# Patient Record
Sex: MALE | Race: WHITE | Employment: OTHER | URBAN - METROPOLITAN AREA
[De-identification: names, ages, dates, MRNs, and addresses within clinical notes are randomized per-mention and may not be internally consistent; named-entity substitution may affect disease eponyms.]

---

## 2017-01-05 ENCOUNTER — HOSPITAL ENCOUNTER (OUTPATIENT)
Dept: NON INVASIVE DIAGNOSTICS | Age: 57
Discharge: HOME | End: 2017-01-05
Payer: MEDICARE

## 2017-01-05 DIAGNOSIS — R06.09 DOE (DYSPNEA ON EXERTION): ICD-10-CM

## 2017-01-05 LAB
LV EF: 60 %
LVEF MODALITY: NORMAL

## 2017-01-05 PROCEDURE — 93017 CV STRESS TEST TRACING ONLY: CPT

## 2017-01-05 PROCEDURE — 93350 STRESS TTE ONLY: CPT

## 2017-01-05 PROCEDURE — 93306 TTE W/DOPPLER COMPLETE: CPT

## 2017-01-05 PROCEDURE — 2580000003 HC RX 258

## 2017-01-05 RX ORDER — DOBUTAMINE HYDROCHLORIDE 100 MG/100ML
2.5 INJECTION INTRAVENOUS CONTINUOUS
Status: DISPENSED | OUTPATIENT
Start: 2017-01-05 | End: 2017-01-05

## 2017-01-06 ENCOUNTER — OFFICE VISIT (OUTPATIENT)
Dept: CARDIOLOGY | Age: 57
End: 2017-01-06

## 2017-01-06 VITALS
HEART RATE: 80 BPM | WEIGHT: 313 LBS | OXYGEN SATURATION: 96 % | DIASTOLIC BLOOD PRESSURE: 86 MMHG | HEIGHT: 72 IN | BODY MASS INDEX: 42.39 KG/M2 | SYSTOLIC BLOOD PRESSURE: 128 MMHG | RESPIRATION RATE: 12 BRPM

## 2017-01-06 DIAGNOSIS — I71.21 ASCENDING AORTIC ANEURYSM: ICD-10-CM

## 2017-01-06 DIAGNOSIS — R06.02 SOB (SHORTNESS OF BREATH): ICD-10-CM

## 2017-01-06 DIAGNOSIS — E78.2 MIXED HYPERLIPIDEMIA: Primary | ICD-10-CM

## 2017-01-06 DIAGNOSIS — E66.01 MORBID OBESITY DUE TO EXCESS CALORIES (HCC): ICD-10-CM

## 2017-01-06 DIAGNOSIS — I45.10 RBBB (RIGHT BUNDLE BRANCH BLOCK): ICD-10-CM

## 2017-01-06 PROBLEM — E66.09 OBESITY DUE TO EXCESS CALORIES: Status: ACTIVE | Noted: 2017-01-06

## 2017-01-06 PROCEDURE — 99204 OFFICE O/P NEW MOD 45 MIN: CPT | Performed by: INTERNAL MEDICINE

## 2017-01-23 ENCOUNTER — HOSPITAL ENCOUNTER (OUTPATIENT)
Dept: CT IMAGING | Age: 57
Discharge: HOME | End: 2017-01-23
Payer: MEDICARE

## 2017-01-23 VITALS — HEART RATE: 67 BPM | RESPIRATION RATE: 18 BRPM | SYSTOLIC BLOOD PRESSURE: 122 MMHG | DIASTOLIC BLOOD PRESSURE: 84 MMHG

## 2017-01-23 DIAGNOSIS — I71.21 ASCENDING AORTIC ANEURYSM: ICD-10-CM

## 2017-01-23 PROCEDURE — 6360000004 HC RX CONTRAST MEDICATION: Performed by: RADIOLOGY

## 2017-01-23 PROCEDURE — 71270 CT THORAX DX C-/C+: CPT

## 2017-01-23 RX ADMIN — IOPAMIDOL 100 ML: 612 INJECTION, SOLUTION INTRAVENOUS at 10:14

## 2017-01-31 ENCOUNTER — OFFICE VISIT (OUTPATIENT)
Dept: CARDIOLOGY | Age: 57
End: 2017-01-31

## 2017-01-31 VITALS
WEIGHT: 297 LBS | SYSTOLIC BLOOD PRESSURE: 130 MMHG | HEART RATE: 70 BPM | BODY MASS INDEX: 40.23 KG/M2 | OXYGEN SATURATION: 98 % | DIASTOLIC BLOOD PRESSURE: 80 MMHG | HEIGHT: 72 IN

## 2017-01-31 DIAGNOSIS — E66.01 MORBID OBESITY DUE TO EXCESS CALORIES (HCC): ICD-10-CM

## 2017-01-31 DIAGNOSIS — I45.10 RBBB (RIGHT BUNDLE BRANCH BLOCK): ICD-10-CM

## 2017-01-31 DIAGNOSIS — I71.21 ASCENDING AORTIC ANEURYSM: ICD-10-CM

## 2017-01-31 DIAGNOSIS — E78.2 MIXED HYPERLIPIDEMIA: Primary | ICD-10-CM

## 2017-01-31 DIAGNOSIS — I35.0 AORTIC STENOSIS, MODERATE: ICD-10-CM

## 2017-01-31 PROCEDURE — 99213 OFFICE O/P EST LOW 20 MIN: CPT | Performed by: INTERNAL MEDICINE

## 2017-01-31 PROCEDURE — G8484 FLU IMMUNIZE NO ADMIN: HCPCS | Performed by: INTERNAL MEDICINE

## 2017-01-31 PROCEDURE — 1036F TOBACCO NON-USER: CPT | Performed by: INTERNAL MEDICINE

## 2017-01-31 PROCEDURE — 3017F COLORECTAL CA SCREEN DOC REV: CPT | Performed by: INTERNAL MEDICINE

## 2017-01-31 PROCEDURE — G8428 CUR MEDS NOT DOCUMENT: HCPCS | Performed by: INTERNAL MEDICINE

## 2017-01-31 PROCEDURE — G8419 CALC BMI OUT NRM PARAM NOF/U: HCPCS | Performed by: INTERNAL MEDICINE

## 2017-02-06 ENCOUNTER — TELEPHONE (OUTPATIENT)
Dept: CARDIOLOGY | Age: 57
End: 2017-02-06

## 2017-02-07 ENCOUNTER — TELEPHONE (OUTPATIENT)
Dept: CARDIOLOGY | Age: 57
End: 2017-02-07

## 2017-02-10 DIAGNOSIS — R06.02 SOB (SHORTNESS OF BREATH): ICD-10-CM

## 2017-02-10 DIAGNOSIS — I45.10 RBBB (RIGHT BUNDLE BRANCH BLOCK): Primary | ICD-10-CM

## 2017-02-10 DIAGNOSIS — I71.21 ASCENDING AORTIC ANEURYSM: ICD-10-CM

## 2017-02-16 ENCOUNTER — HOSPITAL ENCOUNTER (OUTPATIENT)
Dept: CARDIAC CATH/INVASIVE PROCEDURES | Age: 57
Setting detail: OUTPATIENT SURGERY
Discharge: HOME | End: 2017-02-16
Attending: INTERNAL MEDICINE | Admitting: INTERNAL MEDICINE
Payer: MEDICARE

## 2017-02-16 VITALS
RESPIRATION RATE: 14 BRPM | OXYGEN SATURATION: 93 % | TEMPERATURE: 96.8 F | HEART RATE: 67 BPM | HEIGHT: 72 IN | WEIGHT: 297.62 LBS | DIASTOLIC BLOOD PRESSURE: 75 MMHG | SYSTOLIC BLOOD PRESSURE: 113 MMHG | BODY MASS INDEX: 40.31 KG/M2

## 2017-02-16 LAB
ANION GAP SERPL CALCULATED.3IONS-SCNC: 13 MEQ/L (ref 7–13)
BUN BLDV-MCNC: 15 MG/DL (ref 6–20)
CALCIUM SERPL-MCNC: 9.2 MG/DL (ref 8.6–10.2)
CHLORIDE BLD-SCNC: 99 MEQ/L (ref 98–107)
CO2: 25 MEQ/L (ref 22–29)
CREAT SERPL-MCNC: 0.79 MG/DL (ref 0.7–1.2)
GFR AFRICAN AMERICAN: >60
GFR NON-AFRICAN AMERICAN: >60
GLUCOSE BLD-MCNC: 95 MG/DL (ref 74–109)
HCT VFR BLD CALC: 47.9 % (ref 42–52)
HEMOGLOBIN: 16.3 G/DL (ref 14–18)
INR BLD: 1
MCH RBC QN AUTO: 32.1 PG (ref 27–31.3)
MCHC RBC AUTO-ENTMCNC: 34 % (ref 33–37)
MCV RBC AUTO: 94.2 FL (ref 80–100)
PDW BLD-RTO: 12.9 % (ref 11.5–14.5)
PLATELET # BLD: 244 K/UL (ref 130–400)
POTASSIUM SERPL-SCNC: 3.9 MEQ/L (ref 3.5–5.1)
PROTHROMBIN TIME: 11.1 SEC (ref 8.1–13.7)
RBC # BLD: 5.09 M/UL (ref 4.7–6.1)
SODIUM BLD-SCNC: 137 MEQ/L (ref 132–144)
WBC # BLD: 6.2 K/UL (ref 4.8–10.8)

## 2017-02-16 PROCEDURE — 2500000003 HC RX 250 WO HCPCS

## 2017-02-16 PROCEDURE — 80048 BASIC METABOLIC PNL TOTAL CA: CPT

## 2017-02-16 PROCEDURE — 85027 COMPLETE CBC AUTOMATED: CPT

## 2017-02-16 PROCEDURE — 2580000003 HC RX 258: Performed by: INTERNAL MEDICINE

## 2017-02-16 PROCEDURE — 93005 ELECTROCARDIOGRAM TRACING: CPT

## 2017-02-16 PROCEDURE — 6360000002 HC RX W HCPCS

## 2017-02-16 PROCEDURE — 2720000010 HC SURG SUPPLY STERILE: Performed by: NURSE PRACTITIONER

## 2017-02-16 PROCEDURE — 2580000003 HC RX 258

## 2017-02-16 PROCEDURE — 93458 L HRT ARTERY/VENTRICLE ANGIO: CPT | Performed by: INTERNAL MEDICINE

## 2017-02-16 PROCEDURE — C1894 INTRO/SHEATH, NON-LASER: HCPCS | Performed by: NURSE PRACTITIONER

## 2017-02-16 PROCEDURE — C1769 GUIDE WIRE: HCPCS | Performed by: NURSE PRACTITIONER

## 2017-02-16 PROCEDURE — 93454 CORONARY ARTERY ANGIO S&I: CPT | Performed by: INTERNAL MEDICINE

## 2017-02-16 PROCEDURE — C1725 CATH, TRANSLUMIN NON-LASER: HCPCS | Performed by: NURSE PRACTITIONER

## 2017-02-16 PROCEDURE — 85610 PROTHROMBIN TIME: CPT

## 2017-02-16 RX ORDER — SODIUM CHLORIDE 9 MG/ML
INJECTION, SOLUTION INTRAVENOUS CONTINUOUS
Status: ACTIVE | OUTPATIENT
Start: 2017-02-16 | End: 2017-02-16

## 2017-02-16 RX ORDER — ASPIRIN 81 MG/1
81 TABLET, CHEWABLE ORAL ONCE
Status: DISCONTINUED | OUTPATIENT
Start: 2017-02-16 | End: 2017-02-17 | Stop reason: HOSPADM

## 2017-02-16 RX ORDER — ONDANSETRON 2 MG/ML
4 INJECTION INTRAMUSCULAR; INTRAVENOUS EVERY 6 HOURS PRN
Status: DISCONTINUED | OUTPATIENT
Start: 2017-02-16 | End: 2017-02-17 | Stop reason: HOSPADM

## 2017-02-16 RX ORDER — SODIUM CHLORIDE 9 MG/ML
INJECTION, SOLUTION INTRAVENOUS CONTINUOUS
Status: DISCONTINUED | OUTPATIENT
Start: 2017-02-16 | End: 2017-02-17 | Stop reason: HOSPADM

## 2017-02-16 RX ORDER — SODIUM CHLORIDE 0.9 % (FLUSH) 0.9 %
10 SYRINGE (ML) INJECTION EVERY 12 HOURS SCHEDULED
Status: DISCONTINUED | OUTPATIENT
Start: 2017-02-16 | End: 2017-02-17 | Stop reason: HOSPADM

## 2017-02-16 RX ORDER — ACETAMINOPHEN 325 MG/1
650 TABLET ORAL EVERY 4 HOURS PRN
Status: DISCONTINUED | OUTPATIENT
Start: 2017-02-16 | End: 2017-02-17 | Stop reason: HOSPADM

## 2017-02-16 RX ORDER — RANOLAZINE 500 MG/1
500 TABLET, EXTENDED RELEASE ORAL ONCE
Status: DISCONTINUED | OUTPATIENT
Start: 2017-02-16 | End: 2017-02-17 | Stop reason: HOSPADM

## 2017-02-16 RX ORDER — ALPRAZOLAM 0.5 MG/1
0.5 TABLET ORAL
Status: ACTIVE | OUTPATIENT
Start: 2017-02-16 | End: 2017-02-16

## 2017-02-16 RX ADMIN — SODIUM CHLORIDE 1000 ML: 9 INJECTION, SOLUTION INTRAVENOUS at 11:52

## 2017-02-17 LAB
EKG ATRIAL RATE: 78 BPM
EKG P AXIS: 30 DEGREES
EKG P-R INTERVAL: 174 MS
EKG Q-T INTERVAL: 420 MS
EKG QRS DURATION: 126 MS
EKG QTC CALCULATION (BAZETT): 478 MS
EKG R AXIS: 74 DEGREES
EKG T AXIS: 17 DEGREES
EKG VENTRICULAR RATE: 78 BPM

## 2017-04-17 ENCOUNTER — HOSPITAL ENCOUNTER (OUTPATIENT)
Dept: PHARMACY | Age: 57
Discharge: HOME | End: 2017-04-17
Payer: MEDICARE

## 2017-04-17 LAB
INR BLD: 1.2
PROTIME: 14.8 SECONDS

## 2017-04-17 PROCEDURE — 85610 PROTHROMBIN TIME: CPT

## 2017-04-17 PROCEDURE — G0463 HOSPITAL OUTPT CLINIC VISIT: HCPCS

## 2017-04-17 RX ORDER — FUROSEMIDE 20 MG/1
20 TABLET ORAL DAILY
COMMUNITY
End: 2017-05-10

## 2017-04-17 RX ORDER — OXYCODONE HYDROCHLORIDE AND ACETAMINOPHEN 5; 325 MG/1; MG/1
1 TABLET ORAL EVERY 6 HOURS PRN
COMMUNITY
End: 2017-05-10

## 2017-04-17 RX ORDER — WARFARIN SODIUM 2 MG/1
2 TABLET ORAL CONTINUOUS
COMMUNITY
End: 2017-05-10

## 2017-04-17 RX ORDER — DOCUSATE SODIUM 100 MG/1
100 CAPSULE, LIQUID FILLED ORAL 2 TIMES DAILY
COMMUNITY
End: 2017-05-10

## 2017-04-17 RX ORDER — ASPIRIN 81 MG/1
81 TABLET, CHEWABLE ORAL DAILY
COMMUNITY

## 2017-04-17 RX ORDER — M-VIT,TX,IRON,MINS/CALC/FOLIC 27MG-0.4MG
1 TABLET ORAL DAILY
COMMUNITY
End: 2017-05-10

## 2017-04-25 ENCOUNTER — HOSPITAL ENCOUNTER (OUTPATIENT)
Dept: PHARMACY | Age: 57
Discharge: HOME | End: 2017-04-25
Payer: MEDICARE

## 2017-04-25 DIAGNOSIS — Z95.2 HEART VALVE REPLACED: ICD-10-CM

## 2017-04-25 LAB
INR BLD: 1.2
PROTIME: 14.1 SECONDS

## 2017-04-25 PROCEDURE — G0463 HOSPITAL OUTPT CLINIC VISIT: HCPCS | Performed by: PHARMACIST

## 2017-04-25 PROCEDURE — 85610 PROTHROMBIN TIME: CPT | Performed by: PHARMACIST

## 2017-05-03 ENCOUNTER — HOSPITAL ENCOUNTER (OUTPATIENT)
Dept: PHARMACY | Age: 57
Discharge: HOME | End: 2017-05-03
Payer: MEDICARE

## 2017-05-03 DIAGNOSIS — Z95.2 HEART VALVE REPLACED: ICD-10-CM

## 2017-05-03 LAB
INR BLD: 1.2
PROTIME: 14.1 SECONDS

## 2017-05-03 PROCEDURE — 85610 PROTHROMBIN TIME: CPT | Performed by: PHARMACIST

## 2017-05-03 PROCEDURE — 99211 OFF/OP EST MAY X REQ PHY/QHP: CPT | Performed by: PHARMACIST

## 2017-05-03 RX ORDER — WARFARIN SODIUM 5 MG/1
10 TABLET ORAL DAILY
COMMUNITY
End: 2017-05-03 | Stop reason: SDUPTHER

## 2017-05-03 RX ORDER — WARFARIN SODIUM 5 MG/1
TABLET ORAL
Qty: 200 TABLET | Refills: 1 | Status: SHIPPED | OUTPATIENT
Start: 2017-05-03 | End: 2017-05-03 | Stop reason: SDUPTHER

## 2017-05-03 RX ORDER — WARFARIN SODIUM 5 MG/1
TABLET ORAL
Qty: 200 TABLET | Refills: 1 | OUTPATIENT
Start: 2017-05-03 | End: 2017-06-23 | Stop reason: SDUPTHER

## 2017-05-10 ENCOUNTER — HOSPITAL ENCOUNTER (OUTPATIENT)
Dept: PHARMACY | Age: 57
Discharge: HOME | End: 2017-05-10
Payer: MEDICARE

## 2017-05-10 ENCOUNTER — TELEPHONE (OUTPATIENT)
Dept: CARDIOLOGY | Age: 57
End: 2017-05-10

## 2017-05-10 ENCOUNTER — OFFICE VISIT (OUTPATIENT)
Dept: PRIMARY CARE CLINIC | Age: 57
End: 2017-05-10

## 2017-05-10 VITALS
SYSTOLIC BLOOD PRESSURE: 126 MMHG | DIASTOLIC BLOOD PRESSURE: 82 MMHG | HEART RATE: 81 BPM | OXYGEN SATURATION: 95 % | BODY MASS INDEX: 38.43 KG/M2 | HEIGHT: 73 IN | TEMPERATURE: 97.1 F | WEIGHT: 290 LBS

## 2017-05-10 DIAGNOSIS — Z95.2 S/P AVR: ICD-10-CM

## 2017-05-10 DIAGNOSIS — Z12.5 PROSTATE CANCER SCREENING: ICD-10-CM

## 2017-05-10 DIAGNOSIS — E55.9 VITAMIN D DEFICIENCY: ICD-10-CM

## 2017-05-10 DIAGNOSIS — E78.2 MIXED HYPERLIPIDEMIA: ICD-10-CM

## 2017-05-10 DIAGNOSIS — I35.0 AORTIC STENOSIS, MODERATE: Primary | ICD-10-CM

## 2017-05-10 DIAGNOSIS — Z95.2 HEART VALVE REPLACED: ICD-10-CM

## 2017-05-10 LAB
INR BLD: 1.2
PROTIME: 14.8 SECONDS

## 2017-05-10 PROCEDURE — 99213 OFFICE O/P EST LOW 20 MIN: CPT | Performed by: FAMILY MEDICINE

## 2017-05-10 PROCEDURE — G8427 DOCREV CUR MEDS BY ELIG CLIN: HCPCS | Performed by: FAMILY MEDICINE

## 2017-05-10 PROCEDURE — 3017F COLORECTAL CA SCREEN DOC REV: CPT | Performed by: FAMILY MEDICINE

## 2017-05-10 PROCEDURE — 85610 PROTHROMBIN TIME: CPT

## 2017-05-10 PROCEDURE — 1036F TOBACCO NON-USER: CPT | Performed by: FAMILY MEDICINE

## 2017-05-10 PROCEDURE — G8417 CALC BMI ABV UP PARAM F/U: HCPCS | Performed by: FAMILY MEDICINE

## 2017-05-10 PROCEDURE — 99211 OFF/OP EST MAY X REQ PHY/QHP: CPT

## 2017-05-10 RX ORDER — ATORVASTATIN CALCIUM 40 MG/1
40 TABLET, FILM COATED ORAL DAILY
COMMUNITY
Start: 2017-05-03 | End: 2017-09-12

## 2017-05-10 ASSESSMENT — ENCOUNTER SYMPTOMS
SORE THROAT: 0
COUGH: 0
BACK PAIN: 0
ABDOMINAL PAIN: 0
WHEEZING: 0
DIARRHEA: 0
PHOTOPHOBIA: 0
SHORTNESS OF BREATH: 0
CONSTIPATION: 0
EYE REDNESS: 0
NAUSEA: 0
EYE PAIN: 0
VOMITING: 0

## 2017-05-11 ENCOUNTER — TELEPHONE (OUTPATIENT)
Dept: PHARMACY | Age: 57
End: 2017-05-11

## 2017-05-12 DIAGNOSIS — E78.2 MIXED HYPERLIPIDEMIA: ICD-10-CM

## 2017-05-12 DIAGNOSIS — Z12.5 PROSTATE CANCER SCREENING: ICD-10-CM

## 2017-05-12 DIAGNOSIS — E55.9 VITAMIN D DEFICIENCY: ICD-10-CM

## 2017-05-12 LAB
ALBUMIN SERPL-MCNC: 4.2 G/DL (ref 3.9–4.9)
ALP BLD-CCNC: 121 U/L (ref 35–104)
ALT SERPL-CCNC: 22 U/L (ref 0–41)
ANION GAP SERPL CALCULATED.3IONS-SCNC: 10 MEQ/L (ref 7–13)
AST SERPL-CCNC: 16 U/L (ref 0–40)
BILIRUB SERPL-MCNC: 0.2 MG/DL (ref 0–1.2)
BUN BLDV-MCNC: 14 MG/DL (ref 6–20)
CALCIUM SERPL-MCNC: 8.8 MG/DL (ref 8.6–10.2)
CHLORIDE BLD-SCNC: 104 MEQ/L (ref 98–107)
CHOLESTEROL, TOTAL: 153 MG/DL (ref 0–199)
CO2: 24 MEQ/L (ref 22–29)
CREAT SERPL-MCNC: 0.84 MG/DL (ref 0.7–1.2)
GFR AFRICAN AMERICAN: >60
GFR NON-AFRICAN AMERICAN: >60
GLOBULIN: 2.3 G/DL (ref 2.3–3.5)
GLUCOSE BLD-MCNC: 104 MG/DL (ref 74–109)
HDLC SERPL-MCNC: 40 MG/DL (ref 40–59)
LDL CHOLESTEROL CALCULATED: 87 MG/DL (ref 0–129)
POTASSIUM SERPL-SCNC: 4.2 MEQ/L (ref 3.5–5.1)
PROSTATE SPECIFIC ANTIGEN: 1.24 NG/ML (ref 0–3.89)
SODIUM BLD-SCNC: 138 MEQ/L (ref 132–144)
TOTAL PROTEIN: 6.5 G/DL (ref 6.4–8.1)
TRIGL SERPL-MCNC: 132 MG/DL (ref 0–200)
VITAMIN D 25-HYDROXY: 28 NG/ML (ref 30–100)

## 2017-05-19 ENCOUNTER — HOSPITAL ENCOUNTER (OUTPATIENT)
Dept: PHARMACY | Age: 57
Discharge: HOME | End: 2017-05-19
Payer: MEDICARE

## 2017-05-19 DIAGNOSIS — Z95.2 HEART VALVE REPLACED: ICD-10-CM

## 2017-05-19 LAB
INR BLD: 1.5
PROTIME: 17.5 SECONDS

## 2017-05-19 PROCEDURE — 85610 PROTHROMBIN TIME: CPT | Performed by: PHARMACIST

## 2017-05-19 PROCEDURE — 99211 OFF/OP EST MAY X REQ PHY/QHP: CPT | Performed by: PHARMACIST

## 2017-05-25 ENCOUNTER — TELEPHONE (OUTPATIENT)
Dept: PRIMARY CARE CLINIC | Age: 57
End: 2017-05-25

## 2017-06-02 ENCOUNTER — HOSPITAL ENCOUNTER (OUTPATIENT)
Dept: PHARMACY | Age: 57
Discharge: HOME | End: 2017-06-02
Payer: MEDICARE

## 2017-06-02 DIAGNOSIS — Z95.2 HEART VALVE REPLACED: ICD-10-CM

## 2017-06-02 LAB
INR BLD: 1.4
PROTIME: 17 SECONDS

## 2017-06-02 PROCEDURE — 85610 PROTHROMBIN TIME: CPT

## 2017-06-02 PROCEDURE — 99211 OFF/OP EST MAY X REQ PHY/QHP: CPT

## 2017-06-09 ENCOUNTER — HOSPITAL ENCOUNTER (OUTPATIENT)
Dept: PHARMACY | Age: 57
Discharge: HOME | End: 2017-06-09
Payer: MEDICARE

## 2017-06-09 DIAGNOSIS — Z95.2 HEART VALVE REPLACED: ICD-10-CM

## 2017-06-09 LAB
INR BLD: 2.1
PROTIME: 25 SECONDS

## 2017-06-09 PROCEDURE — 85610 PROTHROMBIN TIME: CPT

## 2017-06-09 PROCEDURE — 99211 OFF/OP EST MAY X REQ PHY/QHP: CPT

## 2017-06-10 RX ORDER — ERGOCALCIFEROL 1.25 MG/1
50000 CAPSULE ORAL WEEKLY
Qty: 12 CAPSULE | Refills: 0 | Status: SHIPPED | OUTPATIENT
Start: 2017-06-10 | End: 2017-09-12

## 2017-06-23 ENCOUNTER — HOSPITAL ENCOUNTER (OUTPATIENT)
Dept: PHARMACY | Age: 57
Setting detail: THERAPIES SERIES
Discharge: HOME | End: 2017-06-23
Payer: MEDICARE

## 2017-06-23 DIAGNOSIS — Z95.2 HEART VALVE REPLACED: ICD-10-CM

## 2017-06-23 LAB
INR BLD: 2.2
PROTIME: 26.2 SECONDS

## 2017-06-23 PROCEDURE — 99211 OFF/OP EST MAY X REQ PHY/QHP: CPT

## 2017-06-23 PROCEDURE — 85610 PROTHROMBIN TIME: CPT

## 2017-06-23 RX ORDER — WARFARIN SODIUM 5 MG/1
TABLET ORAL
Qty: 275 TABLET | Refills: 1 | Status: SHIPPED | OUTPATIENT
Start: 2017-06-23 | End: 2017-10-10

## 2017-07-14 ENCOUNTER — HOSPITAL ENCOUNTER (OUTPATIENT)
Dept: PHARMACY | Age: 57
Setting detail: THERAPIES SERIES
Discharge: HOME | End: 2017-07-14
Payer: MEDICARE

## 2017-07-14 DIAGNOSIS — Z95.2 HEART VALVE REPLACED: ICD-10-CM

## 2017-07-14 LAB
INR BLD: 2.1
PROTIME: 25.7 SECONDS

## 2017-07-14 PROCEDURE — 99211 OFF/OP EST MAY X REQ PHY/QHP: CPT

## 2017-07-14 PROCEDURE — 85610 PROTHROMBIN TIME: CPT

## 2017-08-14 ENCOUNTER — ANTI-COAG VISIT (OUTPATIENT)
Dept: PHARMACY | Age: 57
End: 2017-08-14

## 2017-08-14 DIAGNOSIS — Z95.2 HEART VALVE REPLACED: ICD-10-CM

## 2017-08-15 ENCOUNTER — OFFICE VISIT (OUTPATIENT)
Dept: PRIMARY CARE CLINIC | Age: 57
End: 2017-08-15

## 2017-08-15 VITALS
SYSTOLIC BLOOD PRESSURE: 102 MMHG | WEIGHT: 298 LBS | BODY MASS INDEX: 40.36 KG/M2 | TEMPERATURE: 98.1 F | HEIGHT: 72 IN | DIASTOLIC BLOOD PRESSURE: 70 MMHG | HEART RATE: 75 BPM | OXYGEN SATURATION: 95 % | RESPIRATION RATE: 16 BRPM

## 2017-08-15 DIAGNOSIS — F31.9 BIPOLAR 1 DISORDER (HCC): ICD-10-CM

## 2017-08-15 DIAGNOSIS — I35.0 AORTIC STENOSIS, MODERATE: ICD-10-CM

## 2017-08-15 DIAGNOSIS — E78.5 DYSLIPIDEMIA: Primary | ICD-10-CM

## 2017-08-15 DIAGNOSIS — E78.5 DYSLIPIDEMIA: ICD-10-CM

## 2017-08-15 LAB
ALBUMIN SERPL-MCNC: 4.2 G/DL (ref 3.9–4.9)
ALP BLD-CCNC: 144 U/L (ref 35–104)
ALT SERPL-CCNC: 22 U/L (ref 0–41)
ANION GAP SERPL CALCULATED.3IONS-SCNC: 16 MEQ/L (ref 7–13)
AST SERPL-CCNC: 21 U/L (ref 0–40)
BILIRUB SERPL-MCNC: 0.2 MG/DL (ref 0–1.2)
BUN BLDV-MCNC: 21 MG/DL (ref 6–20)
CALCIUM SERPL-MCNC: 8.8 MG/DL (ref 8.6–10.2)
CHLORIDE BLD-SCNC: 104 MEQ/L (ref 98–107)
CHOLESTEROL, TOTAL: 216 MG/DL (ref 0–199)
CO2: 23 MEQ/L (ref 22–29)
CREAT SERPL-MCNC: 0.83 MG/DL (ref 0.7–1.2)
GFR AFRICAN AMERICAN: >60
GFR NON-AFRICAN AMERICAN: >60
GLOBULIN: 2.7 G/DL (ref 2.3–3.5)
GLUCOSE BLD-MCNC: 94 MG/DL (ref 74–109)
HDLC SERPL-MCNC: 32 MG/DL (ref 40–59)
LDL CHOLESTEROL CALCULATED: ABNORMAL MG/DL (ref 0–129)
POTASSIUM SERPL-SCNC: 4.9 MEQ/L (ref 3.5–5.1)
SODIUM BLD-SCNC: 143 MEQ/L (ref 132–144)
TOTAL PROTEIN: 6.9 G/DL (ref 6.4–8.1)
TRIGL SERPL-MCNC: 436 MG/DL (ref 0–200)

## 2017-08-15 PROCEDURE — G8417 CALC BMI ABV UP PARAM F/U: HCPCS | Performed by: FAMILY MEDICINE

## 2017-08-15 PROCEDURE — 3017F COLORECTAL CA SCREEN DOC REV: CPT | Performed by: FAMILY MEDICINE

## 2017-08-15 PROCEDURE — G8427 DOCREV CUR MEDS BY ELIG CLIN: HCPCS | Performed by: FAMILY MEDICINE

## 2017-08-15 PROCEDURE — 1036F TOBACCO NON-USER: CPT | Performed by: FAMILY MEDICINE

## 2017-08-15 PROCEDURE — 99213 OFFICE O/P EST LOW 20 MIN: CPT | Performed by: FAMILY MEDICINE

## 2017-08-15 RX ORDER — CARBAMAZEPINE 300 MG/1
CAPSULE, EXTENDED RELEASE ORAL
Refills: 1 | COMMUNITY
Start: 2017-06-09 | End: 2017-08-15 | Stop reason: SDUPTHER

## 2017-08-15 ASSESSMENT — ENCOUNTER SYMPTOMS
BLOOD IN STOOL: 0
CONSTIPATION: 0
EYE DISCHARGE: 0
SHORTNESS OF BREATH: 0
VOMITING: 0
RESPIRATORY NEGATIVE: 1
EYES NEGATIVE: 1
GASTROINTESTINAL NEGATIVE: 1
DIARRHEA: 0
BACK PAIN: 0
CHEST TIGHTNESS: 0
PHOTOPHOBIA: 0
ABDOMINAL PAIN: 0
COUGH: 0
APNEA: 0
NAUSEA: 0

## 2017-08-15 ASSESSMENT — PATIENT HEALTH QUESTIONNAIRE - PHQ9
SUM OF ALL RESPONSES TO PHQ9 QUESTIONS 1 & 2: 0
2. FEELING DOWN, DEPRESSED OR HOPELESS: 0
1. LITTLE INTEREST OR PLEASURE IN DOING THINGS: 0
SUM OF ALL RESPONSES TO PHQ QUESTIONS 1-9: 0

## 2017-08-21 ENCOUNTER — ANTI-COAG VISIT (OUTPATIENT)
Dept: PHARMACY | Age: 57
End: 2017-08-21

## 2017-08-21 DIAGNOSIS — Z95.2 HEART VALVE REPLACED: ICD-10-CM

## 2017-08-29 ENCOUNTER — ANTI-COAG VISIT (OUTPATIENT)
Dept: PHARMACY | Age: 57
End: 2017-08-29

## 2017-08-29 DIAGNOSIS — Z95.2 HEART VALVE REPLACED: ICD-10-CM

## 2017-09-12 ENCOUNTER — OFFICE VISIT (OUTPATIENT)
Dept: PRIMARY CARE CLINIC | Age: 57
End: 2017-09-12

## 2017-09-12 ENCOUNTER — TELEPHONE (OUTPATIENT)
Dept: PHARMACY | Age: 57
End: 2017-09-12

## 2017-09-12 VITALS
HEIGHT: 72 IN | BODY MASS INDEX: 42.53 KG/M2 | WEIGHT: 314 LBS | HEART RATE: 62 BPM | DIASTOLIC BLOOD PRESSURE: 70 MMHG | SYSTOLIC BLOOD PRESSURE: 124 MMHG | RESPIRATION RATE: 18 BRPM | TEMPERATURE: 96.8 F

## 2017-09-12 DIAGNOSIS — I71.21 ASCENDING AORTIC ANEURYSM: ICD-10-CM

## 2017-09-12 DIAGNOSIS — E55.9 VITAMIN D DEFICIENCY: ICD-10-CM

## 2017-09-12 DIAGNOSIS — I35.0 AORTIC STENOSIS, MODERATE: ICD-10-CM

## 2017-09-12 DIAGNOSIS — F31.9 BIPOLAR 1 DISORDER (HCC): Primary | ICD-10-CM

## 2017-09-12 DIAGNOSIS — I71.21 ASCENDING AORTIC ANEURYSM (HCC): ICD-10-CM

## 2017-09-12 LAB
INR BLD: 1.2
PROTHROMBIN TIME: 13.1 SEC (ref 8.1–13.7)

## 2017-09-12 PROCEDURE — 99214 OFFICE O/P EST MOD 30 MIN: CPT | Performed by: FAMILY MEDICINE

## 2017-09-12 PROCEDURE — 3017F COLORECTAL CA SCREEN DOC REV: CPT | Performed by: FAMILY MEDICINE

## 2017-09-12 PROCEDURE — G8417 CALC BMI ABV UP PARAM F/U: HCPCS | Performed by: FAMILY MEDICINE

## 2017-09-12 PROCEDURE — 1036F TOBACCO NON-USER: CPT | Performed by: FAMILY MEDICINE

## 2017-09-12 PROCEDURE — G8427 DOCREV CUR MEDS BY ELIG CLIN: HCPCS | Performed by: FAMILY MEDICINE

## 2017-09-12 RX ORDER — MULTIVIT-MIN/IRON/FOLIC ACID/K 18-600-40
2000 CAPSULE ORAL DAILY
COMMUNITY
End: 2018-05-08

## 2017-09-12 ASSESSMENT — ENCOUNTER SYMPTOMS
DIARRHEA: 0
NAUSEA: 0
CONSTIPATION: 0
SWOLLEN GLANDS: 0
SHORTNESS OF BREATH: 0
WHEEZING: 0
ABDOMINAL PAIN: 0
VOMITING: 0
BACK PAIN: 0
SORE THROAT: 0
COUGH: 0

## 2017-09-13 ENCOUNTER — ANTI-COAG VISIT (OUTPATIENT)
Dept: PRIMARY CARE CLINIC | Age: 57
End: 2017-09-13

## 2017-09-13 DIAGNOSIS — Z95.2 HEART VALVE REPLACED: ICD-10-CM

## 2017-09-20 ENCOUNTER — ANTI-COAG VISIT (OUTPATIENT)
Dept: PHARMACY | Age: 57
End: 2017-09-20

## 2017-09-20 DIAGNOSIS — Z95.2 HEART VALVE REPLACED: ICD-10-CM

## 2017-10-10 ENCOUNTER — OFFICE VISIT (OUTPATIENT)
Dept: PRIMARY CARE CLINIC | Age: 57
End: 2017-10-10

## 2017-10-10 VITALS
HEART RATE: 74 BPM | HEIGHT: 73 IN | TEMPERATURE: 97.3 F | SYSTOLIC BLOOD PRESSURE: 132 MMHG | DIASTOLIC BLOOD PRESSURE: 84 MMHG | RESPIRATION RATE: 16 BRPM | WEIGHT: 305 LBS | BODY MASS INDEX: 40.42 KG/M2

## 2017-10-10 DIAGNOSIS — E78.2 MIXED HYPERLIPIDEMIA: Primary | ICD-10-CM

## 2017-10-10 DIAGNOSIS — F41.9 ANXIETY: ICD-10-CM

## 2017-10-10 DIAGNOSIS — I35.0 AORTIC STENOSIS, MODERATE: ICD-10-CM

## 2017-10-10 DIAGNOSIS — F31.9 BIPOLAR 1 DISORDER (HCC): ICD-10-CM

## 2017-10-10 PROCEDURE — G8417 CALC BMI ABV UP PARAM F/U: HCPCS | Performed by: FAMILY MEDICINE

## 2017-10-10 PROCEDURE — 99214 OFFICE O/P EST MOD 30 MIN: CPT | Performed by: FAMILY MEDICINE

## 2017-10-10 PROCEDURE — G8427 DOCREV CUR MEDS BY ELIG CLIN: HCPCS | Performed by: FAMILY MEDICINE

## 2017-10-10 PROCEDURE — 3017F COLORECTAL CA SCREEN DOC REV: CPT | Performed by: FAMILY MEDICINE

## 2017-10-10 PROCEDURE — G8484 FLU IMMUNIZE NO ADMIN: HCPCS | Performed by: FAMILY MEDICINE

## 2017-10-10 PROCEDURE — 1036F TOBACCO NON-USER: CPT | Performed by: FAMILY MEDICINE

## 2017-10-10 RX ORDER — FENOFIBRATE 160 MG/1
160 TABLET ORAL DAILY
Qty: 30 TABLET | Refills: 3 | Status: SHIPPED | OUTPATIENT
Start: 2017-10-10 | End: 2018-02-06 | Stop reason: SDUPTHER

## 2017-10-10 ASSESSMENT — ENCOUNTER SYMPTOMS
DIARRHEA: 0
ABDOMINAL PAIN: 0
SORE THROAT: 0
COUGH: 1
CONSTIPATION: 0
WHEEZING: 0
NAUSEA: 0
BACK PAIN: 0
VOMITING: 0
SHORTNESS OF BREATH: 0

## 2017-10-10 NOTE — PROGRESS NOTES
Social History     Social History    Marital status:      Spouse name: N/A    Number of children: N/A    Years of education: N/A     Occupational History    Not on file. Social History Main Topics    Smoking status: Never Smoker    Smokeless tobacco: Never Used    Alcohol use No    Drug use: No    Sexual activity: Not on file     Other Topics Concern    Not on file     Social History Narrative    No narrative on file     Allergies:  Review of patient's allergies indicates no known allergies. Review of Systems   Constitutional: Negative for chills, fatigue and fever. HENT: Negative for congestion, ear pain, sneezing and sore throat. Respiratory: Positive for cough. Negative for shortness of breath and wheezing. Cardiovascular: Negative for chest pain, palpitations and leg swelling. Gastrointestinal: Negative for abdominal pain, constipation, diarrhea, nausea and vomiting. Musculoskeletal: Negative for back pain and neck pain. Neurological: Negative for dizziness, weakness, numbness and headaches. Psychiatric/Behavioral: Positive for agitation, decreased concentration and dysphoric mood. The patient is nervous/anxious. Altered mental status         Objective:   /84 (Site: Right Arm, Position: Sitting, Cuff Size: Large Adult)   Pulse 74   Temp 97.3 °F (36.3 °C) (Tympanic)   Resp 16   Ht 6' 1\" (1.854 m)   Wt (!) 305 lb (138.3 kg)   BMI 40.24 kg/m²     Physical Exam   Constitutional: He is oriented to person, place, and time. He appears well-developed and well-nourished. HENT:   Head: Normocephalic and atraumatic. Eyes: Conjunctivae and EOM are normal. Pupils are equal, round, and reactive to light. Neck: Normal range of motion. Neck supple. No JVD present. No thyromegaly present. Cardiovascular: Normal rate, regular rhythm, normal heart sounds and intact distal pulses. Exam reveals no gallop. No murmur heard.   Pulmonary/Chest: Effort normal and breath sounds normal. No respiratory distress. He has no wheezes. Abdominal: Soft. Bowel sounds are normal. He exhibits no distension. There is no tenderness. Musculoskeletal: Normal range of motion. He exhibits no edema. Lymphadenopathy:     He has no cervical adenopathy. Neurological: He is alert and oriented to person, place, and time. He has normal reflexes. No cranial nerve deficit. Skin: Skin is warm and dry. No rash noted. Psychiatric: Thought content normal. His mood appears anxious. His affect is labile. He is agitated. He exhibits a depressed mood. Nursing note and vitals reviewed. Assessment:     1. Mixed hyperlipidemia  fenofibrate 160 MG tablet    Comprehensive Metabolic Panel    Lipid Panel   2. Aortic stenosis, moderate     3. Anxiety, Dr Yeh Outhouse:      Orders Placed This Encounter   Procedures    Comprehensive Metabolic Panel     Standing Status:   Future     Standing Expiration Date:   1/10/2018    Lipid Panel     Standing Status:   Future     Standing Expiration Date:   1/10/2018     Order Specific Question:   Is Patient Fasting?/# of Hours     Answer:   yes     Orders Placed This Encounter   Medications    fenofibrate 160 MG tablet     Sig: Take 1 tablet by mouth daily     Dispense:  30 tablet     Refill:  3       Controlled Substances Monitoring:      Return in about 3 months (around 1/10/2018) for Review of Labs & Diagnostic Testing, Routine follow up. I, Pasha Gusman ([address] [place] [address])  , am scribing for and in the presence of Jagjit Finn DO. Electronically signed by :  Pasha Gusman ([address] [place] [address])      Susanne Sexton DO, personally performed the services described in this documentation, as scribed by Cari Jay in my presence, and it is both accurate and complete.  Electronically signed by: Jagjit Finn DO    10/14/17 10:22 PM    Jagjit Finn DO

## 2017-11-30 DIAGNOSIS — F31.9 BIPOLAR 1 DISORDER (HCC): Primary | ICD-10-CM

## 2017-12-07 ENCOUNTER — TELEPHONE (OUTPATIENT)
Dept: PRIMARY CARE CLINIC | Age: 57
End: 2017-12-07

## 2017-12-14 ENCOUNTER — TELEPHONE (OUTPATIENT)
Dept: PRIMARY CARE CLINIC | Age: 57
End: 2017-12-14

## 2017-12-14 ENCOUNTER — OFFICE VISIT (OUTPATIENT)
Dept: PRIMARY CARE CLINIC | Age: 57
End: 2017-12-14

## 2017-12-14 VITALS
WEIGHT: 315 LBS | OXYGEN SATURATION: 95 % | RESPIRATION RATE: 16 BRPM | TEMPERATURE: 95 F | HEIGHT: 73 IN | BODY MASS INDEX: 41.75 KG/M2 | SYSTOLIC BLOOD PRESSURE: 122 MMHG | DIASTOLIC BLOOD PRESSURE: 68 MMHG | HEART RATE: 75 BPM

## 2017-12-14 DIAGNOSIS — E34.9 HYPOTESTOSTERONISM: Primary | ICD-10-CM

## 2017-12-14 DIAGNOSIS — N52.9 ERECTILE DYSFUNCTION, UNSPECIFIED ERECTILE DYSFUNCTION TYPE: ICD-10-CM

## 2017-12-14 PROCEDURE — 3017F COLORECTAL CA SCREEN DOC REV: CPT | Performed by: FAMILY MEDICINE

## 2017-12-14 PROCEDURE — G8484 FLU IMMUNIZE NO ADMIN: HCPCS | Performed by: FAMILY MEDICINE

## 2017-12-14 PROCEDURE — 1036F TOBACCO NON-USER: CPT | Performed by: FAMILY MEDICINE

## 2017-12-14 PROCEDURE — G8427 DOCREV CUR MEDS BY ELIG CLIN: HCPCS | Performed by: FAMILY MEDICINE

## 2017-12-14 PROCEDURE — G8417 CALC BMI ABV UP PARAM F/U: HCPCS | Performed by: FAMILY MEDICINE

## 2017-12-14 PROCEDURE — 99213 OFFICE O/P EST LOW 20 MIN: CPT | Performed by: FAMILY MEDICINE

## 2017-12-14 RX ORDER — TESTOSTERONE 16.2 MG/G
1 GEL TRANSDERMAL DAILY
Qty: 75 G | Refills: 2 | Status: SHIPPED | OUTPATIENT
Start: 2017-12-14 | End: 2018-02-17

## 2017-12-14 RX ORDER — PHENTERMINE HYDROCHLORIDE 37.5 MG/1
37.5 TABLET ORAL
Qty: 30 TABLET | Refills: 0 | Status: SHIPPED | OUTPATIENT
Start: 2017-12-14 | End: 2018-01-13

## 2017-12-14 ASSESSMENT — ENCOUNTER SYMPTOMS
ABDOMINAL PAIN: 0
CONSTIPATION: 0
BLOOD IN STOOL: 0
DIARRHEA: 0
CHEST TIGHTNESS: 0
APNEA: 0
PHOTOPHOBIA: 0
CHANGE IN BOWEL HABIT: 0
VISUAL CHANGE: 0
EYES NEGATIVE: 1
NAUSEA: 0
EYE DISCHARGE: 0
SWOLLEN GLANDS: 0
SORE THROAT: 0
BACK PAIN: 0
GASTROINTESTINAL NEGATIVE: 1
RESPIRATORY NEGATIVE: 1
SHORTNESS OF BREATH: 0
COUGH: 0
VOMITING: 0

## 2017-12-14 NOTE — TELEPHONE ENCOUNTER
Prior Authorization: Approved      Prior authorization approved Case ID: Arian Neal Das - Medicare    [id number]        Approval Details     Authorized from December 29, 2016 to December 30, 2017      Electronic appeal:  Not supported   View History   Testosterone (ANDROGEL) 20.25 MG/ACT (1.62%) GEL gel  Place 1 actuation onto the skin daily On each shoulder. Dispense:  75 g   Refills:  2  Start:  12/14/2017     Class:  Print  This order has been released to its destination.   To be filled at: [address] [place] [address] Street: [phone]

## 2017-12-14 NOTE — PROGRESS NOTES
status:      Spouse name: N/A    Number of children: N/A    Years of education: N/A     Occupational History    Not on file. Social History Main Topics    Smoking status: Never Smoker    Smokeless tobacco: Never Used    Alcohol use No    Drug use: No    Sexual activity: Not on file     Other Topics Concern    Not on file     Social History Narrative    No narrative on file     Allergies:  Review of patient's allergies indicates no known allergies. Review of Systems   Constitutional: Positive for fatigue. Negative for activity change, appetite change, chills, diaphoresis and fever. HENT: Negative. Negative for congestion, nosebleeds, sore throat and tinnitus. Eyes: Negative. Negative for photophobia, discharge and visual disturbance. Respiratory: Negative. Negative for apnea, cough, chest tightness and shortness of breath. Cardiovascular: Negative. Negative for chest pain and palpitations. Gastrointestinal: Negative. Negative for abdominal pain, anorexia, blood in stool, change in bowel habit, constipation, diarrhea, nausea and vomiting. Genitourinary: Negative. Negative for dysuria, frequency, hematuria and urgency. Musculoskeletal: Negative. Negative for arthralgias, back pain, joint swelling, myalgias and neck pain. Skin: Negative. Negative for pallor and rash. Neurological: Negative. Negative for dizziness, vertigo, syncope, speech difficulty, weakness, light-headedness, numbness and headaches. Psychiatric/Behavioral: Positive for decreased concentration and dysphoric mood. The patient is nervous/anxious. Objective:   /68 (Site: Right Arm, Position: Sitting, Cuff Size: Large Adult)   Pulse 75   Temp 95 °F (35 °C) (Tympanic)   Resp 16   Ht 6' 1\" (1.854 m)   Wt (!) 322 lb (146.1 kg)   SpO2 95%   BMI 42.48 kg/m²     Physical Exam   Constitutional: He is oriented to person, place, and time. He appears well-developed and well-nourished.    HENT:

## 2017-12-15 ENCOUNTER — NURSE ONLY (OUTPATIENT)
Dept: PRIMARY CARE CLINIC | Age: 57
End: 2017-12-15

## 2017-12-15 DIAGNOSIS — R79.89 LOW TESTOSTERONE IN MALE: Primary | ICD-10-CM

## 2017-12-15 DIAGNOSIS — E34.9 HYPOTESTOSTERONISM: ICD-10-CM

## 2017-12-15 PROCEDURE — 96372 THER/PROPH/DIAG INJ SC/IM: CPT | Performed by: FAMILY MEDICINE

## 2017-12-15 RX ORDER — TESTOSTERONE CYPIONATE 200 MG/ML
200 INJECTION INTRAMUSCULAR ONCE
Status: COMPLETED | OUTPATIENT
Start: 2017-12-15 | End: 2017-12-15

## 2017-12-15 RX ADMIN — TESTOSTERONE CYPIONATE 200 MG: 200 INJECTION INTRAMUSCULAR at 10:44

## 2017-12-15 NOTE — PATIENT INSTRUCTIONS
Pt here for Testosterone Injection   Doing fine   Testosterone Cypionate 200mg/ml   Lot# [id number] Exp 7/19   1cc Im - Right Gluteal     Every 3 weeks

## 2017-12-18 NOTE — TELEPHONE ENCOUNTER
I believe this has been resolved with our testosterone clinic. Please let me know if there are still further questions.  Thank you

## 2018-01-05 ENCOUNTER — OFFICE VISIT (OUTPATIENT)
Dept: PRIMARY CARE CLINIC | Age: 58
End: 2018-01-05

## 2018-01-05 ENCOUNTER — NURSE ONLY (OUTPATIENT)
Dept: PRIMARY CARE CLINIC | Age: 58
End: 2018-01-05

## 2018-01-05 VITALS
HEIGHT: 73 IN | OXYGEN SATURATION: 97 % | WEIGHT: 315 LBS | TEMPERATURE: 97.8 F | BODY MASS INDEX: 41.75 KG/M2 | HEART RATE: 68 BPM | DIASTOLIC BLOOD PRESSURE: 78 MMHG | RESPIRATION RATE: 16 BRPM | SYSTOLIC BLOOD PRESSURE: 118 MMHG

## 2018-01-05 DIAGNOSIS — I35.0 AORTIC STENOSIS, MODERATE: ICD-10-CM

## 2018-01-05 DIAGNOSIS — I71.21 ANEURYSM OF ASCENDING AORTA: ICD-10-CM

## 2018-01-05 DIAGNOSIS — E66.01 MORBID OBESITY WITH BMI OF 40.0-44.9, ADULT (HCC): ICD-10-CM

## 2018-01-05 DIAGNOSIS — E78.2 MIXED HYPERLIPIDEMIA: Primary | ICD-10-CM

## 2018-01-05 DIAGNOSIS — F41.1 GAD (GENERALIZED ANXIETY DISORDER): ICD-10-CM

## 2018-01-05 DIAGNOSIS — E66.8 EXTREME OBESITY: ICD-10-CM

## 2018-01-05 DIAGNOSIS — R79.89 LOW TESTOSTERONE IN MALE: Primary | ICD-10-CM

## 2018-01-05 DIAGNOSIS — F31.9 DEPRESSED BIPOLAR I DISORDER (HCC): ICD-10-CM

## 2018-01-05 DIAGNOSIS — R07.82 INTERCOSTAL PAIN: ICD-10-CM

## 2018-01-05 PROCEDURE — 3017F COLORECTAL CA SCREEN DOC REV: CPT | Performed by: FAMILY MEDICINE

## 2018-01-05 PROCEDURE — 1036F TOBACCO NON-USER: CPT | Performed by: FAMILY MEDICINE

## 2018-01-05 PROCEDURE — 99214 OFFICE O/P EST MOD 30 MIN: CPT | Performed by: FAMILY MEDICINE

## 2018-01-05 PROCEDURE — G8484 FLU IMMUNIZE NO ADMIN: HCPCS | Performed by: FAMILY MEDICINE

## 2018-01-05 PROCEDURE — G8417 CALC BMI ABV UP PARAM F/U: HCPCS | Performed by: FAMILY MEDICINE

## 2018-01-05 PROCEDURE — G8427 DOCREV CUR MEDS BY ELIG CLIN: HCPCS | Performed by: FAMILY MEDICINE

## 2018-01-05 PROCEDURE — 96372 THER/PROPH/DIAG INJ SC/IM: CPT | Performed by: FAMILY MEDICINE

## 2018-01-05 RX ORDER — PHENTERMINE HYDROCHLORIDE 37.5 MG/1
37.5 TABLET ORAL
Qty: 30 TABLET | Refills: 0 | Status: SHIPPED | OUTPATIENT
Start: 2018-01-05 | End: 2018-02-02

## 2018-01-05 RX ORDER — TESTOSTERONE CYPIONATE 200 MG/ML
200 INJECTION INTRAMUSCULAR ONCE
Status: COMPLETED | OUTPATIENT
Start: 2018-01-05 | End: 2018-01-05

## 2018-01-05 RX ADMIN — TESTOSTERONE CYPIONATE 200 MG: 200 INJECTION INTRAMUSCULAR at 10:27

## 2018-01-05 ASSESSMENT — ENCOUNTER SYMPTOMS
VISUAL CHANGE: 0
SHORTNESS OF BREATH: 0
BACK PAIN: 0
EYE DISCHARGE: 0
WHEEZING: 0
VOMITING: 0
ABDOMINAL PAIN: 0
EYE PAIN: 0
APNEA: 0
CHEST TIGHTNESS: 0
GASTROINTESTINAL NEGATIVE: 1
PHOTOPHOBIA: 0
SWOLLEN GLANDS: 0
CONSTIPATION: 0
RESPIRATORY NEGATIVE: 1
EYES NEGATIVE: 1
DIARRHEA: 0
BLOOD IN STOOL: 0
COUGH: 0
NAUSEA: 0

## 2018-01-26 ENCOUNTER — NURSE ONLY (OUTPATIENT)
Dept: PRIMARY CARE CLINIC | Age: 58
End: 2018-01-26
Payer: MEDICARE

## 2018-01-26 DIAGNOSIS — R79.89 LOW TESTOSTERONE IN MALE: Primary | ICD-10-CM

## 2018-01-26 PROCEDURE — 96372 THER/PROPH/DIAG INJ SC/IM: CPT | Performed by: FAMILY MEDICINE

## 2018-01-26 RX ORDER — TESTOSTERONE CYPIONATE 200 MG/ML
200 INJECTION INTRAMUSCULAR ONCE
Status: COMPLETED | OUTPATIENT
Start: 2018-01-26 | End: 2018-01-26

## 2018-01-26 RX ADMIN — TESTOSTERONE CYPIONATE 200 MG: 200 INJECTION INTRAMUSCULAR at 10:53

## 2018-01-26 NOTE — PATIENT INSTRUCTIONS
Pt here for Testosterone Injection   Doing fine   Testosterone Cypionate 200mg/ml   Lot# [id number] Exp 7/19   1cc Im - Left Gluteal

## 2018-02-02 ENCOUNTER — OFFICE VISIT (OUTPATIENT)
Dept: PRIMARY CARE CLINIC | Age: 58
End: 2018-02-02
Payer: MEDICARE

## 2018-02-02 VITALS
TEMPERATURE: 97.1 F | BODY MASS INDEX: 41.75 KG/M2 | HEART RATE: 93 BPM | HEIGHT: 73 IN | WEIGHT: 315 LBS | DIASTOLIC BLOOD PRESSURE: 86 MMHG | OXYGEN SATURATION: 96 % | SYSTOLIC BLOOD PRESSURE: 120 MMHG | RESPIRATION RATE: 18 BRPM

## 2018-02-02 DIAGNOSIS — F41.1 GAD (GENERALIZED ANXIETY DISORDER): ICD-10-CM

## 2018-02-02 DIAGNOSIS — D50.8 OTHER IRON DEFICIENCY ANEMIA: ICD-10-CM

## 2018-02-02 DIAGNOSIS — E55.9 VITAMIN D DEFICIENCY: ICD-10-CM

## 2018-02-02 DIAGNOSIS — E78.2 MIXED HYPERLIPIDEMIA: ICD-10-CM

## 2018-02-02 DIAGNOSIS — R53.83 FATIGUE, UNSPECIFIED TYPE: ICD-10-CM

## 2018-02-02 DIAGNOSIS — M17.12 PRIMARY OSTEOARTHRITIS OF LEFT KNEE: Primary | ICD-10-CM

## 2018-02-02 DIAGNOSIS — F31.9 BIPOLAR 1 DISORDER (HCC): ICD-10-CM

## 2018-02-02 LAB
IRON SATURATION: 59 % (ref 11–46)
IRON: 156 UG/DL (ref 59–158)
TOTAL IRON BINDING CAPACITY: 266 UG/DL (ref 178–450)
VITAMIN D 25-HYDROXY: 29.8 NG/ML (ref 30–100)

## 2018-02-02 PROCEDURE — 3017F COLORECTAL CA SCREEN DOC REV: CPT | Performed by: FAMILY MEDICINE

## 2018-02-02 PROCEDURE — G8427 DOCREV CUR MEDS BY ELIG CLIN: HCPCS | Performed by: FAMILY MEDICINE

## 2018-02-02 PROCEDURE — 99214 OFFICE O/P EST MOD 30 MIN: CPT | Performed by: FAMILY MEDICINE

## 2018-02-02 PROCEDURE — 1036F TOBACCO NON-USER: CPT | Performed by: FAMILY MEDICINE

## 2018-02-02 PROCEDURE — G8417 CALC BMI ABV UP PARAM F/U: HCPCS | Performed by: FAMILY MEDICINE

## 2018-02-02 PROCEDURE — G8484 FLU IMMUNIZE NO ADMIN: HCPCS | Performed by: FAMILY MEDICINE

## 2018-02-02 ASSESSMENT — ENCOUNTER SYMPTOMS
CHEST TIGHTNESS: 0
ABDOMINAL PAIN: 0
COUGH: 0
APNEA: 0
EYE DISCHARGE: 0
RESPIRATORY NEGATIVE: 1
DIARRHEA: 0
PHOTOPHOBIA: 0
SHORTNESS OF BREATH: 0
BACK PAIN: 0
GASTROINTESTINAL NEGATIVE: 1
BLOOD IN STOOL: 0
NAUSEA: 0
VOMITING: 0
EYES NEGATIVE: 1
CONSTIPATION: 0

## 2018-02-06 DIAGNOSIS — E78.2 MIXED HYPERLIPIDEMIA: ICD-10-CM

## 2018-02-06 RX ORDER — FENOFIBRATE 160 MG/1
160 TABLET ORAL DAILY
Qty: 90 TABLET | Refills: 3 | Status: SHIPPED | OUTPATIENT
Start: 2018-02-06 | End: 2018-02-17

## 2018-02-06 RX ORDER — ESCITALOPRAM OXALATE 20 MG/1
20 TABLET ORAL DAILY
Qty: 90 TABLET | Refills: 3 | Status: SHIPPED | OUTPATIENT
Start: 2018-02-06 | End: 2018-04-27 | Stop reason: SDUPTHER

## 2018-02-09 ENCOUNTER — TELEPHONE (OUTPATIENT)
Dept: PRIMARY CARE CLINIC | Age: 58
End: 2018-02-09

## 2018-02-09 DIAGNOSIS — I25.10 CORONARY ARTERY DISEASE INVOLVING NATIVE CORONARY ARTERY OF NATIVE HEART WITHOUT ANGINA PECTORIS: Primary | ICD-10-CM

## 2018-02-16 ENCOUNTER — NURSE ONLY (OUTPATIENT)
Dept: PRIMARY CARE CLINIC | Age: 58
End: 2018-02-16
Payer: MEDICARE

## 2018-02-16 DIAGNOSIS — R79.89 LOW TESTOSTERONE IN MALE: Primary | ICD-10-CM

## 2018-02-16 PROCEDURE — 96372 THER/PROPH/DIAG INJ SC/IM: CPT | Performed by: FAMILY MEDICINE

## 2018-02-16 RX ORDER — TESTOSTERONE CYPIONATE 200 MG/ML
200 INJECTION INTRAMUSCULAR ONCE
Status: COMPLETED | OUTPATIENT
Start: 2018-02-16 | End: 2018-02-16

## 2018-02-16 RX ADMIN — TESTOSTERONE CYPIONATE 200 MG: 200 INJECTION INTRAMUSCULAR at 11:16

## 2018-02-17 ENCOUNTER — OFFICE VISIT (OUTPATIENT)
Dept: PRIMARY CARE CLINIC | Age: 58
End: 2018-02-17
Payer: MEDICARE

## 2018-02-17 VITALS
HEIGHT: 73 IN | OXYGEN SATURATION: 97 % | RESPIRATION RATE: 16 BRPM | HEART RATE: 94 BPM | SYSTOLIC BLOOD PRESSURE: 122 MMHG | DIASTOLIC BLOOD PRESSURE: 76 MMHG | BODY MASS INDEX: 41.75 KG/M2 | WEIGHT: 315 LBS | TEMPERATURE: 98.2 F

## 2018-02-17 DIAGNOSIS — I35.0 AORTIC STENOSIS, MODERATE: ICD-10-CM

## 2018-02-17 DIAGNOSIS — G47.33 OSA (OBSTRUCTIVE SLEEP APNEA): ICD-10-CM

## 2018-02-17 DIAGNOSIS — R53.81 MALAISE AND FATIGUE: Primary | ICD-10-CM

## 2018-02-17 DIAGNOSIS — R53.83 MALAISE AND FATIGUE: Primary | ICD-10-CM

## 2018-02-17 DIAGNOSIS — F31.9 BIPOLAR 1 DISORDER (HCC): ICD-10-CM

## 2018-02-17 DIAGNOSIS — R53.83 MALAISE AND FATIGUE: ICD-10-CM

## 2018-02-17 DIAGNOSIS — R53.81 MALAISE AND FATIGUE: ICD-10-CM

## 2018-02-17 LAB
FOLATE: 11.3 NG/ML (ref 7.3–26.1)
VITAMIN B-12: 464 PG/ML (ref 232–1245)

## 2018-02-17 PROCEDURE — G8484 FLU IMMUNIZE NO ADMIN: HCPCS | Performed by: FAMILY MEDICINE

## 2018-02-17 PROCEDURE — 99213 OFFICE O/P EST LOW 20 MIN: CPT | Performed by: FAMILY MEDICINE

## 2018-02-17 PROCEDURE — 1036F TOBACCO NON-USER: CPT | Performed by: FAMILY MEDICINE

## 2018-02-17 PROCEDURE — 3017F COLORECTAL CA SCREEN DOC REV: CPT | Performed by: FAMILY MEDICINE

## 2018-02-17 PROCEDURE — G8598 ASA/ANTIPLAT THER USED: HCPCS | Performed by: FAMILY MEDICINE

## 2018-02-17 PROCEDURE — G8417 CALC BMI ABV UP PARAM F/U: HCPCS | Performed by: FAMILY MEDICINE

## 2018-02-17 PROCEDURE — G8427 DOCREV CUR MEDS BY ELIG CLIN: HCPCS | Performed by: FAMILY MEDICINE

## 2018-02-17 RX ORDER — CHOLECALCIFEROL (VITAMIN D3) 1250 MCG
50000 CAPSULE ORAL WEEKLY
Qty: 12 CAPSULE | Refills: 0 | Status: SHIPPED | OUTPATIENT
Start: 2018-02-17 | End: 2018-05-08 | Stop reason: SDUPTHER

## 2018-02-19 LAB
ANA INTERPRETATION: NORMAL
ANTI-NUCLEAR ANTIBODY (ANA): NEGATIVE

## 2018-02-26 ENCOUNTER — OFFICE VISIT (OUTPATIENT)
Dept: CARDIOLOGY CLINIC | Age: 58
End: 2018-02-26
Payer: MEDICARE

## 2018-02-26 VITALS
HEIGHT: 73 IN | WEIGHT: 315 LBS | HEART RATE: 88 BPM | OXYGEN SATURATION: 97 % | DIASTOLIC BLOOD PRESSURE: 72 MMHG | BODY MASS INDEX: 41.75 KG/M2 | SYSTOLIC BLOOD PRESSURE: 134 MMHG | RESPIRATION RATE: 18 BRPM

## 2018-02-26 DIAGNOSIS — I35.0 AORTIC STENOSIS, MODERATE: ICD-10-CM

## 2018-02-26 DIAGNOSIS — I71.21 ASCENDING AORTIC ANEURYSM: Primary | ICD-10-CM

## 2018-02-26 DIAGNOSIS — I25.9 CHEST PAIN DUE TO MYOCARDIAL ISCHEMIA, UNSPECIFIED ISCHEMIC CHEST PAIN TYPE: ICD-10-CM

## 2018-02-26 PROCEDURE — 1036F TOBACCO NON-USER: CPT | Performed by: INTERNAL MEDICINE

## 2018-02-26 PROCEDURE — 3017F COLORECTAL CA SCREEN DOC REV: CPT | Performed by: INTERNAL MEDICINE

## 2018-02-26 PROCEDURE — 99214 OFFICE O/P EST MOD 30 MIN: CPT | Performed by: INTERNAL MEDICINE

## 2018-02-26 PROCEDURE — G8427 DOCREV CUR MEDS BY ELIG CLIN: HCPCS | Performed by: INTERNAL MEDICINE

## 2018-02-26 PROCEDURE — G8484 FLU IMMUNIZE NO ADMIN: HCPCS | Performed by: INTERNAL MEDICINE

## 2018-02-26 PROCEDURE — G8598 ASA/ANTIPLAT THER USED: HCPCS | Performed by: INTERNAL MEDICINE

## 2018-02-26 PROCEDURE — G8417 CALC BMI ABV UP PARAM F/U: HCPCS | Performed by: INTERNAL MEDICINE

## 2018-02-26 ASSESSMENT — ENCOUNTER SYMPTOMS
CHEST TIGHTNESS: 1
SHORTNESS OF BREATH: 1
GASTROINTESTINAL NEGATIVE: 1
APNEA: 0
COLOR CHANGE: 0

## 2018-02-26 NOTE — PROGRESS NOTES
Bipolar 1 disorder, Dr Derrick Saeed, Dr Pires Degree Mixed hyperlipidemia    Ascending aortic aneurysm (HCC)    SOB (shortness of breath)    Obesity due to excess calories    RBBB (right bundle branch block)    Aortic stenosis, moderate       Allergies:  No Known Allergies    Personal History:   has a past medical history of Anxiety; Anxiety, Dr Markus Roche; Aortic stenosis, moderate; Ascending aortic aneurysm ([address] Bipolar 1 disorder, Dr Markus Roche; Constipated IBS; IBS (irritable bowel syndrome); Obesity due to excess calories; RBBB (right bundle branch block); and SOB (shortness of breath). Social History:   reports that he has never smoked. He has never used smokeless tobacco. He reports that he does not drink alcohol or use drugs. Surgical History:  Past Surgical History:   Procedure Laterality Date    TONSILLECTOMY         Family History:  family history includes Cancer in his mother; Diabetes in his father; Heart Disease in his father. Current Medications:    Current Outpatient Prescriptions:     Cholecalciferol (VITAMIN D3) 11174 units CAPS, Take 50,000 Units by mouth once a week, Disp: 12 capsule, Rfl: 0    escitalopram (LEXAPRO) 20 MG tablet, Take 1 tablet by mouth daily, Disp: 90 tablet, Rfl: 3    carBAMazepine, Antipsychotic, (EQUETRO) 300 MG CP12, 2 in AM AND, 1 at HS, Disp: 90 capsule, Rfl: 2    Cholecalciferol (VITAMIN D) 2000 units CAPS capsule, Take 2,000 Units by mouth daily, Disp: , Rfl:     aspirin 81 MG chewable tablet, Take 81 mg by mouth daily, Disp: , Rfl:       Review of Systems:  Review of Systems   Constitutional: Negative for appetite change, diaphoresis and fatigue. Respiratory: Positive for chest tightness and shortness of breath. Negative for apnea. [facility]   Cardiovascular: Negative. Negative for chest pain, palpitations and leg swelling. Gastrointestinal: Negative. Endocrine: Negative. Genitourinary: Negative.     Musculoskeletal: Negative. Skin: Negative for color change. Neurological: Negative. Negative for dizziness, syncope, weakness and light-headedness. Hematological: Negative. Does not bruise/bleed easily. Psychiatric/Behavioral: Negative. Negative for agitation and behavioral problems. The patient is not nervous/anxious. All other systems reviewed and are negative. Objective  Vitals:    02/26/18 1053   BP: 134/72   Site: Right Arm   Position: Sitting   Cuff Size: Large Adult   Pulse: 88   Resp: 18   SpO2: 97%   Weight: (!) 333 lb 6.4 oz (151.2 kg)   Height: 6' 1\" (1.854 m)         Physical Exam:  Physical Exam   Constitutional: He is oriented to person, place, and time. He appears well-developed and well-nourished. HENT:   Head: Normocephalic and atraumatic. Right Ear: External ear normal.   Left Ear: External ear normal.   Mouth/Throat: Oropharynx is clear and moist.   Eyes: Conjunctivae and EOM are normal. Pupils are equal, round, and reactive to light. Neck: Normal range of motion. Neck supple. No JVD present. No tracheal deviation present. No thyromegaly present. Cardiovascular: Normal rate, regular rhythm, normal heart sounds and intact distal pulses. Pulmonary/Chest: Effort normal. No respiratory distress. He has no wheezes. He has no rales. He exhibits no tenderness. Abdominal: Soft. Bowel sounds are normal. He exhibits no distension and no mass. There is no tenderness. There is no rebound and no guarding. Musculoskeletal: Normal range of motion. Lymphadenopathy:     He has no cervical adenopathy. Neurological: He is alert and oriented to person, place, and time. He has normal reflexes. Skin: Skin is warm and dry. Psychiatric: He has a normal mood and affect. His behavior is normal. Judgment and thought content normal.           Assessment/Orders:       ICD-10-CM ICD-9-CM    1. Ascending aortic aneurysm (HCC) I71.2 441.2    2.  Aortic stenosis, moderate I35.0 424.1 ECHO Complete 2D W Doppler W Color       No orders of the defined types were placed in this encounter. There are no discontinued medications. Orders Placed This Encounter   Procedures    ECHO Complete 2D W Doppler W Color     Standing Status:   Future     Standing Expiration Date:   2/26/2019     Order Specific Question:   Reason for exam:     Answer:   aortic valve         1. Patient to have an echo  2. Patient to see me in 1 month  > I will continue to monitor patient clinically, if symptoms develop or worsen, they are to let me know ASAP or head to the nearest emergeny room. > Follow up as discussed or call office sooner if needed.  > If refills are needed after appointment contact pharmacy.     Electronically signed by: Bharathi Salgado MD  2/26/2018 1:22 PM

## 2018-03-01 ENCOUNTER — HOSPITAL ENCOUNTER (OUTPATIENT)
Dept: NON INVASIVE DIAGNOSTICS | Age: 58
Discharge: HOME | End: 2018-03-01
Payer: MEDICARE

## 2018-03-01 DIAGNOSIS — I35.0 AORTIC STENOSIS, MODERATE: ICD-10-CM

## 2018-03-02 ENCOUNTER — NURSE ONLY (OUTPATIENT)
Dept: PRIMARY CARE CLINIC | Age: 58
End: 2018-03-02
Payer: MEDICARE

## 2018-03-02 DIAGNOSIS — R79.89 LOW TESTOSTERONE IN MALE: Primary | ICD-10-CM

## 2018-03-02 PROCEDURE — 96372 THER/PROPH/DIAG INJ SC/IM: CPT | Performed by: FAMILY MEDICINE

## 2018-03-02 RX ORDER — TESTOSTERONE CYPIONATE 200 MG/ML
200 INJECTION INTRAMUSCULAR ONCE
Status: COMPLETED | OUTPATIENT
Start: 2018-03-02 | End: 2018-03-02

## 2018-03-02 RX ADMIN — TESTOSTERONE CYPIONATE 200 MG: 200 INJECTION INTRAMUSCULAR at 10:56

## 2018-03-03 ASSESSMENT — ENCOUNTER SYMPTOMS
SHORTNESS OF BREATH: 0
ABDOMINAL PAIN: 0
CHEST TIGHTNESS: 0
BLOOD IN STOOL: 0

## 2018-03-03 NOTE — PROGRESS NOTES
Medication Sig Dispense Refill    Cholecalciferol (VITAMIN D3) 28572 units CAPS Take 50,000 Units by mouth once a week 12 capsule 0    escitalopram (LEXAPRO) 20 MG tablet Take 1 tablet by mouth daily 90 tablet 3    carBAMazepine, Antipsychotic, (EQUETRO) 300 MG CP12 2 in AM AND, 1 at HS 90 capsule 2    Cholecalciferol (VITAMIN D) 2000 units CAPS capsule Take 2,000 Units by mouth daily      aspirin 81 MG chewable tablet Take 81 mg by mouth daily             [facility] [employer] Maintenance   Topic Date Due    Hepatitis C screen  1960    HIV screen  03/30/1976    DTaP/Tdap/Td vaccine (1 - Tdap) 03/30/1980    Shingles Vaccine (1 of 2 - 2 Dose Series) 03/30/2011    Flu vaccine (1) 02/17/2019 (Originally 9/1/2017)    Lipid screen  08/15/2022    Colon cancer screen colonoscopy  08/12/2023        HEALTH MAINTENANCE ITEMS STILL DUE  Health Maintenance Due   Topic Date Due    Hepatitis C screen  1960    HIV screen  03/30/1976    DTaP/Tdap/Td vaccine (1 - Tdap) 03/30/1980    Shingles Vaccine (1 of 2 - 2 Dose Series) 03/30/2011         Patient was advised importance of proper diet/nutrition addition adequate hydration. Patient follow-up with us as scheduled. Return in about 3 months (around 5/17/2018) for Review of Labs & Diagnostic Testing, Routine follow up.     Karen Coker DO

## 2018-03-06 DIAGNOSIS — F41.1 GAD (GENERALIZED ANXIETY DISORDER): ICD-10-CM

## 2018-03-07 ENCOUNTER — HOSPITAL ENCOUNTER (OUTPATIENT)
Dept: NON INVASIVE DIAGNOSTICS | Age: 58
Discharge: HOME | End: 2018-03-07
Payer: MEDICARE

## 2018-03-07 LAB
LV EF: 53 %
LVEF MODALITY: NORMAL

## 2018-03-07 PROCEDURE — 93306 TTE W/DOPPLER COMPLETE: CPT

## 2018-03-07 NOTE — TELEPHONE ENCOUNTER
Patient was last seen on 2-17-18  Patient needs this to go to his mail away    Medication is pending  Please approve or deny this request

## 2018-03-23 ENCOUNTER — NURSE ONLY (OUTPATIENT)
Dept: PRIMARY CARE CLINIC | Age: 58
End: 2018-03-23
Payer: MEDICARE

## 2018-03-23 DIAGNOSIS — R79.89 LOW TESTOSTERONE IN MALE: Primary | ICD-10-CM

## 2018-03-23 PROCEDURE — 96372 THER/PROPH/DIAG INJ SC/IM: CPT | Performed by: FAMILY MEDICINE

## 2018-03-23 RX ORDER — TESTOSTERONE CYPIONATE 200 MG/ML
200 INJECTION INTRAMUSCULAR ONCE
Status: COMPLETED | OUTPATIENT
Start: 2018-03-23 | End: 2018-03-23

## 2018-03-23 RX ADMIN — TESTOSTERONE CYPIONATE 200 MG: 200 INJECTION INTRAMUSCULAR at 10:45

## 2018-03-27 ENCOUNTER — OFFICE VISIT (OUTPATIENT)
Dept: CARDIOLOGY CLINIC | Age: 58
End: 2018-03-27
Payer: MEDICARE

## 2018-03-27 VITALS
OXYGEN SATURATION: 97 % | BODY MASS INDEX: 44.2 KG/M2 | DIASTOLIC BLOOD PRESSURE: 68 MMHG | WEIGHT: 315 LBS | HEART RATE: 84 BPM | SYSTOLIC BLOOD PRESSURE: 132 MMHG | RESPIRATION RATE: 16 BRPM

## 2018-03-27 DIAGNOSIS — I71.21 ASCENDING AORTIC ANEURYSM: Primary | ICD-10-CM

## 2018-03-27 DIAGNOSIS — I35.0 AORTIC STENOSIS, MODERATE: ICD-10-CM

## 2018-03-27 DIAGNOSIS — R06.02 SOB (SHORTNESS OF BREATH): ICD-10-CM

## 2018-03-27 PROCEDURE — G8598 ASA/ANTIPLAT THER USED: HCPCS | Performed by: INTERNAL MEDICINE

## 2018-03-27 PROCEDURE — 99213 OFFICE O/P EST LOW 20 MIN: CPT | Performed by: INTERNAL MEDICINE

## 2018-03-27 PROCEDURE — G8484 FLU IMMUNIZE NO ADMIN: HCPCS | Performed by: INTERNAL MEDICINE

## 2018-03-27 PROCEDURE — G8417 CALC BMI ABV UP PARAM F/U: HCPCS | Performed by: INTERNAL MEDICINE

## 2018-03-27 PROCEDURE — G8427 DOCREV CUR MEDS BY ELIG CLIN: HCPCS | Performed by: INTERNAL MEDICINE

## 2018-03-27 PROCEDURE — 3017F COLORECTAL CA SCREEN DOC REV: CPT | Performed by: INTERNAL MEDICINE

## 2018-03-27 PROCEDURE — 1036F TOBACCO NON-USER: CPT | Performed by: INTERNAL MEDICINE

## 2018-03-27 ASSESSMENT — ENCOUNTER SYMPTOMS
SHORTNESS OF BREATH: 1
APNEA: 0
CHEST TIGHTNESS: 0

## 2018-03-27 NOTE — PROGRESS NOTES
drugs.    Surgical History:  Past Surgical History:   Procedure Laterality Date    TONSILLECTOMY         Family History:  family history includes Cancer in his mother; Diabetes in his father; Heart Disease in his father. Current Medications:    Current Outpatient Prescriptions:     carBAMazepine, Antipsychotic, (EQUETRO) 300 MG CP12, 2 in AM AND, 1 at HS, Disp: 270 capsule, Rfl: 1    Cholecalciferol (VITAMIN D3) 37525 units CAPS, Take 50,000 Units by mouth once a week, Disp: 12 capsule, Rfl: 0    escitalopram (LEXAPRO) 20 MG tablet, Take 1 tablet by mouth daily, Disp: 90 tablet, Rfl: 3    Cholecalciferol (VITAMIN D) 2000 units CAPS capsule, Take 2,000 Units by mouth daily, Disp: , Rfl:     aspirin 81 MG chewable tablet, Take 81 mg by mouth daily, Disp: , Rfl:       Review of Systems:  Review of Systems   Constitutional: Negative for appetite change and fatigue. Respiratory: Positive for shortness of breath. Negative for apnea and chest tightness. [place]   Cardiovascular: Negative for chest pain, palpitations and leg swelling. Neurological: Positive for light-headedness. Negative for dizziness, syncope, weakness and headaches. Hematological: Does not bruise/bleed easily. Psychiatric/Behavioral: Negative for agitation, behavioral problems and confusion. The patient is not nervous/anxious and is not hyperactive. All other systems reviewed and are negative. Objective  Vitals:    03/27/18 1522   BP: 132/68   Site: Right Arm   Position: Sitting   Cuff Size: Large Adult   Pulse: 84   Resp: 16   SpO2: 97%   Weight: (!) 335 lb (152 kg)         Physical Exam:  Physical Exam   Constitutional: He is oriented to person, place, and time. He appears well-developed and well-nourished. Cardiovascular: Normal rate, regular rhythm, normal heart sounds and intact distal pulses. Pulmonary/Chest: Effort normal and breath sounds normal.   Musculoskeletal: Normal range of motion.    Neurological: He is

## 2018-04-06 ENCOUNTER — NURSE ONLY (OUTPATIENT)
Dept: PRIMARY CARE CLINIC | Age: 58
End: 2018-04-06
Payer: MEDICARE

## 2018-04-06 DIAGNOSIS — R79.89 LOW TESTOSTERONE IN MALE: Primary | ICD-10-CM

## 2018-04-06 PROCEDURE — 96372 THER/PROPH/DIAG INJ SC/IM: CPT | Performed by: FAMILY MEDICINE

## 2018-04-06 RX ORDER — TESTOSTERONE CYPIONATE 200 MG/ML
200 INJECTION INTRAMUSCULAR ONCE
Status: COMPLETED | OUTPATIENT
Start: 2018-04-06 | End: 2018-04-06

## 2018-04-06 RX ADMIN — TESTOSTERONE CYPIONATE 200 MG: 200 INJECTION INTRAMUSCULAR at 10:31

## 2018-04-20 ENCOUNTER — NURSE ONLY (OUTPATIENT)
Dept: PRIMARY CARE CLINIC | Age: 58
End: 2018-04-20
Payer: MEDICARE

## 2018-04-20 DIAGNOSIS — R79.89 LOW TESTOSTERONE IN MALE: Primary | ICD-10-CM

## 2018-04-20 PROCEDURE — 96372 THER/PROPH/DIAG INJ SC/IM: CPT | Performed by: FAMILY MEDICINE

## 2018-04-20 RX ORDER — TESTOSTERONE CYPIONATE 200 MG/ML
200 INJECTION INTRAMUSCULAR ONCE
Status: COMPLETED | OUTPATIENT
Start: 2018-04-20 | End: 2018-04-20

## 2018-04-20 RX ADMIN — TESTOSTERONE CYPIONATE 200 MG: 200 INJECTION INTRAMUSCULAR at 10:43

## 2018-04-28 RX ORDER — ESCITALOPRAM OXALATE 20 MG/1
20 TABLET ORAL DAILY
Qty: 90 TABLET | Refills: 3 | Status: SHIPPED | OUTPATIENT
Start: 2018-04-28

## 2018-05-04 ENCOUNTER — NURSE ONLY (OUTPATIENT)
Dept: PRIMARY CARE CLINIC | Age: 58
End: 2018-05-04
Payer: MEDICARE

## 2018-05-04 DIAGNOSIS — R79.89 LOW TESTOSTERONE IN MALE: Primary | ICD-10-CM

## 2018-05-04 PROCEDURE — 96372 THER/PROPH/DIAG INJ SC/IM: CPT | Performed by: FAMILY MEDICINE

## 2018-05-04 RX ORDER — TESTOSTERONE CYPIONATE 200 MG/ML
200 INJECTION INTRAMUSCULAR ONCE
Status: COMPLETED | OUTPATIENT
Start: 2018-05-04 | End: 2018-05-04

## 2018-05-04 RX ADMIN — TESTOSTERONE CYPIONATE 200 MG: 200 INJECTION INTRAMUSCULAR at 10:48

## 2018-05-08 ENCOUNTER — OFFICE VISIT (OUTPATIENT)
Dept: PRIMARY CARE CLINIC | Age: 58
End: 2018-05-08
Payer: MEDICARE

## 2018-05-08 VITALS
HEART RATE: 80 BPM | SYSTOLIC BLOOD PRESSURE: 118 MMHG | RESPIRATION RATE: 14 BRPM | DIASTOLIC BLOOD PRESSURE: 80 MMHG | BODY MASS INDEX: 41.75 KG/M2 | TEMPERATURE: 97.1 F | HEIGHT: 73 IN | WEIGHT: 315 LBS | OXYGEN SATURATION: 98 %

## 2018-05-08 DIAGNOSIS — R53.83 MALAISE AND FATIGUE: ICD-10-CM

## 2018-05-08 DIAGNOSIS — E55.9 VITAMIN D DEFICIENCY: ICD-10-CM

## 2018-05-08 DIAGNOSIS — R53.81 MALAISE AND FATIGUE: ICD-10-CM

## 2018-05-08 DIAGNOSIS — R73.9 HYPERGLYCEMIA: ICD-10-CM

## 2018-05-08 DIAGNOSIS — R53.83 MALAISE AND FATIGUE: Primary | ICD-10-CM

## 2018-05-08 DIAGNOSIS — R53.81 MALAISE AND FATIGUE: Primary | ICD-10-CM

## 2018-05-08 DIAGNOSIS — E66.09 CLASS 1 OBESITY DUE TO EXCESS CALORIES WITH SERIOUS COMORBIDITY IN ADULT, UNSPECIFIED BMI: ICD-10-CM

## 2018-05-08 LAB
GLUCOSE BLD-MCNC: 94 MG/DL
HBA1C MFR BLD: 5.2 %
VITAMIN D 25-HYDROXY: 43.5 NG/ML (ref 30–100)

## 2018-05-08 PROCEDURE — G8427 DOCREV CUR MEDS BY ELIG CLIN: HCPCS | Performed by: FAMILY MEDICINE

## 2018-05-08 PROCEDURE — 1036F TOBACCO NON-USER: CPT | Performed by: FAMILY MEDICINE

## 2018-05-08 PROCEDURE — 82962 GLUCOSE BLOOD TEST: CPT | Performed by: FAMILY MEDICINE

## 2018-05-08 PROCEDURE — 99214 OFFICE O/P EST MOD 30 MIN: CPT | Performed by: FAMILY MEDICINE

## 2018-05-08 PROCEDURE — 83036 HEMOGLOBIN GLYCOSYLATED A1C: CPT | Performed by: FAMILY MEDICINE

## 2018-05-08 PROCEDURE — G8598 ASA/ANTIPLAT THER USED: HCPCS | Performed by: FAMILY MEDICINE

## 2018-05-08 PROCEDURE — 3017F COLORECTAL CA SCREEN DOC REV: CPT | Performed by: FAMILY MEDICINE

## 2018-05-08 PROCEDURE — G8417 CALC BMI ABV UP PARAM F/U: HCPCS | Performed by: FAMILY MEDICINE

## 2018-05-08 RX ORDER — CHOLECALCIFEROL (VITAMIN D3) 1250 MCG
50000 CAPSULE ORAL WEEKLY
Qty: 12 CAPSULE | Refills: 0 | Status: SHIPPED | OUTPATIENT
Start: 2018-05-08 | End: 2018-07-14 | Stop reason: SDUPTHER

## 2018-05-08 ASSESSMENT — ENCOUNTER SYMPTOMS
SHORTNESS OF BREATH: 0
BACK PAIN: 0
RESPIRATORY NEGATIVE: 1
NAUSEA: 0
COUGH: 0
SORE THROAT: 0
BLOOD IN STOOL: 0
VISUAL CHANGE: 0
ABDOMINAL PAIN: 0
PHOTOPHOBIA: 0
CHEST TIGHTNESS: 0
CHANGE IN BOWEL HABIT: 0
EYE DISCHARGE: 0
EYES NEGATIVE: 1
SWOLLEN GLANDS: 0
DIARRHEA: 0
VOMITING: 0
CONSTIPATION: 0
APNEA: 0
GASTROINTESTINAL NEGATIVE: 1

## 2018-05-10 LAB
SEX HORMONE BINDING GLOBULIN: 19 NMOL/L (ref 11–80)
TESTOSTERONE FREE PERCENT: 2.5 % (ref 1.6–2.9)
TESTOSTERONE FREE, CALC: 191 PG/ML (ref 47–244)
TESTOSTERONE TOTAL-MALE: 750 NG/DL (ref 300–890)

## 2018-05-18 ENCOUNTER — NURSE ONLY (OUTPATIENT)
Dept: PRIMARY CARE CLINIC | Age: 58
End: 2018-05-18
Payer: MEDICARE

## 2018-05-18 DIAGNOSIS — R79.89 LOW TESTOSTERONE IN MALE: Primary | ICD-10-CM

## 2018-05-18 PROCEDURE — 96372 THER/PROPH/DIAG INJ SC/IM: CPT | Performed by: FAMILY MEDICINE

## 2018-05-18 RX ORDER — TESTOSTERONE CYPIONATE 200 MG/ML
200 INJECTION INTRAMUSCULAR ONCE
Status: COMPLETED | OUTPATIENT
Start: 2018-05-18 | End: 2018-05-18

## 2018-05-18 RX ADMIN — TESTOSTERONE CYPIONATE 200 MG: 200 INJECTION INTRAMUSCULAR at 10:30

## 2018-06-01 ENCOUNTER — NURSE ONLY (OUTPATIENT)
Dept: PRIMARY CARE CLINIC | Age: 58
End: 2018-06-01
Payer: MEDICARE

## 2018-06-01 DIAGNOSIS — R79.89 LOW TESTOSTERONE IN MALE: Primary | ICD-10-CM

## 2018-06-01 PROCEDURE — 96372 THER/PROPH/DIAG INJ SC/IM: CPT | Performed by: FAMILY MEDICINE

## 2018-06-01 RX ORDER — TESTOSTERONE CYPIONATE 200 MG/ML
200 INJECTION INTRAMUSCULAR ONCE
Status: COMPLETED | OUTPATIENT
Start: 2018-06-01 | End: 2018-06-01

## 2018-06-01 RX ADMIN — TESTOSTERONE CYPIONATE 200 MG: 200 INJECTION INTRAMUSCULAR at 10:36

## 2018-06-15 ENCOUNTER — NURSE ONLY (OUTPATIENT)
Dept: PRIMARY CARE CLINIC | Age: 58
End: 2018-06-15
Payer: MEDICARE

## 2018-06-15 DIAGNOSIS — R79.89 LOW TESTOSTERONE IN MALE: ICD-10-CM

## 2018-06-15 PROCEDURE — 96372 THER/PROPH/DIAG INJ SC/IM: CPT | Performed by: FAMILY MEDICINE

## 2018-06-15 RX ORDER — TESTOSTERONE CYPIONATE 200 MG/ML
200 INJECTION INTRAMUSCULAR ONCE
Status: COMPLETED | OUTPATIENT
Start: 2018-06-15 | End: 2018-06-15

## 2018-06-15 RX ADMIN — TESTOSTERONE CYPIONATE 200 MG: 200 INJECTION INTRAMUSCULAR at 10:33

## 2018-06-29 ENCOUNTER — NURSE ONLY (OUTPATIENT)
Dept: PRIMARY CARE CLINIC | Age: 58
End: 2018-06-29
Payer: MEDICARE

## 2018-06-29 DIAGNOSIS — R79.89 LOW TESTOSTERONE IN MALE: Primary | ICD-10-CM

## 2018-06-29 PROCEDURE — 96372 THER/PROPH/DIAG INJ SC/IM: CPT | Performed by: FAMILY MEDICINE

## 2018-06-29 RX ORDER — TESTOSTERONE CYPIONATE 200 MG/ML
200 INJECTION INTRAMUSCULAR ONCE
Status: COMPLETED | OUTPATIENT
Start: 2018-06-29 | End: 2018-06-29

## 2018-06-29 RX ADMIN — TESTOSTERONE CYPIONATE 200 MG: 200 INJECTION INTRAMUSCULAR at 10:23

## 2018-07-13 ENCOUNTER — NURSE ONLY (OUTPATIENT)
Dept: PRIMARY CARE CLINIC | Age: 58
End: 2018-07-13
Payer: MEDICARE

## 2018-07-13 DIAGNOSIS — R79.89 LOW TESTOSTERONE IN MALE: Primary | ICD-10-CM

## 2018-07-13 DIAGNOSIS — Z12.5 SCREENING FOR PROSTATE CANCER: ICD-10-CM

## 2018-07-13 PROCEDURE — 96372 THER/PROPH/DIAG INJ SC/IM: CPT | Performed by: FAMILY MEDICINE

## 2018-07-13 RX ORDER — TESTOSTERONE CYPIONATE 200 MG/ML
200 INJECTION INTRAMUSCULAR ONCE
Status: COMPLETED | OUTPATIENT
Start: 2018-07-13 | End: 2018-07-13

## 2018-07-13 RX ADMIN — TESTOSTERONE CYPIONATE 200 MG: 200 INJECTION INTRAMUSCULAR at 10:11

## 2018-07-27 ENCOUNTER — NURSE ONLY (OUTPATIENT)
Dept: PRIMARY CARE CLINIC | Age: 58
End: 2018-07-27
Payer: MEDICARE

## 2018-07-27 DIAGNOSIS — R79.89 LOW TESTOSTERONE IN MALE: Primary | ICD-10-CM

## 2018-07-27 PROCEDURE — 96372 THER/PROPH/DIAG INJ SC/IM: CPT | Performed by: FAMILY MEDICINE

## 2018-07-27 RX ORDER — TESTOSTERONE CYPIONATE 200 MG/ML
200 INJECTION INTRAMUSCULAR ONCE
Status: COMPLETED | OUTPATIENT
Start: 2018-07-27 | End: 2018-07-27

## 2018-07-27 RX ADMIN — TESTOSTERONE CYPIONATE 200 MG: 200 INJECTION INTRAMUSCULAR at 10:18

## 2018-08-08 ENCOUNTER — OFFICE VISIT (OUTPATIENT)
Dept: PRIMARY CARE CLINIC | Age: 58
End: 2018-08-08
Payer: MEDICARE

## 2018-08-08 VITALS
BODY MASS INDEX: 41.75 KG/M2 | OXYGEN SATURATION: 97 % | HEART RATE: 64 BPM | TEMPERATURE: 98.2 F | DIASTOLIC BLOOD PRESSURE: 80 MMHG | SYSTOLIC BLOOD PRESSURE: 124 MMHG | WEIGHT: 315 LBS | RESPIRATION RATE: 14 BRPM | HEIGHT: 73 IN

## 2018-08-08 DIAGNOSIS — F31.9 BIPOLAR 1 DISORDER (HCC): ICD-10-CM

## 2018-08-08 DIAGNOSIS — R79.89 LOW TESTOSTERONE IN MALE: ICD-10-CM

## 2018-08-08 DIAGNOSIS — N52.01 ERECTILE DYSFUNCTION DUE TO ARTERIAL INSUFFICIENCY: ICD-10-CM

## 2018-08-08 DIAGNOSIS — Z12.5 PROSTATE CANCER SCREENING: ICD-10-CM

## 2018-08-08 DIAGNOSIS — R79.89 LOW TESTOSTERONE IN MALE: Primary | ICD-10-CM

## 2018-08-08 DIAGNOSIS — E78.2 MIXED HYPERLIPIDEMIA: ICD-10-CM

## 2018-08-08 LAB
BASOPHILS ABSOLUTE: 0.1 K/UL (ref 0–0.2)
BASOPHILS RELATIVE PERCENT: 0.9 %
EOSINOPHILS ABSOLUTE: 0.5 K/UL (ref 0–0.7)
EOSINOPHILS RELATIVE PERCENT: 7.4 %
HCT VFR BLD CALC: 50.9 % (ref 42–52)
HEMOGLOBIN: 17 G/DL (ref 14–18)
LYMPHOCYTES ABSOLUTE: 1.4 K/UL (ref 1–4.8)
LYMPHOCYTES RELATIVE PERCENT: 21.6 %
MCH RBC QN AUTO: 32.5 PG (ref 27–31.3)
MCHC RBC AUTO-ENTMCNC: 33.4 % (ref 33–37)
MCV RBC AUTO: 97.4 FL (ref 80–100)
MONOCYTES ABSOLUTE: 0.6 K/UL (ref 0.2–0.8)
MONOCYTES RELATIVE PERCENT: 9.7 %
NEUTROPHILS ABSOLUTE: 3.8 K/UL (ref 1.4–6.5)
NEUTROPHILS RELATIVE PERCENT: 60.4 %
PDW BLD-RTO: 13.9 % (ref 11.5–14.5)
PLATELET # BLD: 259 K/UL (ref 130–400)
PROSTATE SPECIFIC ANTIGEN: 2.53 NG/ML (ref 0–3.89)
RBC # BLD: 5.23 M/UL (ref 4.7–6.1)
WBC # BLD: 6.3 K/UL (ref 4.8–10.8)

## 2018-08-08 PROCEDURE — G8417 CALC BMI ABV UP PARAM F/U: HCPCS | Performed by: FAMILY MEDICINE

## 2018-08-08 PROCEDURE — G8598 ASA/ANTIPLAT THER USED: HCPCS | Performed by: FAMILY MEDICINE

## 2018-08-08 PROCEDURE — G8427 DOCREV CUR MEDS BY ELIG CLIN: HCPCS | Performed by: FAMILY MEDICINE

## 2018-08-08 PROCEDURE — 1036F TOBACCO NON-USER: CPT | Performed by: FAMILY MEDICINE

## 2018-08-08 PROCEDURE — 99214 OFFICE O/P EST MOD 30 MIN: CPT | Performed by: FAMILY MEDICINE

## 2018-08-08 PROCEDURE — 3017F COLORECTAL CA SCREEN DOC REV: CPT | Performed by: FAMILY MEDICINE

## 2018-08-08 RX ORDER — TADALAFIL 20 MG/1
20 TABLET ORAL PRN
Qty: 10 TABLET | Refills: 2 | Status: SHIPPED | OUTPATIENT
Start: 2018-08-08

## 2018-08-08 ASSESSMENT — ENCOUNTER SYMPTOMS
GASTROINTESTINAL NEGATIVE: 1
PHOTOPHOBIA: 0
VISUAL CHANGE: 0
SHORTNESS OF BREATH: 0
RESPIRATORY NEGATIVE: 1
DIARRHEA: 0
NAUSEA: 0
EYES NEGATIVE: 1
VOMITING: 0
SORE THROAT: 0
BLOOD IN STOOL: 0
SWOLLEN GLANDS: 0
EYE DISCHARGE: 0
CHEST TIGHTNESS: 0
APNEA: 0
COUGH: 0
BACK PAIN: 0
CONSTIPATION: 0
CHANGE IN BOWEL HABIT: 0
ABDOMINAL PAIN: 0

## 2018-08-08 NOTE — PROGRESS NOTES
Subjective:      Patient ID: Batsheva Sloan is a 63 y.o. male who presents today for:  Chief Complaint   Patient presents with    Fatigue     Pt states he is still tired at times has  good and bad days. pt does get testosterone injections every two weeks , may need to be increased.  Knee Pain     pt fell of a ladder and landed on knee , cutting tree down and hit his knee, still hurts at times. sharp shooting pain, 3/10       Fatigue   This is a new problem. The current episode started more than 1 month ago. The problem occurs intermittently. The problem has been gradually improving. Associated symptoms include fatigue and myalgias. Pertinent negatives include no abdominal pain, anorexia, arthralgias, change in bowel habit, chest pain, chills, congestion, coughing, diaphoresis, fever, headaches, joint swelling, nausea, neck pain, numbness, rash, sore throat, swollen glands, urinary symptoms, vertigo, visual change, vomiting or weakness. Nothing aggravates the symptoms. Treatments tried: testosterone. The treatment provided mild relief. Knee Pain    The incident occurred at home. The injury mechanism was a fall. The pain is present in the left knee and right knee. The quality of the pain is described as shooting. The pain is at a severity of 3/10. The pain is mild. Pertinent negatives include no inability to bear weight, loss of motion, loss of sensation, muscle weakness, numbness or tingling. He reports no foreign bodies present. The symptoms are aggravated by movement. He has tried nothing for the symptoms.        Past Medical History:   Diagnosis Date    Anxiety     Anxiety, Dr Juan Escobar 5/14/2014    Aortic stenosis, moderate 1/31/2017    Ascending aortic aneurysm ([facility] [address] 1/6/2017    Bipolar 1 disorder, Dr Juan Escobar 5/14/2014    Constipated IBS 5/14/2014    IBS (irritable bowel syndrome)     Obesity due to excess calories 1/6/2017    RBBB (right bundle branch block) 1/6/2017    SOB (shortness of breath) SpO2 97%   BMI 42.22 kg/m²     Physical Exam   Constitutional: He is oriented to person, place, and time. He appears well-developed and well-nourished. HENT:   Head: Normocephalic and atraumatic. Eyes: Conjunctivae and EOM are normal. Pupils are equal, round, and reactive to light. Neck: Normal range of motion. Neck supple. No JVD present. No thyromegaly present. Cardiovascular: Normal rate, regular rhythm, normal heart sounds and intact distal pulses. Exam reveals no gallop. No murmur heard. Pulmonary/Chest: Effort normal and breath sounds normal. No respiratory distress. He has no wheezes. Abdominal: Soft. Bowel sounds are normal. He exhibits no distension. There is no tenderness. Musculoskeletal: Normal range of motion. He exhibits tenderness. He exhibits no edema. Right knee: He exhibits bony tenderness. Tenderness found. Medial joint line and lateral joint line tenderness noted. Left knee: He exhibits bony tenderness. Tenderness found. Medial joint line tenderness noted. Lymphadenopathy:     He has no cervical adenopathy. Neurological: He is alert and oriented to person, place, and time. He has normal reflexes. No cranial nerve deficit. Skin: Skin is warm and dry. No rash noted. Psychiatric: He has a normal mood and affect. Thought content normal.   Nursing note and vitals reviewed. Assessment:      Diagnosis Orders   1. Low testosterone in male  Testosterone Free And Total Male    CBC Auto Differential   2. Mixed hyperlipidemia     3. Prostate cancer screening  Psa screening   4. Bipolar 1 disorder, Dr Merline Click     5.  Erectile dysfunction due to arterial insufficiency  tadalafil (CIALIS) 20 MG tablet       Plan:      Orders Placed This Encounter   Procedures    Testosterone Free And Total Male     Standing Status:   Future     Number of Occurrences:   1     Standing Expiration Date:   8/8/2019    Psa screening     Standing Status:   Future     Number of Occurrences: 1     Standing Expiration Date:   9/8/2018    CBC Auto Differential     Standing Status:   Future     Number of Occurrences:   1     Standing Expiration Date:   8/8/2019     Orders Placed This Encounter   Medications    tadalafil (CIALIS) 20 MG tablet     Sig: Take 1 tablet by mouth as needed for Erectile Dysfunction     Dispense:  10 tablet     Refill:  2       Controlled Substances Monitoring:      Return in about 3 months (around 11/8/2018) for Review of Labs & Diagnostic Testing, Routine follow up. NATE Ceballos, CMA  , am scribing for and in the presence of Omar Nunez DO. Electronically signed by :  NATE Cota, [address] [facility], Omar Nunez DO, personally performed the services described in this documentation, as scribed by Jair Wooten in my presence, and it is both accurate and complete.  Electronically signed by: Omar Nunez DO    8/8/18 10:23 PM    Omar Nunez DO

## 2018-08-10 ENCOUNTER — NURSE ONLY (OUTPATIENT)
Dept: PRIMARY CARE CLINIC | Age: 58
End: 2018-08-10
Payer: MEDICARE

## 2018-08-10 DIAGNOSIS — R79.89 LOW TESTOSTERONE IN MALE: Primary | ICD-10-CM

## 2018-08-10 LAB
SEX HORMONE BINDING GLOBULIN: 21 NMOL/L (ref 11–80)
TESTOSTERONE FREE PERCENT: 2.3 % (ref 1.6–2.9)
TESTOSTERONE FREE, CALC: 84 PG/ML (ref 47–244)
TESTOSTERONE TOTAL-MALE: 372 NG/DL (ref 300–890)

## 2018-08-10 PROCEDURE — 96372 THER/PROPH/DIAG INJ SC/IM: CPT | Performed by: FAMILY MEDICINE

## 2018-08-10 RX ORDER — TESTOSTERONE CYPIONATE 200 MG/ML
200 INJECTION INTRAMUSCULAR ONCE
Status: COMPLETED | OUTPATIENT
Start: 2018-08-10 | End: 2018-08-10

## 2018-08-10 RX ADMIN — TESTOSTERONE CYPIONATE 200 MG: 200 INJECTION INTRAMUSCULAR at 10:53

## 2018-08-10 NOTE — PROGRESS NOTES
Patient given Testosterone 200mg IM left gluteal..  Will return in 2 weeks for next injection    Patient tolerated injection well.     Administrations This Visit     testosterone cypionate (DEPOTESTOTERONE CYPIONATE) injection 200 mg     Admin Date  08/10/2018 Action  Given Dose  200 mg Route  Intramuscular Administered By  Sofi Gamble

## 2018-08-24 ENCOUNTER — NURSE ONLY (OUTPATIENT)
Dept: PRIMARY CARE CLINIC | Age: 58
End: 2018-08-24
Payer: MEDICARE

## 2018-08-24 DIAGNOSIS — R79.89 LOW TESTOSTERONE IN MALE: Primary | ICD-10-CM

## 2018-08-24 PROCEDURE — 96372 THER/PROPH/DIAG INJ SC/IM: CPT | Performed by: FAMILY MEDICINE

## 2018-08-24 RX ORDER — TESTOSTERONE CYPIONATE 200 MG/ML
200 INJECTION INTRAMUSCULAR ONCE
Status: COMPLETED | OUTPATIENT
Start: 2018-08-24 | End: 2018-08-24

## 2018-08-24 RX ADMIN — TESTOSTERONE CYPIONATE 200 MG: 200 INJECTION INTRAMUSCULAR at 10:16

## 2018-09-07 ENCOUNTER — NURSE ONLY (OUTPATIENT)
Dept: PRIMARY CARE CLINIC | Age: 58
End: 2018-09-07
Payer: MEDICARE

## 2018-09-07 DIAGNOSIS — R79.89 LOW TESTOSTERONE IN MALE: Primary | ICD-10-CM

## 2018-09-07 PROCEDURE — 96372 THER/PROPH/DIAG INJ SC/IM: CPT | Performed by: FAMILY MEDICINE

## 2018-09-07 RX ORDER — TESTOSTERONE CYPIONATE 200 MG/ML
200 INJECTION INTRAMUSCULAR ONCE
Status: COMPLETED | OUTPATIENT
Start: 2018-09-07 | End: 2018-09-07

## 2018-09-07 RX ADMIN — TESTOSTERONE CYPIONATE 200 MG: 200 INJECTION INTRAMUSCULAR at 09:19

## 2018-09-14 DIAGNOSIS — R53.81 MALAISE AND FATIGUE: ICD-10-CM

## 2018-09-14 DIAGNOSIS — R53.83 MALAISE AND FATIGUE: ICD-10-CM

## 2018-09-15 RX ORDER — CHOLECALCIFEROL (VITAMIN D3) 1250 MCG
CAPSULE ORAL
Qty: 12 CAPSULE | Refills: 0 | Status: SHIPPED | OUTPATIENT
Start: 2018-09-15 | End: 2019-03-07 | Stop reason: SDUPTHER

## 2018-09-21 ENCOUNTER — NURSE ONLY (OUTPATIENT)
Dept: PRIMARY CARE CLINIC | Age: 58
End: 2018-09-21
Payer: MEDICARE

## 2018-09-21 DIAGNOSIS — R79.89 LOW TESTOSTERONE IN MALE: Primary | ICD-10-CM

## 2018-09-21 PROCEDURE — 96372 THER/PROPH/DIAG INJ SC/IM: CPT | Performed by: FAMILY MEDICINE

## 2018-09-21 RX ORDER — TESTOSTERONE CYPIONATE 200 MG/ML
200 INJECTION INTRAMUSCULAR ONCE
Status: COMPLETED | OUTPATIENT
Start: 2018-09-21 | End: 2018-09-21

## 2018-09-21 RX ADMIN — TESTOSTERONE CYPIONATE 200 MG: 200 INJECTION INTRAMUSCULAR at 10:55

## 2018-09-25 ENCOUNTER — OFFICE VISIT (OUTPATIENT)
Dept: CARDIOLOGY CLINIC | Age: 58
End: 2018-09-25
Payer: MEDICARE

## 2018-09-25 VITALS
DIASTOLIC BLOOD PRESSURE: 84 MMHG | WEIGHT: 315 LBS | RESPIRATION RATE: 22 BRPM | OXYGEN SATURATION: 93 % | HEART RATE: 85 BPM | HEIGHT: 73 IN | TEMPERATURE: 97.6 F | SYSTOLIC BLOOD PRESSURE: 126 MMHG | BODY MASS INDEX: 41.75 KG/M2

## 2018-09-25 DIAGNOSIS — I71.21 ASCENDING AORTIC ANEURYSM: Primary | ICD-10-CM

## 2018-09-25 DIAGNOSIS — I35.0 AORTIC STENOSIS, MODERATE: ICD-10-CM

## 2018-09-25 PROCEDURE — G8427 DOCREV CUR MEDS BY ELIG CLIN: HCPCS | Performed by: INTERNAL MEDICINE

## 2018-09-25 PROCEDURE — G8417 CALC BMI ABV UP PARAM F/U: HCPCS | Performed by: INTERNAL MEDICINE

## 2018-09-25 PROCEDURE — G8598 ASA/ANTIPLAT THER USED: HCPCS | Performed by: INTERNAL MEDICINE

## 2018-09-25 PROCEDURE — 1036F TOBACCO NON-USER: CPT | Performed by: INTERNAL MEDICINE

## 2018-09-25 PROCEDURE — 3017F COLORECTAL CA SCREEN DOC REV: CPT | Performed by: INTERNAL MEDICINE

## 2018-09-25 PROCEDURE — 99213 OFFICE O/P EST LOW 20 MIN: CPT | Performed by: INTERNAL MEDICINE

## 2018-09-25 ASSESSMENT — ENCOUNTER SYMPTOMS
SHORTNESS OF BREATH: 0
CHEST TIGHTNESS: 0
BLOOD IN STOOL: 0
VOMITING: 0
DIARRHEA: 0
APNEA: 0
NAUSEA: 0

## 2018-10-12 ENCOUNTER — NURSE ONLY (OUTPATIENT)
Dept: PRIMARY CARE CLINIC | Age: 58
End: 2018-10-12
Payer: MEDICARE

## 2018-10-12 DIAGNOSIS — R79.89 LOW TESTOSTERONE IN MALE: Primary | ICD-10-CM

## 2018-10-12 PROCEDURE — 96372 THER/PROPH/DIAG INJ SC/IM: CPT | Performed by: FAMILY MEDICINE

## 2018-10-12 RX ORDER — TESTOSTERONE CYPIONATE 200 MG/ML
200 INJECTION INTRAMUSCULAR ONCE
Status: COMPLETED | OUTPATIENT
Start: 2018-10-12 | End: 2018-10-12

## 2018-10-12 RX ADMIN — TESTOSTERONE CYPIONATE 200 MG: 200 INJECTION INTRAMUSCULAR at 10:38

## 2018-10-22 DIAGNOSIS — F41.1 GAD (GENERALIZED ANXIETY DISORDER): ICD-10-CM

## 2018-10-26 ENCOUNTER — NURSE ONLY (OUTPATIENT)
Dept: PRIMARY CARE CLINIC | Age: 58
End: 2018-10-26
Payer: MEDICARE

## 2018-10-26 DIAGNOSIS — R79.89 LOW TESTOSTERONE IN MALE: Primary | ICD-10-CM

## 2018-10-26 PROCEDURE — 96372 THER/PROPH/DIAG INJ SC/IM: CPT | Performed by: FAMILY MEDICINE

## 2018-10-26 RX ORDER — TESTOSTERONE CYPIONATE 200 MG/ML
200 INJECTION INTRAMUSCULAR
COMMUNITY
Start: 2018-10-26 | End: 2028-10-26

## 2018-10-26 RX ORDER — TESTOSTERONE CYPIONATE 200 MG/ML
200 INJECTION INTRAMUSCULAR ONCE
Status: COMPLETED | OUTPATIENT
Start: 2018-10-26 | End: 2018-10-26

## 2018-10-26 RX ADMIN — TESTOSTERONE CYPIONATE 200 MG: 200 INJECTION INTRAMUSCULAR at 10:40

## 2018-11-08 ENCOUNTER — OFFICE VISIT (OUTPATIENT)
Dept: PRIMARY CARE CLINIC | Age: 58
End: 2018-11-08
Payer: MEDICARE

## 2018-11-08 VITALS
HEIGHT: 73 IN | BODY MASS INDEX: 41.75 KG/M2 | SYSTOLIC BLOOD PRESSURE: 142 MMHG | HEART RATE: 77 BPM | DIASTOLIC BLOOD PRESSURE: 84 MMHG | TEMPERATURE: 98.3 F | OXYGEN SATURATION: 97 % | RESPIRATION RATE: 16 BRPM | WEIGHT: 315 LBS

## 2018-11-08 DIAGNOSIS — F41.9 ANXIETY: ICD-10-CM

## 2018-11-08 DIAGNOSIS — M17.11 PRIMARY OSTEOARTHRITIS OF RIGHT KNEE: ICD-10-CM

## 2018-11-08 DIAGNOSIS — E78.2 MIXED HYPERLIPIDEMIA: ICD-10-CM

## 2018-11-08 DIAGNOSIS — R79.89 LOW TESTOSTERONE IN MALE: Primary | ICD-10-CM

## 2018-11-08 DIAGNOSIS — I35.0 AORTIC STENOSIS, MODERATE: ICD-10-CM

## 2018-11-08 DIAGNOSIS — R79.89 LOW TESTOSTERONE IN MALE: ICD-10-CM

## 2018-11-08 PROCEDURE — G8417 CALC BMI ABV UP PARAM F/U: HCPCS | Performed by: FAMILY MEDICINE

## 2018-11-08 PROCEDURE — G8484 FLU IMMUNIZE NO ADMIN: HCPCS | Performed by: FAMILY MEDICINE

## 2018-11-08 PROCEDURE — 99213 OFFICE O/P EST LOW 20 MIN: CPT | Performed by: FAMILY MEDICINE

## 2018-11-08 PROCEDURE — G8427 DOCREV CUR MEDS BY ELIG CLIN: HCPCS | Performed by: FAMILY MEDICINE

## 2018-11-08 PROCEDURE — 1036F TOBACCO NON-USER: CPT | Performed by: FAMILY MEDICINE

## 2018-11-08 PROCEDURE — G8598 ASA/ANTIPLAT THER USED: HCPCS | Performed by: FAMILY MEDICINE

## 2018-11-08 PROCEDURE — 3017F COLORECTAL CA SCREEN DOC REV: CPT | Performed by: FAMILY MEDICINE

## 2018-11-08 ASSESSMENT — ENCOUNTER SYMPTOMS
DIARRHEA: 0
CONSTIPATION: 0
COUGH: 0
BACK PAIN: 0
NAUSEA: 0
EYE DISCHARGE: 0
EYES NEGATIVE: 1
SHORTNESS OF BREATH: 0
BLOOD IN STOOL: 0
CHEST TIGHTNESS: 0
ABDOMINAL PAIN: 0
APNEA: 0
RESPIRATORY NEGATIVE: 1
GASTROINTESTINAL NEGATIVE: 1
PHOTOPHOBIA: 0
VOMITING: 0

## 2018-11-08 NOTE — PROGRESS NOTES
Subjective:      Patient ID: Maritza Hargrove is a 63 y.o. male who presents today for:  Chief Complaint   Patient presents with    Knee Pain     pt f/u for knee pain, pt states it is better.  Fatigue     pt states testosterone is helping , not noticing alot of fatigue. HPI   Low Testosterone  Pt was started on Testosterone injections every two weeks. He admits to higher energy levels with injections. He admits to napping every few days during the day for about 1-2 hours. Knee pain  Pt fell off a ladder in August causing bilateral knee pain. He admits this pain has resolved with no swelling. He does not feel like his knees are going to give out on him. Past Medical History:   Diagnosis Date    Anxiety     Anxiety, Dr Reuben Gregg 5/14/2014    Aortic stenosis, moderate 1/31/2017    Ascending aortic aneurysm ([facility], [address] 1/6/2017    Bipolar 1 disorder, Dr Reuben Gregg 5/14/2014    Constipated IBS 5/14/2014    IBS (irritable bowel syndrome)     Obesity due to excess calories 1/6/2017    RBBB (right bundle branch block) 1/6/2017    SOB (shortness of breath) 1/6/2017     Past Surgical History:   Procedure Laterality Date    TONSILLECTOMY       Family History   Problem Relation Age of Onset    Cancer Mother     Diabetes Father     Heart Disease Father      Social History     Social History    Marital status:      Spouse name: N/A    Number of children: N/A    Years of education: N/A     Occupational History    Not on file. Social History Main Topics    Smoking status: Never Smoker    Smokeless tobacco: Never Used    Alcohol use No    Drug use: No    Sexual activity: Not on file     Other Topics Concern    Not on file     Social History Narrative    No narrative on file     Allergies:  Patient has no known allergies. Review of Systems   Constitutional: Negative. Negative for activity change, appetite change, fatigue and fever. HENT: Negative.   Negative for congestion, nosebleeds and tinnitus. Eyes: Negative. Negative for photophobia, discharge and visual disturbance. Respiratory: Negative. Negative for apnea, cough, chest tightness and shortness of breath. Cardiovascular: Negative. Negative for chest pain and palpitations. Gastrointestinal: Negative. Negative for abdominal pain, blood in stool, constipation, diarrhea, nausea and vomiting. Genitourinary: Negative. Negative for dysuria, frequency, hematuria and urgency. Musculoskeletal: Positive for arthralgias. Negative for back pain and neck pain. Skin: Negative. Negative for pallor. Neurological: Negative. Negative for dizziness, syncope, speech difficulty, weakness, light-headedness and headaches. Psychiatric/Behavioral: Negative. Objective:   BP (!) 142/84 (Site: Left Upper Arm, Position: Sitting, Cuff Size: Medium Adult)   Pulse 77   Temp 98.3 °F (36.8 °C)   Resp 16   Ht 6' 1\" (1.854 m)   Wt (!) 325 lb (147.4 kg)   SpO2 97%   BMI 42.88 kg/m²     Physical Exam   Constitutional: He is oriented to person, place, and time. He appears well-developed and well-nourished. HENT:   Head: Normocephalic and atraumatic. Eyes: Pupils are equal, round, and reactive to light. Conjunctivae and EOM are normal.   Neck: Normal range of motion. Neck supple. No JVD present. No thyromegaly present. Cardiovascular: Normal rate, regular rhythm, normal heart sounds and intact distal pulses. Exam reveals no gallop. No murmur heard. Pulmonary/Chest: Effort normal and breath sounds normal. No respiratory distress. He has no wheezes. Abdominal: Soft. Bowel sounds are normal. He exhibits no distension. There is no tenderness. Musculoskeletal: Normal range of motion. He exhibits tenderness. He exhibits no edema. Right knee: He exhibits bony tenderness. He exhibits normal range of motion and no effusion. Tenderness found. Medial joint line tenderness noted. Lymphadenopathy:     He has no cervical adenopathy.

## 2018-11-09 ENCOUNTER — NURSE ONLY (OUTPATIENT)
Dept: PRIMARY CARE CLINIC | Age: 58
End: 2018-11-09
Payer: MEDICARE

## 2018-11-09 DIAGNOSIS — R79.89 LOW TESTOSTERONE IN MALE: Primary | ICD-10-CM

## 2018-11-09 PROCEDURE — 96372 THER/PROPH/DIAG INJ SC/IM: CPT | Performed by: FAMILY MEDICINE

## 2018-11-09 RX ORDER — TESTOSTERONE CYPIONATE 200 MG/ML
200 INJECTION INTRAMUSCULAR ONCE
Status: COMPLETED | OUTPATIENT
Start: 2018-11-09 | End: 2018-11-09

## 2018-11-09 RX ADMIN — TESTOSTERONE CYPIONATE 200 MG: 200 INJECTION INTRAMUSCULAR at 09:42

## 2018-11-11 LAB
SEX HORMONE BINDING GLOBULIN: 18 NMOL/L (ref 11–80)
TESTOSTERONE FREE PERCENT: 2.4 % (ref 1.6–2.9)
TESTOSTERONE FREE, CALC: 65 PG/ML (ref 47–244)
TESTOSTERONE TOTAL-MALE: 278 NG/DL (ref 300–890)

## 2018-11-23 NOTE — PROGRESS NOTES
[address] 07/21/2016     No results found for: LABVLDL, VLDL  No results found for: CHOLHDLRATIO  CMP:    Lab Results   Component Value Date     08/15/2017    K 4.9 08/15/2017     08/15/2017    CO2 23 08/15/2017    BUN 21 08/15/2017    CREATININE 0.83 08/15/2017    GFRAA >60.0 08/15/2017    LABGLOM >60.0 08/15/2017    GLUCOSE 94 05/08/2018    PROT 6.9 08/15/2017    LABALBU 4.2 08/15/2017    CALCIUM 8.8 08/15/2017    BILITOT 0.2 08/15/2017    ALKPHOS 144 08/15/2017    AST 21 08/15/2017    ALT 22 08/15/2017     BMP:    Lab Results   Component Value Date     08/15/2017    K 4.9 08/15/2017     08/15/2017    CO2 23 08/15/2017    BUN 21 08/15/2017    LABALBU 4.2 08/15/2017    CREATININE 0.83 08/15/2017    CALCIUM 8.8 08/15/2017    GFRAA >60.0 08/15/2017    LABGLOM >60.0 08/15/2017    GLUCOSE 94 05/08/2018     Magnesium:  No results found for: MG  TSH:  Lab Results   Component Value Date    TSH 1.750 11/30/2017       Patient Active Problem List   Diagnosis    IBS (irritable bowel syndrome)    Bipolar 1 disorder, Dr Winston Arora, Dr Rylan Sagastume Mixed hyperlipidemia    Ascending aortic aneurysm (HCC)    SOB (shortness of breath)    Obesity due to excess calories    RBBB (right bundle branch block)    Aortic stenosis, moderate    Low testosterone in male       There are no discontinued medications. Modified Medications    No medications on file       No orders of the defined types were placed in this encounter. Assessment:    1. Ascending aortic aneurysm ([address]    2. Aortic stenosis, moderate         Plan:   Stay on same medications. Patient to see me in 6 months. This note was partially generated using Dragon voice recognition system, and there may be some incorrect words, spellings, punctuation that were not noticed in checking the note before saving.         Electronically signed by Cornelia Delacruz MD on 10/1/2018 at 10:09 AM
0 = independent

## 2018-11-30 ENCOUNTER — NURSE ONLY (OUTPATIENT)
Dept: PRIMARY CARE CLINIC | Age: 58
End: 2018-11-30
Payer: MEDICARE

## 2018-11-30 DIAGNOSIS — R79.89 LOW TESTOSTERONE IN MALE: Primary | ICD-10-CM

## 2018-11-30 PROCEDURE — 96372 THER/PROPH/DIAG INJ SC/IM: CPT | Performed by: FAMILY MEDICINE

## 2018-11-30 RX ORDER — TESTOSTERONE CYPIONATE 200 MG/ML
200 INJECTION INTRAMUSCULAR ONCE
Status: COMPLETED | OUTPATIENT
Start: 2018-11-30 | End: 2018-11-30

## 2018-11-30 RX ADMIN — TESTOSTERONE CYPIONATE 200 MG: 200 INJECTION INTRAMUSCULAR at 11:18

## 2018-12-21 ENCOUNTER — NURSE ONLY (OUTPATIENT)
Dept: PRIMARY CARE CLINIC | Age: 58
End: 2018-12-21
Payer: MEDICARE

## 2018-12-21 DIAGNOSIS — R79.89 LOW TESTOSTERONE IN MALE: Primary | ICD-10-CM

## 2018-12-21 PROCEDURE — 96372 THER/PROPH/DIAG INJ SC/IM: CPT | Performed by: FAMILY MEDICINE

## 2018-12-21 RX ORDER — TESTOSTERONE CYPIONATE 200 MG/ML
200 INJECTION INTRAMUSCULAR ONCE
Status: COMPLETED | OUTPATIENT
Start: 2018-12-21 | End: 2018-12-21

## 2018-12-21 RX ADMIN — TESTOSTERONE CYPIONATE 200 MG: 200 INJECTION INTRAMUSCULAR at 11:15

## 2018-12-21 NOTE — PROGRESS NOTES
Pt here for Testosterone Injection   Doing fine   Testosterone Cypionate 200mg/ml   Lot# [id number]. 1? ? Exp 4/20   1cc Im - Right Gluteal

## 2019-01-18 ENCOUNTER — NURSE ONLY (OUTPATIENT)
Dept: PRIMARY CARE CLINIC | Age: 59
End: 2019-01-18
Payer: MEDICARE

## 2019-01-18 ENCOUNTER — OFFICE VISIT (OUTPATIENT)
Dept: PRIMARY CARE CLINIC | Age: 59
End: 2019-01-18
Payer: MEDICARE

## 2019-01-18 VITALS
DIASTOLIC BLOOD PRESSURE: 86 MMHG | TEMPERATURE: 97.2 F | RESPIRATION RATE: 14 BRPM | SYSTOLIC BLOOD PRESSURE: 138 MMHG | OXYGEN SATURATION: 97 % | BODY MASS INDEX: 41.75 KG/M2 | HEART RATE: 84 BPM | WEIGHT: 315 LBS | HEIGHT: 73 IN

## 2019-01-18 DIAGNOSIS — F31.9 BIPOLAR 1 DISORDER (HCC): ICD-10-CM

## 2019-01-18 DIAGNOSIS — R79.89 LOW TESTOSTERONE IN MALE: Primary | ICD-10-CM

## 2019-01-18 DIAGNOSIS — M25.561 ACUTE PAIN OF RIGHT KNEE: ICD-10-CM

## 2019-01-18 DIAGNOSIS — E66.01 MORBID OBESITY WITH BMI OF 40.0-44.9, ADULT (HCC): ICD-10-CM

## 2019-01-18 DIAGNOSIS — K58.9 IRRITABLE BOWEL SYNDROME WITHOUT DIARRHEA: ICD-10-CM

## 2019-01-18 DIAGNOSIS — I71.21 ASCENDING AORTIC ANEURYSM: ICD-10-CM

## 2019-01-18 DIAGNOSIS — S89.90XA KNEE INJURY, INITIAL ENCOUNTER: Primary | ICD-10-CM

## 2019-01-18 DIAGNOSIS — E78.2 MIXED HYPERLIPIDEMIA: ICD-10-CM

## 2019-01-18 PROCEDURE — G8417 CALC BMI ABV UP PARAM F/U: HCPCS | Performed by: FAMILY MEDICINE

## 2019-01-18 PROCEDURE — 96372 THER/PROPH/DIAG INJ SC/IM: CPT | Performed by: FAMILY MEDICINE

## 2019-01-18 PROCEDURE — G8484 FLU IMMUNIZE NO ADMIN: HCPCS | Performed by: FAMILY MEDICINE

## 2019-01-18 PROCEDURE — 99213 OFFICE O/P EST LOW 20 MIN: CPT | Performed by: FAMILY MEDICINE

## 2019-01-18 PROCEDURE — 1036F TOBACCO NON-USER: CPT | Performed by: FAMILY MEDICINE

## 2019-01-18 PROCEDURE — G8427 DOCREV CUR MEDS BY ELIG CLIN: HCPCS | Performed by: FAMILY MEDICINE

## 2019-01-18 PROCEDURE — 3017F COLORECTAL CA SCREEN DOC REV: CPT | Performed by: FAMILY MEDICINE

## 2019-01-18 RX ORDER — TESTOSTERONE CYPIONATE 200 MG/ML
200 INJECTION INTRAMUSCULAR ONCE
Status: COMPLETED | OUTPATIENT
Start: 2019-01-18 | End: 2019-01-18

## 2019-01-18 RX ADMIN — TESTOSTERONE CYPIONATE 200 MG: 200 INJECTION INTRAMUSCULAR at 11:05

## 2019-01-18 ASSESSMENT — ENCOUNTER SYMPTOMS
BLOOD IN STOOL: 0
GASTROINTESTINAL NEGATIVE: 1
CONSTIPATION: 0
NAUSEA: 0
ABDOMINAL PAIN: 0
PHOTOPHOBIA: 0
EYES NEGATIVE: 1
CHEST TIGHTNESS: 0
EYE DISCHARGE: 0
COUGH: 0
APNEA: 0
SHORTNESS OF BREATH: 0
RESPIRATORY NEGATIVE: 1
DIARRHEA: 0
BACK PAIN: 0
VOMITING: 0

## 2019-01-21 ENCOUNTER — TELEPHONE (OUTPATIENT)
Dept: PRIMARY CARE CLINIC | Age: 59
End: 2019-01-21

## 2019-01-21 DIAGNOSIS — M17.11 PRIMARY OSTEOARTHRITIS OF RIGHT KNEE: Primary | ICD-10-CM

## 2019-02-01 ENCOUNTER — NURSE ONLY (OUTPATIENT)
Dept: PRIMARY CARE CLINIC | Age: 59
End: 2019-02-01
Payer: MEDICARE

## 2019-02-01 DIAGNOSIS — R79.89 LOW TESTOSTERONE IN MALE: Primary | ICD-10-CM

## 2019-02-01 PROCEDURE — 96372 THER/PROPH/DIAG INJ SC/IM: CPT | Performed by: FAMILY MEDICINE

## 2019-02-01 RX ORDER — TESTOSTERONE CYPIONATE 200 MG/ML
200 INJECTION INTRAMUSCULAR ONCE
Status: COMPLETED | OUTPATIENT
Start: 2019-02-01 | End: 2019-02-01

## 2019-02-01 RX ADMIN — TESTOSTERONE CYPIONATE 200 MG: 200 INJECTION INTRAMUSCULAR at 10:06

## 2019-02-15 ENCOUNTER — NURSE ONLY (OUTPATIENT)
Dept: PRIMARY CARE CLINIC | Age: 59
End: 2019-02-15
Payer: MEDICARE

## 2019-02-15 DIAGNOSIS — E34.9 HYPOTESTOSTERONISM: Primary | ICD-10-CM

## 2019-02-15 PROCEDURE — 96372 THER/PROPH/DIAG INJ SC/IM: CPT | Performed by: FAMILY MEDICINE

## 2019-02-15 RX ORDER — TESTOSTERONE CYPIONATE 200 MG/ML
200 INJECTION INTRAMUSCULAR ONCE
Status: COMPLETED | OUTPATIENT
Start: 2019-02-15 | End: 2019-02-15

## 2019-02-15 RX ADMIN — TESTOSTERONE CYPIONATE 200 MG: 200 INJECTION INTRAMUSCULAR at 09:53

## 2019-03-01 ENCOUNTER — NURSE ONLY (OUTPATIENT)
Dept: PRIMARY CARE CLINIC | Age: 59
End: 2019-03-01
Payer: MEDICARE

## 2019-03-01 DIAGNOSIS — E34.9 HYPOTESTOSTERONISM: Primary | ICD-10-CM

## 2019-03-01 PROCEDURE — 96372 THER/PROPH/DIAG INJ SC/IM: CPT | Performed by: FAMILY MEDICINE

## 2019-03-01 RX ORDER — TESTOSTERONE CYPIONATE 200 MG/ML
200 INJECTION INTRAMUSCULAR ONCE
Status: COMPLETED | OUTPATIENT
Start: 2019-03-01 | End: 2019-03-01

## 2019-03-01 RX ADMIN — TESTOSTERONE CYPIONATE 200 MG: 200 INJECTION INTRAMUSCULAR at 10:21

## 2019-03-07 ENCOUNTER — OFFICE VISIT (OUTPATIENT)
Dept: PRIMARY CARE CLINIC | Age: 59
End: 2019-03-07
Payer: MEDICARE

## 2019-03-07 VITALS
SYSTOLIC BLOOD PRESSURE: 126 MMHG | RESPIRATION RATE: 16 BRPM | WEIGHT: 315 LBS | TEMPERATURE: 97.8 F | BODY MASS INDEX: 41.75 KG/M2 | HEART RATE: 86 BPM | OXYGEN SATURATION: 95 % | HEIGHT: 73 IN | DIASTOLIC BLOOD PRESSURE: 70 MMHG

## 2019-03-07 DIAGNOSIS — R53.81 MALAISE AND FATIGUE: ICD-10-CM

## 2019-03-07 DIAGNOSIS — R79.89 LOW TESTOSTERONE IN MALE: ICD-10-CM

## 2019-03-07 DIAGNOSIS — R53.83 MALAISE AND FATIGUE: ICD-10-CM

## 2019-03-07 DIAGNOSIS — F41.1 GAD (GENERALIZED ANXIETY DISORDER): ICD-10-CM

## 2019-03-07 DIAGNOSIS — M25.561 RIGHT KNEE PAIN, UNSPECIFIED CHRONICITY: Primary | ICD-10-CM

## 2019-03-07 PROCEDURE — G8484 FLU IMMUNIZE NO ADMIN: HCPCS | Performed by: FAMILY MEDICINE

## 2019-03-07 PROCEDURE — G8427 DOCREV CUR MEDS BY ELIG CLIN: HCPCS | Performed by: FAMILY MEDICINE

## 2019-03-07 PROCEDURE — G8417 CALC BMI ABV UP PARAM F/U: HCPCS | Performed by: FAMILY MEDICINE

## 2019-03-07 PROCEDURE — 99214 OFFICE O/P EST MOD 30 MIN: CPT | Performed by: FAMILY MEDICINE

## 2019-03-07 PROCEDURE — 1036F TOBACCO NON-USER: CPT | Performed by: FAMILY MEDICINE

## 2019-03-07 PROCEDURE — 3017F COLORECTAL CA SCREEN DOC REV: CPT | Performed by: FAMILY MEDICINE

## 2019-03-07 RX ORDER — CELECOXIB 200 MG/1
200 CAPSULE ORAL DAILY
Qty: 30 CAPSULE | Refills: 5 | Status: SHIPPED | OUTPATIENT
Start: 2019-03-07 | End: 2019-04-06

## 2019-03-07 RX ORDER — CHOLECALCIFEROL (VITAMIN D3) 1250 MCG
CAPSULE ORAL
Qty: 12 CAPSULE | Refills: 0 | Status: SHIPPED | OUTPATIENT
Start: 2019-03-07

## 2019-03-07 ASSESSMENT — ENCOUNTER SYMPTOMS
COUGH: 0
RESPIRATORY NEGATIVE: 1
NAUSEA: 0
APNEA: 0
CHEST TIGHTNESS: 0
DIARRHEA: 0
BACK PAIN: 0
ABDOMINAL PAIN: 0
BLOOD IN STOOL: 0
CONSTIPATION: 0
PHOTOPHOBIA: 0
SHORTNESS OF BREATH: 0
EYES NEGATIVE: 1
EYE DISCHARGE: 0
GASTROINTESTINAL NEGATIVE: 1
VOMITING: 0

## 2019-03-09 LAB
SEX HORMONE BINDING GLOBULIN: 19 NMOL/L (ref 11–80)
TESTOSTERONE FREE PERCENT: 2.6 % (ref 1.6–2.9)
TESTOSTERONE FREE, CALC: 195 PG/ML (ref 47–244)
TESTOSTERONE TOTAL-MALE: 765 NG/DL (ref 300–890)

## 2019-03-15 ENCOUNTER — NURSE ONLY (OUTPATIENT)
Dept: PRIMARY CARE CLINIC | Age: 59
End: 2019-03-15
Payer: MEDICARE

## 2019-03-15 DIAGNOSIS — R79.89 LOW TESTOSTERONE IN MALE: Primary | ICD-10-CM

## 2019-03-15 PROCEDURE — 96372 THER/PROPH/DIAG INJ SC/IM: CPT | Performed by: INTERNAL MEDICINE

## 2019-03-15 RX ORDER — TESTOSTERONE CYPIONATE 200 MG/ML
200 INJECTION INTRAMUSCULAR ONCE
Status: COMPLETED | OUTPATIENT
Start: 2019-03-15 | End: 2019-03-15

## 2019-03-15 RX ADMIN — TESTOSTERONE CYPIONATE 200 MG: 200 INJECTION INTRAMUSCULAR at 09:50

## 2019-03-29 ENCOUNTER — TELEPHONE (OUTPATIENT)
Dept: ADMINISTRATIVE | Age: 59
End: 2019-03-29

## 2019-04-01 DIAGNOSIS — F41.1 GAD (GENERALIZED ANXIETY DISORDER): ICD-10-CM

## 2019-04-01 NOTE — TELEPHONE ENCOUNTER
Pt wife is requesting refill on Equetro for 6 weeks. [address] told her they are on back order approximately 4-5 weeks. Last OV 3/7/19. Please advise and pt would like call back to know if this request was approved.

## 2019-05-09 ENCOUNTER — TELEPHONE (OUTPATIENT)
Dept: PRIMARY CARE CLINIC | Age: 59
End: 2019-05-09

## 2023-02-05 PROBLEM — R42 DIZZINESS: Status: ACTIVE | Noted: 2023-02-05

## 2023-02-05 PROBLEM — J34.3 HYPERTROPHY OF BOTH INFERIOR NASAL TURBINATES: Status: ACTIVE | Noted: 2023-02-05

## 2023-02-05 PROBLEM — I71.21 ASCENDING AORTIC ANEURYSM (CMS-HCC): Status: ACTIVE | Noted: 2023-02-05

## 2023-02-05 PROBLEM — H90.3 BILATERAL SENSORINEURAL HEARING LOSS: Status: ACTIVE | Noted: 2023-02-05

## 2023-02-05 PROBLEM — F32.A DEPRESSION: Status: ACTIVE | Noted: 2023-02-05

## 2023-02-05 PROBLEM — Z98.818 OTHER DENTAL PROCEDURE STATUS: Status: ACTIVE | Noted: 2023-02-05

## 2023-02-05 PROBLEM — R79.89 LOW TESTOSTERONE IN MALE: Status: ACTIVE | Noted: 2023-02-05

## 2023-02-05 PROBLEM — M25.561 KNEE PAIN, RIGHT: Status: ACTIVE | Noted: 2023-02-05

## 2023-02-05 PROBLEM — G89.29 CHRONIC BILATERAL LOW BACK PAIN WITHOUT SCIATICA: Status: ACTIVE | Noted: 2023-02-05

## 2023-02-05 PROBLEM — E78.2 MIXED HYPERLIPIDEMIA: Status: ACTIVE | Noted: 2023-02-05

## 2023-02-05 PROBLEM — M99.03 LUMBOSACRAL DYSFUNCTION: Status: ACTIVE | Noted: 2023-02-05

## 2023-02-05 PROBLEM — M99.02 SEGMENTAL DYSFUNCTION OF THORACIC REGION: Status: ACTIVE | Noted: 2023-02-05

## 2023-02-05 PROBLEM — R42 VERTIGO: Status: ACTIVE | Noted: 2023-02-05

## 2023-02-05 PROBLEM — N40.0 BPH (BENIGN PROSTATIC HYPERPLASIA): Status: ACTIVE | Noted: 2023-02-05

## 2023-02-05 PROBLEM — I45.2 RIGHT BUNDLE BRANCH BLOCK (RBBB) WITH LEFT POSTERIOR FASCICULAR BLOCK: Status: ACTIVE | Noted: 2023-02-05

## 2023-02-05 PROBLEM — M99.04 SOMATIC DYSFUNCTION OF SACROILIAC JOINT: Status: ACTIVE | Noted: 2023-02-05

## 2023-02-05 PROBLEM — M54.50 CHRONIC BILATERAL LOW BACK PAIN WITHOUT SCIATICA: Status: ACTIVE | Noted: 2023-02-05

## 2023-02-05 PROBLEM — M79.7 FIBROMYALGIA: Status: ACTIVE | Noted: 2023-02-05

## 2023-02-05 PROBLEM — J44.9 COPD (CHRONIC OBSTRUCTIVE PULMONARY DISEASE) (MULTI): Status: ACTIVE | Noted: 2023-02-05

## 2023-02-05 PROBLEM — Z95.2 AORTIC VALVE REPLACED: Status: ACTIVE | Noted: 2023-02-05

## 2023-02-05 PROBLEM — J34.2 DNS (DEVIATED NASAL SEPTUM): Status: ACTIVE | Noted: 2023-02-05

## 2023-02-05 PROBLEM — Q23.1 BICUSPID AORTIC VALVE (HHS-HCC): Status: ACTIVE | Noted: 2023-02-05

## 2023-02-05 PROBLEM — F33.41 RECURRENT MAJOR DEPRESSIVE DISORDER, IN PARTIAL REMISSION (CMS-HCC): Status: ACTIVE | Noted: 2023-02-05

## 2023-02-05 PROBLEM — I35.0 MODERATE AORTIC VALVE STENOSIS: Status: ACTIVE | Noted: 2023-02-05

## 2023-02-05 PROBLEM — Q23.81 BICUSPID AORTIC VALVE: Status: ACTIVE | Noted: 2023-02-05

## 2023-02-05 PROBLEM — E78.5 DYSLIPIDEMIA: Status: ACTIVE | Noted: 2023-02-05

## 2023-02-05 PROBLEM — M47.26 OSTEOARTHRITIS OF SPINE WITH RADICULOPATHY, LUMBAR REGION: Status: ACTIVE | Noted: 2023-02-05

## 2023-02-05 PROBLEM — I65.23 BILATERAL CAROTID ARTERY STENOSIS: Status: ACTIVE | Noted: 2023-02-05

## 2023-02-05 PROBLEM — M25.562 KNEE PAIN, LEFT: Status: ACTIVE | Noted: 2023-02-05

## 2023-02-05 PROBLEM — M99.05 SEGMENTAL AND SOMATIC DYSFUNCTION OF PELVIC REGION: Status: ACTIVE | Noted: 2023-02-05

## 2023-02-05 RX ORDER — ASPIRIN 81 MG/1
1 TABLET ORAL DAILY
COMMUNITY
End: 2023-12-06 | Stop reason: ALTCHOICE

## 2023-02-05 RX ORDER — TESTOSTERONE CYPIONATE 200 MG/ML
200 INJECTION, SOLUTION INTRAMUSCULAR
COMMUNITY
Start: 2020-09-30 | End: 2023-12-06 | Stop reason: ALTCHOICE

## 2023-02-05 RX ORDER — DIVALPROEX SODIUM 250 MG/1
1 TABLET, FILM COATED, EXTENDED RELEASE ORAL 3 TIMES DAILY
COMMUNITY
Start: 2021-11-19 | End: 2023-05-11 | Stop reason: ALTCHOICE

## 2023-02-05 RX ORDER — EZETIMIBE 10 MG/1
10 TABLET ORAL DAILY
COMMUNITY
End: 2023-05-11 | Stop reason: ALTCHOICE

## 2023-02-05 RX ORDER — TAMSULOSIN HYDROCHLORIDE 0.4 MG/1
1 CAPSULE ORAL DAILY
COMMUNITY
Start: 2020-10-07 | End: 2023-05-11 | Stop reason: ALTCHOICE

## 2023-02-05 RX ORDER — ESCITALOPRAM OXALATE 20 MG/1
1 TABLET ORAL DAILY
COMMUNITY
Start: 2017-01-24 | End: 2023-05-11 | Stop reason: ALTCHOICE

## 2023-02-05 RX ORDER — MOMETASONE FUROATE 1 MG/G
CREAM TOPICAL
COMMUNITY
Start: 2021-05-12 | End: 2023-12-06 | Stop reason: ALTCHOICE

## 2023-02-05 RX ORDER — FIBER
1 TABLET ORAL DAILY
COMMUNITY
End: 2023-12-06 | Stop reason: ALTCHOICE

## 2023-04-27 ENCOUNTER — OFFICE VISIT (OUTPATIENT)
Dept: PRIMARY CARE | Facility: CLINIC | Age: 63
End: 2023-04-27
Payer: MEDICARE

## 2023-04-27 VITALS
WEIGHT: 315 LBS | OXYGEN SATURATION: 98 % | DIASTOLIC BLOOD PRESSURE: 82 MMHG | HEART RATE: 87 BPM | HEIGHT: 73 IN | SYSTOLIC BLOOD PRESSURE: 134 MMHG | BODY MASS INDEX: 41.75 KG/M2

## 2023-04-27 DIAGNOSIS — Z13.6 SCREENING FOR HEART DISEASE: ICD-10-CM

## 2023-04-27 DIAGNOSIS — I71.21 ANEURYSM OF ASCENDING AORTA WITHOUT RUPTURE (CMS-HCC): ICD-10-CM

## 2023-04-27 DIAGNOSIS — J41.8 MIXED SIMPLE AND MUCOPURULENT CHRONIC BRONCHITIS (MULTI): ICD-10-CM

## 2023-04-27 DIAGNOSIS — R53.83 OTHER FATIGUE: ICD-10-CM

## 2023-04-27 DIAGNOSIS — F33.41 RECURRENT MAJOR DEPRESSIVE DISORDER, IN PARTIAL REMISSION (CMS-HCC): ICD-10-CM

## 2023-04-27 DIAGNOSIS — E78.2 MIXED HYPERLIPIDEMIA: ICD-10-CM

## 2023-04-27 DIAGNOSIS — Z12.5 SCREENING FOR PROSTATE CANCER: ICD-10-CM

## 2023-04-27 DIAGNOSIS — F32.A DEPRESSION, UNSPECIFIED DEPRESSION TYPE: ICD-10-CM

## 2023-04-27 DIAGNOSIS — Q82.8 ACCESSORY SKIN TAGS: ICD-10-CM

## 2023-04-27 DIAGNOSIS — M25.511 ACUTE PAIN OF RIGHT SHOULDER: ICD-10-CM

## 2023-04-27 DIAGNOSIS — E78.5 DYSLIPIDEMIA: ICD-10-CM

## 2023-04-27 DIAGNOSIS — R33.9 URINARY RETENTION: Primary | ICD-10-CM

## 2023-04-27 DIAGNOSIS — E66.01 MORBID OBESITY (MULTI): ICD-10-CM

## 2023-04-27 PROCEDURE — 11200 RMVL SKIN TAGS UP TO&INC 15: CPT | Performed by: FAMILY MEDICINE

## 2023-04-27 PROCEDURE — 3008F BODY MASS INDEX DOCD: CPT | Performed by: FAMILY MEDICINE

## 2023-04-27 PROCEDURE — 1036F TOBACCO NON-USER: CPT | Performed by: FAMILY MEDICINE

## 2023-04-27 PROCEDURE — 99214 OFFICE O/P EST MOD 30 MIN: CPT | Performed by: FAMILY MEDICINE

## 2023-04-27 ASSESSMENT — ENCOUNTER SYMPTOMS
STRIDOR: 0
TROUBLE SWALLOWING: 0
LIMITED RANGE OF MOTION: 1
SLEEP QUALITY: GOOD
CONSTIPATION: 0
CONFUSION: 0
ABDOMINAL PAIN: 0
ABDOMINAL DISTENTION: 0
DIARRHEA: 0
CONSTITUTIONAL NEGATIVE: 1
RECTAL PAIN: 0
DYSURIA: 0
APPETITE CHANGE: 0
NECK STIFFNESS: 0
HEADACHES: 0
CHEST TIGHTNESS: 0
DECREASED CONCENTRATION: 1
RHINORRHEA: 0
DIZZINESS: 0
FLANK PAIN: 0
ARTHRALGIAS: 0
NERVOUS/ANXIOUS: 1
SHORTNESS OF BREATH: 0
DEPRESSION: 1
PALPITATIONS: 0
COUGH: 0
HEMATURIA: 0
SINUS PAIN: 0
MYALGIAS: 0
PHOTOPHOBIA: 0
BLOOD IN STOOL: 0
SPEECH DIFFICULTY: 0
SEIZURES: 0
ADENOPATHY: 0
POLYPHAGIA: 0
COLOR CHANGE: 0
ACTIVITY CHANGE: 0
SLEEP DISTURBANCE: 0
FEVER: 0
DEPRESSED MOOD: 1
EYE PAIN: 0
DYSPHORIC MOOD: 0
FATIGUE: 0
AGITATION: 0
POLYDIPSIA: 0
SORE THROAT: 0
SINUS PRESSURE: 0

## 2023-04-27 NOTE — PROGRESS NOTES
Patient ID: Leno Viveros is a 63 y.o. male.    Skin Tag Removal    Date/Time: 4/27/2023 1:11 PM    Performed by: Radha Singh MA  Authorized by: Radha Singh MA    Consent:     Consent obtained:  Verbal    Consent given by:  Patient    Risks, benefits, and alternatives were discussed: yes      Risks discussed:  Bleeding, infection and pain    Alternatives discussed:  No treatment and observation  Universal protocol:     Patient identity confirmed:  Verbally with patient  Pre-procedure details:     Skin preparation:  Povidone-iodine  Sedation:     Sedation type:  None  Anesthesia:     Anesthesia method:  None  Post-procedure details:     Procedure completion:  Tolerated well, no immediate complications      Subjective   Patient ID: Leno Viveros is a 63 y.o. male who presents for Depression, Skin Tag, and Shoulder Pain.    Patient presents today for evaluation of skin tags under right eye    Patient states he stopped taking all his medications due to having issues urinating. He stopped one at a time and figured out it was the Lexapro causing issues and has not restarted taking any other medications.     Depression  Visit Type: follow-up  Patient presents with the following symptoms: decreased concentration, depressed mood, excessive worry and nervousness/anxiety.  Patient is not experiencing: confusion, palpitations, shortness of breath, suicidal ideas and suicidal planning.  Frequency of symptoms: most days   Severity: moderate   Sleep quality: good    Shoulder Pain   The pain is present in the left shoulder and right shoulder. This is a recurrent problem. The current episode started more than 1 year ago. The problem occurs constantly. The problem has been gradually worsening. The quality of the pain is described as aching. The pain is at a severity of 5/10. Associated symptoms include a limited range of motion. Pertinent negatives include no fever.        Review of Systems   Constitutional: Negative.  " Negative for activity change, appetite change, fatigue and fever.   HENT:  Negative for congestion, dental problem, ear discharge, ear pain, mouth sores, rhinorrhea, sinus pressure, sinus pain, sore throat, tinnitus and trouble swallowing.    Eyes:  Negative for photophobia, pain and visual disturbance.   Respiratory:  Negative for cough, chest tightness, shortness of breath and stridor.    Cardiovascular:  Negative for chest pain and palpitations.   Gastrointestinal:  Negative for abdominal distention, abdominal pain, blood in stool, constipation, diarrhea and rectal pain.   Endocrine: Negative for cold intolerance, heat intolerance, polydipsia, polyphagia and polyuria.   Genitourinary:  Negative for dysuria, flank pain, hematuria and urgency.   Musculoskeletal:  Negative for arthralgias, gait problem, myalgias and neck stiffness.   Skin:  Negative for color change and rash.   Allergic/Immunologic: Negative for environmental allergies and food allergies.   Neurological:  Negative for dizziness, seizures, syncope, speech difficulty and headaches.   Hematological:  Negative for adenopathy.   Psychiatric/Behavioral:  Positive for decreased concentration and depression. Negative for agitation, confusion, dysphoric mood, sleep disturbance and suicidal ideas. The patient is nervous/anxious.        Objective   /82   Pulse 87   Ht 1.854 m (6' 1\")   Wt (!) 155 kg (341 lb)   SpO2 98%   BMI 44.99 kg/m²     Physical Exam  Vitals reviewed.   Constitutional:       General: He is not in acute distress.     Appearance: Normal appearance. He is normal weight. He is not ill-appearing or diaphoretic.   HENT:      Head: Normocephalic.      Right Ear: Tympanic membrane and external ear normal.      Left Ear: Tympanic membrane and external ear normal.      Nose: Nose normal. No congestion.      Mouth/Throat:      Mouth: Mucous membranes are dry.      Pharynx: No posterior oropharyngeal erythema.   Eyes:      General:         " Right eye: No discharge.         Left eye: No discharge.      Extraocular Movements: Extraocular movements intact.      Conjunctiva/sclera: Conjunctivae normal.      Pupils: Pupils are equal, round, and reactive to light.   Cardiovascular:      Rate and Rhythm: Normal rate and regular rhythm.      Pulses: Normal pulses.      Heart sounds: Normal heart sounds. No murmur heard.  Pulmonary:      Effort: Pulmonary effort is normal. No respiratory distress.      Breath sounds: Normal breath sounds. No wheezing or rales.   Chest:      Chest wall: No tenderness.   Abdominal:      General: Abdomen is flat. Bowel sounds are normal. There is no distension.      Palpations: There is no mass.      Tenderness: There is no abdominal tenderness. There is no guarding.   Genitourinary:     Rectum: Normal.   Musculoskeletal:         General: No tenderness.      Cervical back: Normal range of motion and neck supple. No tenderness.      Right lower leg: No edema.      Left lower leg: No edema.   Skin:     General: Skin is warm and dry.      Coloration: Skin is not jaundiced.      Findings: No bruising or erythema.   Neurological:      General: No focal deficit present.      Mental Status: He is alert and oriented to person, place, and time. Mental status is at baseline.      Cranial Nerves: No cranial nerve deficit.      Sensory: No sensory deficit.      Coordination: Coordination normal.      Gait: Gait normal.   Psychiatric:         Mood and Affect: Mood normal.         Thought Content: Thought content normal.         Judgment: Judgment normal.         Assessment/Plan   Problem List Items Addressed This Visit          Genitourinary    Urinary retention - Primary    Relevant Orders    Referral to Urology       Other    Mixed hyperlipidemia    Relevant Orders    Comprehensive metabolic panel    Depression     Other Visit Diagnoses       Screening for prostate cancer        Relevant Orders    Prostate Spec.Ag,Screen    Screening for  heart disease        Relevant Orders    CT cardiac scoring wo IV contrast    Acute pain of right shoulder        Relevant Orders    Referral to Orthopaedic Surgery    XR shoulder right 2+ views    Other fatigue        Relevant Orders    TSH    Morbid obesity (CMS/HCC)        Relevant Medications    tirzepatide (Mounjaro) 2.5 mg/0.5 mL pen injector    BMI 40.0-44.9, adult (CMS/HCC)        Relevant Medications    tirzepatide (Mounjaro) 2.5 mg/0.5 mL pen injector           Scribe Attestation  By signing my name below, I, Radha HAMMER , Scribe   attest that this documentation has been prepared under the direction and in the presence of Wai Stevenson DO.  Provider Attestation - Scribe documentation  All medical record entries made by the Scribe were at my direction and personally dictated by me. I have reviewed the chart and agree that the record accurately reflects my personal performance of the history, physical exam, discussion and plan.

## 2023-04-27 NOTE — PATIENT INSTRUCTIONS
Follow up in 3 weeks    Continue current medications and therapy for chronic medical conditions.    Patient was advised importance of proper diet/nutrition in addition adequate hydration. Patient was encouraged moderate exercise program to include 30 minutes daily for 5 days of the week or 150 minutes weekly. Patient will follow-up with us as scheduled.    Urology referral ordered for urinary retention     Right shoulder xray today     PSA, CMP and TSH ordered     CT cardiac scoring ordered     Referral to orthopedic surgery ordered     Skin tags removed from right eyelid today

## 2023-04-28 RX ORDER — TIRZEPATIDE 2.5 MG/.5ML
2.5 INJECTION, SOLUTION SUBCUTANEOUS
Qty: 2 ML | Refills: 0 | Status: SHIPPED | OUTPATIENT
Start: 2023-04-28 | End: 2023-05-11 | Stop reason: ALTCHOICE

## 2023-05-11 ENCOUNTER — PROCEDURE VISIT (OUTPATIENT)
Dept: PRIMARY CARE | Facility: CLINIC | Age: 63
End: 2023-05-11
Payer: MEDICARE

## 2023-05-11 VITALS
HEART RATE: 88 BPM | HEIGHT: 73 IN | DIASTOLIC BLOOD PRESSURE: 80 MMHG | SYSTOLIC BLOOD PRESSURE: 142 MMHG | WEIGHT: 315 LBS | RESPIRATION RATE: 16 BRPM | OXYGEN SATURATION: 95 % | BODY MASS INDEX: 41.75 KG/M2

## 2023-05-11 DIAGNOSIS — Q82.8 ACCESSORY SKIN TAGS: Primary | ICD-10-CM

## 2023-05-11 DIAGNOSIS — F41.1 GENERALIZED ANXIETY DISORDER: ICD-10-CM

## 2023-05-11 PROCEDURE — 99213 OFFICE O/P EST LOW 20 MIN: CPT | Performed by: FAMILY MEDICINE

## 2023-05-11 PROCEDURE — 11200 RMVL SKIN TAGS UP TO&INC 15: CPT | Performed by: FAMILY MEDICINE

## 2023-05-11 RX ORDER — CLONAZEPAM 0.5 MG/1
0.5 TABLET ORAL DAILY
Qty: 30 TABLET | Refills: 0 | Status: SHIPPED | OUTPATIENT
Start: 2023-05-11 | End: 2023-12-06 | Stop reason: ALTCHOICE

## 2023-05-11 ASSESSMENT — ENCOUNTER SYMPTOMS
PALPITATIONS: 0
COUGH: 0
APPETITE CHANGE: 0
ACTIVITY CHANGE: 0
MYALGIAS: 0
POLYPHAGIA: 0
RHINORRHEA: 0
DECREASED CONCENTRATION: 0
ADENOPATHY: 0
CONFUSION: 0
HEMATURIA: 0
RECTAL PAIN: 0
SHORTNESS OF BREATH: 0
POLYDIPSIA: 0
HEADACHES: 0
SINUS PRESSURE: 0
NERVOUS/ANXIOUS: 0
ABDOMINAL PAIN: 0
SLEEP DISTURBANCE: 0
STRIDOR: 0
BLOOD IN STOOL: 0
FEVER: 0
DIARRHEA: 0
DYSURIA: 0
CHEST TIGHTNESS: 0
SEIZURES: 0
AGITATION: 0
FATIGUE: 0
EYE PAIN: 0
PHOTOPHOBIA: 0
CONSTITUTIONAL NEGATIVE: 1
ARTHRALGIAS: 0
FLANK PAIN: 0
DIZZINESS: 0
SINUS PAIN: 0
COLOR CHANGE: 0
ABDOMINAL DISTENTION: 0
NECK STIFFNESS: 0
SPEECH DIFFICULTY: 0
SORE THROAT: 0
DYSPHORIC MOOD: 0
CONSTIPATION: 0
TROUBLE SWALLOWING: 0

## 2023-05-11 NOTE — PATIENT INSTRUCTIONS
Follow up in    Continue current medications and therapy for chronic medical conditions.    Patient was advised importance of proper diet/nutrition in addition adequate hydration. Patient was encouraged moderate exercise program to include 30 minutes daily for 5 days of the week or 150 minutes weekly. Patient will follow-up with us as scheduled.    Skin tags removed from bilateral under arms and upper back /excised.  See procedural note    Multiple skin tags 3 upper back, axillary, cervical and perineal regions were cleansed with Betadine and excised.  Monsel solution used for hemostasis.  Patient tolerated procedure well and was advised topical use of Neosporin.  Patient will follow-up with us as noted

## 2023-05-11 NOTE — PROGRESS NOTES
Subjective   Patient ID: Leno Viveros is a 63 y.o. male who presents for Skin Tag and Depression.    HPI     Patient is here for a skin tag removal under arms and in upper thigh.     He was having dysuria, sweating, SOB. He stopped the Lexapro, Depakote,  Zetia, aspirin. He stopped having the SOB, Sweating and less problems with Dysuria.  He would like to go back on the testosterone injections if possible.     The specialist has given him Klonopin 0.5 mg in the past for his depression only as needed. He does have a fear of death and episodes of depression.     Review of Systems   Constitutional: Negative.  Negative for activity change, appetite change, fatigue and fever.   HENT:  Negative for congestion, dental problem, ear discharge, ear pain, mouth sores, rhinorrhea, sinus pressure, sinus pain, sore throat, tinnitus and trouble swallowing.    Eyes:  Negative for photophobia, pain and visual disturbance.   Respiratory:  Negative for cough, chest tightness, shortness of breath and stridor.    Cardiovascular:  Negative for chest pain and palpitations.   Gastrointestinal:  Negative for abdominal distention, abdominal pain, blood in stool, constipation, diarrhea and rectal pain.   Endocrine: Negative for cold intolerance, heat intolerance, polydipsia, polyphagia and polyuria.   Genitourinary:  Negative for dysuria, flank pain, hematuria and urgency.   Musculoskeletal:  Negative for arthralgias, gait problem, myalgias and neck stiffness.   Skin:  Negative for color change and rash.   Allergic/Immunologic: Negative for environmental allergies and food allergies.   Neurological:  Negative for dizziness, seizures, syncope, speech difficulty and headaches.   Hematological:  Negative for adenopathy.   Psychiatric/Behavioral:  Negative for agitation, confusion, decreased concentration, dysphoric mood and sleep disturbance. The patient is not nervous/anxious.        Objective   /80 (BP Location: Left arm, Patient  "Position: Sitting, BP Cuff Size: Large adult)   Pulse 88   Resp 16   Ht 1.854 m (6' 1\")   Wt (!) 152 kg (334 lb)   SpO2 95%   BMI 44.07 kg/m²     Physical Exam  Vitals reviewed.   Constitutional:       General: He is not in acute distress.     Appearance: Normal appearance. He is obese. He is not ill-appearing or diaphoretic.   HENT:      Head: Normocephalic.      Right Ear: Tympanic membrane and external ear normal.      Left Ear: Tympanic membrane and external ear normal.      Nose: Nose normal. No congestion.      Mouth/Throat:      Mouth: Mucous membranes are dry.      Pharynx: No posterior oropharyngeal erythema.   Eyes:      General:         Right eye: No discharge.         Left eye: No discharge.      Extraocular Movements: Extraocular movements intact.      Conjunctiva/sclera: Conjunctivae normal.      Pupils: Pupils are equal, round, and reactive to light.   Cardiovascular:      Rate and Rhythm: Normal rate and regular rhythm.      Pulses: Normal pulses.      Heart sounds: Normal heart sounds. No murmur heard.  Pulmonary:      Effort: Pulmonary effort is normal. No respiratory distress.      Breath sounds: Normal breath sounds. No wheezing or rales.   Chest:      Chest wall: No tenderness.   Abdominal:      General: Abdomen is flat. Bowel sounds are normal. There is no distension.      Palpations: There is no mass.      Tenderness: There is no abdominal tenderness. There is no guarding.   Genitourinary:     Rectum: Normal.   Musculoskeletal:         General: No tenderness. Normal range of motion.      Cervical back: Normal range of motion and neck supple. No tenderness.      Right lower leg: No edema.      Left lower leg: No edema.   Skin:     General: Skin is warm and dry.      Coloration: Skin is not jaundiced.      Findings: Lesion present. No bruising or erythema.      Comments: Multiple skin tags to axillary, cervical and perineal regions.   Neurological:      General: No focal deficit present. "      Mental Status: He is alert and oriented to person, place, and time. Mental status is at baseline.      Cranial Nerves: No cranial nerve deficit.      Sensory: No sensory deficit.      Coordination: Coordination normal.      Gait: Gait normal.   Psychiatric:         Mood and Affect: Mood normal.         Thought Content: Thought content normal.         Judgment: Judgment normal.       Patient ID: Leno Viveros is a 63 y.o. male.    Skin Tag Removal    Date/Time: 5/11/2023 1:24 PM    Performed by: Wai Stevenson DO  Authorized by: Wai Stevenson DO    Consent:     Consent obtained:  Verbal    Consent given by:  Patient    Risks, benefits, and alternatives were discussed: yes      Risks discussed:  Bleeding    Alternatives discussed:  No treatment  Universal protocol:     Procedure explained and questions answered to patient or proxy's satisfaction: yes      Patient identity confirmed:  Verbally with patient  Pre-procedure details:     Skin preparation:  Povidone-iodine  Sedation:     Sedation type:  None  Anesthesia:     Anesthesia method:  None  Post-procedure details:     Procedure completion:  Tolerated well, no immediate complications      Assessment/Plan   Problem List Items Addressed This Visit          Other    Generalized anxiety disorder    Relevant Medications    clonazePAM (KlonoPIN) 0.5 mg tablet     Other Visit Diagnoses       Accessory skin tags    -  Primary    Relevant Orders    Skin Tag Removal           Scribe Attestation  By signing my name below, IRadha Scribe   attest that this documentation has been prepared under the direction and in the presence of Wai Stevenson DO.  Provider Attestation - Scribe documentation  All medical record entries made by the Scribe were at my direction and personally dictated by me. I have reviewed the chart and agree that the record accurately reflects my personal performance of the history, physical exam, discussion and plan.

## 2023-05-25 ENCOUNTER — OFFICE VISIT (OUTPATIENT)
Dept: PRIMARY CARE | Facility: CLINIC | Age: 63
End: 2023-05-25
Payer: MEDICARE

## 2023-05-25 VITALS
DIASTOLIC BLOOD PRESSURE: 82 MMHG | HEART RATE: 90 BPM | HEIGHT: 73 IN | WEIGHT: 315 LBS | SYSTOLIC BLOOD PRESSURE: 138 MMHG | OXYGEN SATURATION: 95 % | RESPIRATION RATE: 16 BRPM | BODY MASS INDEX: 41.75 KG/M2

## 2023-05-25 DIAGNOSIS — N40.1 BENIGN PROSTATIC HYPERPLASIA WITH URINARY OBSTRUCTION: ICD-10-CM

## 2023-05-25 DIAGNOSIS — L91.8 SKIN TAG: Primary | ICD-10-CM

## 2023-05-25 DIAGNOSIS — E78.5 DYSLIPIDEMIA: ICD-10-CM

## 2023-05-25 DIAGNOSIS — M79.7 FIBROMYALGIA: ICD-10-CM

## 2023-05-25 DIAGNOSIS — Q82.8 ACCESSORY SKIN TAGS: ICD-10-CM

## 2023-05-25 DIAGNOSIS — N13.8 BENIGN PROSTATIC HYPERPLASIA WITH URINARY OBSTRUCTION: ICD-10-CM

## 2023-05-25 DIAGNOSIS — F41.1 GENERALIZED ANXIETY DISORDER: ICD-10-CM

## 2023-05-25 DIAGNOSIS — R33.9 URINARY RETENTION: ICD-10-CM

## 2023-05-25 PROBLEM — E66.09 OBESITY DUE TO EXCESS CALORIES: Status: ACTIVE | Noted: 2017-01-06

## 2023-05-25 PROBLEM — E29.1 TESTICULAR HYPOFUNCTION: Status: ACTIVE | Noted: 2023-05-25

## 2023-05-25 PROBLEM — M25.519 SHOULDER PAIN: Status: ACTIVE | Noted: 2023-05-25

## 2023-05-25 PROBLEM — N46.11: Status: ACTIVE | Noted: 2023-05-25

## 2023-05-25 PROCEDURE — 99213 OFFICE O/P EST LOW 20 MIN: CPT | Performed by: FAMILY MEDICINE

## 2023-05-25 PROCEDURE — 11200 RMVL SKIN TAGS UP TO&INC 15: CPT | Performed by: FAMILY MEDICINE

## 2023-05-25 RX ORDER — ESCITALOPRAM OXALATE 20 MG/1
1 TABLET ORAL DAILY
COMMUNITY
Start: 2017-01-24 | End: 2023-12-06 | Stop reason: ALTCHOICE

## 2023-05-25 RX ORDER — DOXYCYCLINE HYCLATE 100 MG
TABLET ORAL 2 TIMES DAILY
COMMUNITY
Start: 2023-05-23 | End: 2023-12-06 | Stop reason: ALTCHOICE

## 2023-05-25 ASSESSMENT — ENCOUNTER SYMPTOMS
ABDOMINAL DISTENTION: 0
APPETITE CHANGE: 0
NECK STIFFNESS: 0
FLANK PAIN: 0
BLOOD IN STOOL: 0
FATIGUE: 0
DIARRHEA: 0
FEVER: 0
SHORTNESS OF BREATH: 0
POLYPHAGIA: 0
CONFUSION: 0
ACTIVITY CHANGE: 0
DYSURIA: 0
COUGH: 0
SINUS PRESSURE: 0
PHOTOPHOBIA: 0
ARTHRALGIAS: 0
CONSTIPATION: 0
STRIDOR: 0
RECTAL PAIN: 0
CONSTITUTIONAL NEGATIVE: 1
HEADACHES: 0
CHEST TIGHTNESS: 0
MYALGIAS: 0
SLEEP DISTURBANCE: 0
SINUS PAIN: 0
SPEECH DIFFICULTY: 0
TROUBLE SWALLOWING: 0
EYE PAIN: 0
ADENOPATHY: 0
RHINORRHEA: 0
ABDOMINAL PAIN: 0
AGITATION: 0
DECREASED CONCENTRATION: 0
COLOR CHANGE: 0
PALPITATIONS: 0
SORE THROAT: 0
NERVOUS/ANXIOUS: 0
SEIZURES: 0
DYSPHORIC MOOD: 0
DIZZINESS: 0
HEMATURIA: 0
POLYDIPSIA: 0

## 2023-05-25 NOTE — PATIENT INSTRUCTIONS
Follow up in 2 months    Continue current medications and therapy for chronic medical conditions.    Patient was advised importance of proper diet/nutrition in addition adequate hydration. Patient was encouraged moderate exercise program to include 30 minutes daily for 5 days of the week or 150 minutes weekly. Patient will follow-up with us as scheduled.    Excision of multiple skin tags to the perineum inner thighs    After obtaining proper patient consent, multiple skin tags of the perineum and inner thighs were treated with Betadine prior to excision.  Hemostasis with Monsel solution.  Topical Neosporin applied    Patient advised to follow-up with us as directed.

## 2023-05-25 NOTE — PROGRESS NOTES
"Subjective   Patient ID: Leno Viveros is a 63 y.o. male who presents for Skin Tag.    Pt is in for removal of skin tag between his thighs. Was in two weeks ago for removal of skin tag in his Axillary.     Denies any other concern.        Review of Systems   Constitutional: Negative.  Negative for activity change, appetite change, fatigue and fever.   HENT:  Negative for congestion, dental problem, ear discharge, ear pain, mouth sores, rhinorrhea, sinus pressure, sinus pain, sore throat, tinnitus and trouble swallowing.    Eyes:  Negative for photophobia, pain and visual disturbance.   Respiratory:  Negative for cough, chest tightness, shortness of breath and stridor.    Cardiovascular:  Negative for chest pain and palpitations.   Gastrointestinal:  Negative for abdominal distention, abdominal pain, blood in stool, constipation, diarrhea and rectal pain.   Endocrine: Negative for cold intolerance, heat intolerance, polydipsia, polyphagia and polyuria.   Genitourinary:  Negative for dysuria, flank pain, hematuria and urgency.   Musculoskeletal:  Negative for arthralgias, gait problem, myalgias and neck stiffness.   Skin:  Negative for color change and rash.   Allergic/Immunologic: Negative for environmental allergies and food allergies.   Neurological:  Negative for dizziness, seizures, syncope, speech difficulty and headaches.   Hematological:  Negative for adenopathy.   Psychiatric/Behavioral:  Negative for agitation, confusion, decreased concentration, dysphoric mood and sleep disturbance. The patient is not nervous/anxious.        Objective   /82   Pulse 90   Resp 16   Ht 1.854 m (6' 1\")   Wt (!) 150 kg (331 lb)   SpO2 95%   BMI 43.67 kg/m²     Physical Exam  Vitals reviewed.   Constitutional:       General: He is not in acute distress.     Appearance: Normal appearance. He is normal weight. He is not ill-appearing or diaphoretic.   HENT:      Head: Normocephalic.      Right Ear: Tympanic membrane " and external ear normal.      Left Ear: Tympanic membrane and external ear normal.      Nose: Nose normal. No congestion.      Mouth/Throat:      Mouth: Mucous membranes are dry.      Pharynx: No posterior oropharyngeal erythema.   Eyes:      General:         Right eye: No discharge.         Left eye: No discharge.      Extraocular Movements: Extraocular movements intact.      Conjunctiva/sclera: Conjunctivae normal.      Pupils: Pupils are equal, round, and reactive to light.   Cardiovascular:      Rate and Rhythm: Normal rate and regular rhythm.      Pulses: Normal pulses.      Heart sounds: Normal heart sounds. No murmur heard.  Pulmonary:      Effort: Pulmonary effort is normal. No respiratory distress.      Breath sounds: Normal breath sounds. No wheezing or rales.   Chest:      Chest wall: No tenderness.   Abdominal:      General: Abdomen is flat. Bowel sounds are normal. There is no distension.      Palpations: There is no mass.      Tenderness: There is no abdominal tenderness. There is no guarding.   Genitourinary:     Rectum: Normal.   Musculoskeletal:         General: No tenderness. Normal range of motion.      Cervical back: Normal range of motion and neck supple. No tenderness.      Right lower leg: No edema.      Left lower leg: No edema.   Skin:     General: Skin is warm and dry.      Coloration: Skin is not jaundiced.      Findings: Lesion present. No bruising or erythema.      Comments: Multiple skin tags noted to inner thighs and perineum   Neurological:      General: No focal deficit present.      Mental Status: He is alert and oriented to person, place, and time. Mental status is at baseline.      Cranial Nerves: No cranial nerve deficit.      Sensory: No sensory deficit.      Coordination: Coordination normal.      Gait: Gait normal.   Psychiatric:         Mood and Affect: Mood normal.         Thought Content: Thought content normal.         Judgment: Judgment normal.       Patient ID: Leno REYES  Jackeline is a 63 y.o. male.    Skin Tag Removal    Date/Time: 5/25/2023 12:45 PM    Performed by: Wai Stevenson DO  Authorized by: Wai Stevenson DO    Consent:     Consent obtained:  Verbal    Consent given by:  Patient    Risks, benefits, and alternatives were discussed: yes      Risks discussed:  Bleeding and pain    Alternatives discussed:  No treatment  Universal protocol:     Procedure explained and questions answered to patient or proxy's satisfaction: yes      Patient identity confirmed:  Verbally with patient  Pre-procedure details:     Skin preparation:  Povidone-iodine  Sedation:     Sedation type:  None  Anesthesia:     Anesthesia method:  None  Post-procedure details:     Procedure completion:  Tolerated well, no immediate complications    Assessment/Plan   Problem List Items Addressed This Visit          Genitourinary    BPH (benign prostatic hyperplasia)    Urinary retention       Musculoskeletal    Fibromyalgia       Other    Dyslipidemia    Generalized anxiety disorder     Other Visit Diagnoses       Skin tag    -  Primary              Scribe Attestation  By signing my name below, IEda RMA, Scribe   attest that this documentation has been prepared under the direction and in the presence of Wai Stevenson DO.   Provider Attestation - Scribe documentation    All medical record entries made by the Scribe were at my direction and personally dictated by me. I have reviewed the chart and agree that the record accurately reflects my personal performance of the history, physical exam, discussion and plan.

## 2023-09-15 DIAGNOSIS — K08.9 DENTAL DISORDER: ICD-10-CM

## 2023-09-15 NOTE — TELEPHONE ENCOUNTER
PT ZONIA EDWARDS     PT SPOUSE CALLED IN FOR AN ANTIBIOTIC FOR DENTAL WORK ON THEIR UPCOMING APT ON TUESDAY WITH DR PEDERSEN ON [facility].    [facility] DR JOHNSON

## 2023-09-16 RX ORDER — AMOXICILLIN 500 MG/1
1000 CAPSULE ORAL EVERY 12 HOURS SCHEDULED
Qty: 8 CAPSULE | Refills: 0 | Status: SHIPPED | OUTPATIENT
Start: 2023-09-16 | End: 2023-12-06 | Stop reason: ALTCHOICE

## 2023-11-01 ENCOUNTER — APPOINTMENT (OUTPATIENT)
Dept: CARDIOLOGY | Facility: CLINIC | Age: 63
End: 2023-11-01
Payer: MEDICARE

## 2023-12-05 ENCOUNTER — TELEPHONE (OUTPATIENT)
Dept: PRIMARY CARE | Facility: CLINIC | Age: 63
End: 2023-12-05
Payer: MEDICARE

## 2023-12-05 NOTE — TELEPHONE ENCOUNTER
Dr. Stevenson Pt    Pt calling to ask MANUELA if he can be seen this week for a follow up from walk in clinic on 12/2- Pt went in for possible UTI and was placed on antibiotic for difficulty urinating- Pt says it is not helping- cannot urinate while sitting down. Wants MANUELA to eval and discuss possible ultrasound and Urology referral. Please advise, thank you.     Leno [phone]

## 2023-12-06 ENCOUNTER — TELEMEDICINE (OUTPATIENT)
Dept: PRIMARY CARE | Facility: CLINIC | Age: 63
End: 2023-12-06
Payer: MEDICARE

## 2023-12-06 DIAGNOSIS — M79.7 FIBROMYALGIA: ICD-10-CM

## 2023-12-06 DIAGNOSIS — R33.9 URINARY RETENTION: Primary | ICD-10-CM

## 2023-12-06 DIAGNOSIS — F41.1 GENERALIZED ANXIETY DISORDER: ICD-10-CM

## 2023-12-06 DIAGNOSIS — N13.8 BENIGN PROSTATIC HYPERPLASIA WITH URINARY OBSTRUCTION: ICD-10-CM

## 2023-12-06 DIAGNOSIS — N40.1 BENIGN PROSTATIC HYPERPLASIA WITH URINARY OBSTRUCTION: ICD-10-CM

## 2023-12-06 DIAGNOSIS — F33.41 RECURRENT MAJOR DEPRESSIVE DISORDER, IN PARTIAL REMISSION (CMS-HCC): ICD-10-CM

## 2023-12-06 PROCEDURE — 99442 PR PHYS/QHP TELEPHONE EVALUATION 11-20 MIN: CPT | Performed by: FAMILY MEDICINE

## 2023-12-06 RX ORDER — TADALAFIL 5 MG/1
5 TABLET ORAL DAILY
Qty: 30 TABLET | Refills: 0 | Status: SHIPPED | OUTPATIENT
Start: 2023-12-06 | End: 2024-01-05

## 2023-12-06 RX ORDER — SULFAMETHOXAZOLE AND TRIMETHOPRIM 800; 160 MG/1; MG/1
1 TABLET ORAL 2 TIMES DAILY
COMMUNITY
Start: 2023-12-02 | End: 2023-12-12

## 2023-12-06 RX ORDER — TAMSULOSIN HYDROCHLORIDE 0.4 MG/1
0.4 CAPSULE ORAL DAILY
Qty: 90 CAPSULE | Refills: 1 | Status: SHIPPED | OUTPATIENT
Start: 2023-12-06

## 2023-12-06 RX ORDER — LORAZEPAM 1 MG/1
1 TABLET ORAL EVERY 6 HOURS PRN
Qty: 30 TABLET | Refills: 0 | Status: SHIPPED | OUTPATIENT
Start: 2023-12-06 | End: 2024-04-05 | Stop reason: SDUPTHER

## 2023-12-06 RX ORDER — TAMSULOSIN HYDROCHLORIDE 0.4 MG/1
1 CAPSULE ORAL DAILY
COMMUNITY
Start: 2023-06-15 | End: 2023-12-06 | Stop reason: SDUPTHER

## 2023-12-06 ASSESSMENT — ENCOUNTER SYMPTOMS: DYSURIA: 1

## 2023-12-06 NOTE — PROGRESS NOTES
Subjective   Patient ID: Leno Viveros is a 63 y.o. male who presents for Urinary Retention.    TELEPHONE CALL     Patient presents today for urinary retention. Onset was Friday. States he is unable to urinate sitting down, states he has to stand and feels like he is unable to completely empty the bladder. Patient states he did have a fever over the weekend. He is currently taking Bactrim and Tamsulosin with slight relief. He does complain of some pain and burning with urination.     Patient is requesting a referral to see urology     Difficulty Urinating   This is a recurrent problem. The current episode started in the past 7 days. The problem has been unchanged. The quality of the pain is described as burning and aching. There has been no fever. He has tried antibiotics for the symptoms.        Review of Systems   Genitourinary:  Positive for dysuria.       Objective   There were no vitals taken for this visit.    Physical Exam  Constitutional: Well developed, well nourished, alert and in no acute distress   Psychiatric: Mood calm and affect normal    Telephone Visit - Audio Communication Only      Assessment/Plan   Problem List Items Addressed This Visit             ICD-10-CM    BPH (benign prostatic hyperplasia) N40.0    Relevant Medications    tamsulosin (Flomax) 0.4 mg 24 hr capsule    tadalafil (Cialis) 5 mg tablet    Other Relevant Orders    Referral to Urology    Fibromyalgia M79.7    Recurrent major depressive disorder, in partial remission (CMS/HCC) F33.41    Relevant Medications    LORazepam (Ativan) 1 mg tablet    Urinary retention - Primary R33.9    Relevant Medications    tamsulosin (Flomax) 0.4 mg 24 hr capsule    tadalafil (Cialis) 5 mg tablet    Other Relevant Orders    Referral to Urology    Follow Up In [facility] - PCP - Established    Generalized anxiety disorder F41.1    Relevant Medications    LORazepam (Ativan) 1 mg tablet    Other Relevant Orders    Follow Up In [facility] - PCP - Established    Scribe Attestation  By signing my name below, I, HARMAN Gaston , Dianne   attest that this documentation has been prepared under the direction and in the presence of Wai Stevenson DO.   Provider Attestation - Scribe documentation    All medical record entries made by the Scribe were at my direction and personally dictated by me. I have reviewed the chart and agree that the record accurately reflects my personal performance of the history, physical exam, discussion and plan.

## 2023-12-06 NOTE — PATIENT INSTRUCTIONS
Follow up in 4 WEEKS     Continue current medications and therapy for chronic medical conditions.    Patient was advised importance of proper diet/nutrition in addition adequate hydration. Patient was encouraged moderate exercise program to include 30 minutes daily for 5 days of the week or 150 minutes weekly. Patient will follow-up with us as scheduled.    I personally reviewed the OARRS report for this patient. I have considered the risks of abuse, dependence, addiction, and diversion.    UDS/CSA: DUE    START LORAZEPAM 1 mg every 6 hours as needed anxiety    START CIALIS 5 mg daily

## 2023-12-08 ENCOUNTER — APPOINTMENT (OUTPATIENT)
Dept: CARDIOLOGY | Facility: HOSPITAL | Age: 63
End: 2023-12-08
Payer: MEDICARE

## 2023-12-08 ENCOUNTER — HOSPITAL ENCOUNTER (EMERGENCY)
Facility: HOSPITAL | Age: 63
Discharge: HOME | End: 2023-12-08
Attending: EMERGENCY MEDICINE
Payer: MEDICARE

## 2023-12-08 ENCOUNTER — APPOINTMENT (OUTPATIENT)
Dept: RADIOLOGY | Facility: HOSPITAL | Age: 63
End: 2023-12-08
Payer: MEDICARE

## 2023-12-08 VITALS
BODY MASS INDEX: 41.75 KG/M2 | DIASTOLIC BLOOD PRESSURE: 71 MMHG | HEART RATE: 80 BPM | HEIGHT: 73 IN | SYSTOLIC BLOOD PRESSURE: 149 MMHG | RESPIRATION RATE: 19 BRPM | TEMPERATURE: 97 F | OXYGEN SATURATION: 95 % | WEIGHT: 315 LBS

## 2023-12-08 DIAGNOSIS — R07.9 CHEST PAIN, UNSPECIFIED TYPE: Primary | ICD-10-CM

## 2023-12-08 LAB
ALBUMIN SERPL BCP-MCNC: 4.3 G/DL (ref 3.4–5)
ALP SERPL-CCNC: 113 U/L (ref 33–136)
ALT SERPL W P-5'-P-CCNC: 30 U/L (ref 10–52)
ANION GAP SERPL CALC-SCNC: 12 MMOL/L (ref 10–20)
AST SERPL W P-5'-P-CCNC: 21 U/L (ref 9–39)
BASOPHILS # BLD AUTO: 0.05 X10*3/UL (ref 0–0.1)
BASOPHILS NFR BLD AUTO: 0.8 %
BILIRUB SERPL-MCNC: 0.3 MG/DL (ref 0–1.2)
BNP SERPL-MCNC: 10 PG/ML (ref 0–99)
BUN SERPL-MCNC: 15 MG/DL (ref 6–23)
CALCIUM SERPL-MCNC: 9.1 MG/DL (ref 8.6–10.3)
CARDIAC TROPONIN I PNL SERPL HS: 3 NG/L (ref 0–20)
CARDIAC TROPONIN I PNL SERPL HS: <3 NG/L (ref 0–20)
CHLORIDE SERPL-SCNC: 103 MMOL/L (ref 98–107)
CO2 SERPL-SCNC: 26 MMOL/L (ref 21–32)
CREAT SERPL-MCNC: 1.14 MG/DL (ref 0.5–1.3)
D DIMER PPP FEU-MCNC: 571 NG/ML FEU
EOSINOPHIL # BLD AUTO: 0.28 X10*3/UL (ref 0–0.7)
EOSINOPHIL NFR BLD AUTO: 4.3 %
ERYTHROCYTE [DISTWIDTH] IN BLOOD BY AUTOMATED COUNT: 12 % (ref 11.5–14.5)
GFR SERPL CREATININE-BSD FRML MDRD: 73 ML/MIN/1.73M*2
GLUCOSE SERPL-MCNC: 123 MG/DL (ref 74–99)
HCT VFR BLD AUTO: 43.1 % (ref 41–52)
HGB BLD-MCNC: 15.1 G/DL (ref 13.5–17.5)
IMM GRANULOCYTES # BLD AUTO: 0.05 X10*3/UL (ref 0–0.7)
IMM GRANULOCYTES NFR BLD AUTO: 0.8 % (ref 0–0.9)
LYMPHOCYTES # BLD AUTO: 1.4 X10*3/UL (ref 1.2–4.8)
LYMPHOCYTES NFR BLD AUTO: 21.7 %
MAGNESIUM SERPL-MCNC: 2.15 MG/DL (ref 1.6–2.4)
MCH RBC QN AUTO: 32.3 PG (ref 26–34)
MCHC RBC AUTO-ENTMCNC: 35 G/DL (ref 32–36)
MCV RBC AUTO: 92 FL (ref 80–100)
MONOCYTES # BLD AUTO: 0.49 X10*3/UL (ref 0.1–1)
MONOCYTES NFR BLD AUTO: 7.6 %
NEUTROPHILS # BLD AUTO: 4.19 X10*3/UL (ref 1.2–7.7)
NEUTROPHILS NFR BLD AUTO: 64.8 %
NRBC BLD-RTO: 0 /100 WBCS (ref 0–0)
PLATELET # BLD AUTO: 293 X10*3/UL (ref 150–450)
POTASSIUM SERPL-SCNC: 4.1 MMOL/L (ref 3.5–5.3)
PROT SERPL-MCNC: 7 G/DL (ref 6.4–8.2)
RBC # BLD AUTO: 4.68 X10*6/UL (ref 4.5–5.9)
SODIUM SERPL-SCNC: 137 MMOL/L (ref 136–145)
WBC # BLD AUTO: 6.5 X10*3/UL (ref 4.4–11.3)

## 2023-12-08 PROCEDURE — 99284 EMERGENCY DEPT VISIT MOD MDM: CPT | Performed by: EMERGENCY MEDICINE

## 2023-12-08 PROCEDURE — 84484 ASSAY OF TROPONIN QUANT: CPT | Performed by: PHYSICIAN ASSISTANT

## 2023-12-08 PROCEDURE — 80053 COMPREHEN METABOLIC PANEL: CPT | Performed by: PHYSICIAN ASSISTANT

## 2023-12-08 PROCEDURE — 85379 FIBRIN DEGRADATION QUANT: CPT | Performed by: PHYSICIAN ASSISTANT

## 2023-12-08 PROCEDURE — 71045 X-RAY EXAM CHEST 1 VIEW: CPT | Performed by: RADIOLOGY

## 2023-12-08 PROCEDURE — 93005 ELECTROCARDIOGRAM TRACING: CPT

## 2023-12-08 PROCEDURE — 36415 COLL VENOUS BLD VENIPUNCTURE: CPT | Performed by: PHYSICIAN ASSISTANT

## 2023-12-08 PROCEDURE — 99285 EMERGENCY DEPT VISIT HI MDM: CPT | Mod: 25

## 2023-12-08 PROCEDURE — 83880 ASSAY OF NATRIURETIC PEPTIDE: CPT | Performed by: PHYSICIAN ASSISTANT

## 2023-12-08 PROCEDURE — 83735 ASSAY OF MAGNESIUM: CPT | Performed by: PHYSICIAN ASSISTANT

## 2023-12-08 PROCEDURE — 71045 X-RAY EXAM CHEST 1 VIEW: CPT | Mod: FY

## 2023-12-08 PROCEDURE — 2550000001 HC RX 255 CONTRASTS: Performed by: EMERGENCY MEDICINE

## 2023-12-08 PROCEDURE — 71275 CT ANGIOGRAPHY CHEST: CPT

## 2023-12-08 PROCEDURE — 85025 COMPLETE CBC W/AUTO DIFF WBC: CPT | Performed by: PHYSICIAN ASSISTANT

## 2023-12-08 PROCEDURE — 71275 CT ANGIOGRAPHY CHEST: CPT | Performed by: RADIOLOGY

## 2023-12-08 RX ADMIN — IOHEXOL 75 ML: 350 INJECTION, SOLUTION INTRAVENOUS at 12:09

## 2023-12-08 ASSESSMENT — LIFESTYLE VARIABLES
EVER HAD A DRINK FIRST THING IN THE MORNING TO STEADY YOUR NERVES TO GET RID OF A HANGOVER: NO
HAVE YOU EVER FELT YOU SHOULD CUT DOWN ON YOUR DRINKING: NO
REASON UNABLE TO ASSESS: NO
HAVE PEOPLE ANNOYED YOU BY CRITICIZING YOUR DRINKING: NO
EVER FELT BAD OR GUILTY ABOUT YOUR DRINKING: NO

## 2023-12-08 ASSESSMENT — PAIN - FUNCTIONAL ASSESSMENT: PAIN_FUNCTIONAL_ASSESSMENT: 0-10

## 2023-12-08 ASSESSMENT — COLUMBIA-SUICIDE SEVERITY RATING SCALE - C-SSRS
1. IN THE PAST MONTH, HAVE YOU WISHED YOU WERE DEAD OR WISHED YOU COULD GO TO SLEEP AND NOT WAKE UP?: NO
6. HAVE YOU EVER DONE ANYTHING, STARTED TO DO ANYTHING, OR PREPARED TO DO ANYTHING TO END YOUR LIFE?: NO
2. HAVE YOU ACTUALLY HAD ANY THOUGHTS OF KILLING YOURSELF?: NO

## 2023-12-08 ASSESSMENT — PAIN SCALES - GENERAL: PAINLEVEL_OUTOF10: 0 - NO PAIN

## 2023-12-08 NOTE — ED PROVIDER NOTES
HPI   Chief Complaint   Patient presents with    Chest Injury     Pt states last week he was lifting plywood while on a ladder and started to have chest pain afterwards. Pt reports he was diagnosed with a UTI within the last week and was taking tylenol for the fevers he was having, the day before yesterday stopped taking tylenol and the pain in his chest came back yesterday. Pt states the chest pain is intermittent, feels sharp in nature and is in the left side of his chest.         History provided by:  Patient   used: No      63-year-old male past medical history aortic valve replacement 6 years ago in 2017, carotid artery stenosis, COPD, hyperlipidemia, hypertension presents with left-sided chest pains that started yesterday.  He states that he lifted plywood 1 week ago and strained his chest, but this resolved.  The next day he went to an urgent care with a fever and was treated for a UTI.  He was taking Tylenol for the fevers and stopped taking this yesterday and this is when he noticed the chest pain.  He states that it feels very sharp and only lasts a second and then comes back.  He does not have the pain right now.  He states that he is not treated for high blood pressure or high cholesterol.  He does not smoke.  Denies radiation of pain.  Denies any associated symptoms.  Denies cough, dizziness, headache, vision changes, weakness, n/v, abd or back pain, SOB    ROS negative unless otherwise stated in HPI                    Laura Coma Scale Score: 15                  Patient History   Past Medical History:   Diagnosis Date    Aneurysm of the ascending aorta, without rupture (CMS/[facility]) 04/27/2017    Aneurysm, ascending aorta    Anxiety disorder, unspecified 04/27/2017    Anxiety    Bipolar disorder, unspecified (CMS/[facility]) 03/04/2017    Bipolar 1 disorder    Body mass index (BMI)40.0-44.9, adult 05/20/2020    Body mass index (BMI) of 40.0 to 44.9 in adult    Body mass index (BMI)40.0-44.9,  adult 01/06/2021    Body mass index (BMI) of 40.0 to 44.9 in adult    Encounter for general adult medical examination without abnormal findings 10/07/2020    Encounter for Medicare annual wellness exam    Encounter for screening for malignant neoplasm of prostate 10/07/2020    Prostate cancer screening    Encounter for screening for malignant neoplasm of prostate 10/07/2020    Encounter for prostate cancer screening    Nonrheumatic aortic (valve) stenosis 04/27/2017    Aortic stenosis, moderate    Other obesity due to excess calories     Obesity due to excess calories    Personal history of other diseases of the circulatory system     History of right bundle branch block (RBBB)    Personal history of other diseases of the digestive system     History of irritable bowel syndrome    Personal history of other diseases of the digestive system     History of constipation    Personal history of other endocrine, nutritional and metabolic disease     History of hyperlipidemia, mixed    Personal history of other medical treatment 11/01/2020    History of dental examination    Personal history of other specified conditions     History of shortness of breath    Personal history of other specified conditions 03/31/2021    History of dizziness    Presence of other heart-valve replacement 10/04/2019    Status post combined aortic root and valve replacement using stentless bioprosthetic aortic valve    Pruritus, unspecified 03/31/2021    Itching of ear    Unspecified chronic otitis externa, unspecified ear 11/10/2021    Chronic otitis externa     Past Surgical History:   Procedure Laterality Date    OTHER SURGICAL HISTORY  07/18/2022    Nasal septoplasty    OTHER SURGICAL HISTORY  07/18/2022    Heart valve replacement    TONSILLECTOMY  02/28/2017    Tonsillectomy     Family History   Problem Relation Name Age of Onset    Heart disease Mother      Diabetes Mother      Cancer Mother      Hypertension Father      Heart disease Father       Diabetes Father       Social History     Tobacco Use    Smoking status: Never     Passive exposure: Never    Smokeless tobacco: Never   Substance Use Topics    Alcohol use: Not Currently    Drug use: Never       Physical Exam   ED Triage Vitals   Temp Pulse Resp BP   -- -- -- --      SpO2 Temp src Heart Rate Source Patient Position   -- -- -- --      BP Location FiO2 (%)     -- --       Physical Exam    General: alert, no distress, well nourished, talking in full and complete sentences  Skin: dry, intact, no rashes  Head: atraumatic  Eyes: EOMI, PERRLA, normal conjunctiva  Throat: no stridor, no hot potato voice  Nose: nares patent  Mouth: mucous membranes moist  CV: +S1/S1  Respiratory: non labored breathing, lungs CTA, no wheezing, no retractions  Abd: soft, NT, normal BS, no peritoneal signs  Extremities: FROM X4, strength +5/5, pulses intact, capillary refill intact, radial pulses equal  Neuro: A&Ox3, no focal neurological deficits  Psych: cooperative, appropriate mood      ED Course & MDM   Diagnoses as of 12/08/23 1318   Chest pain, unspecified type     Labs Reviewed   COMPREHENSIVE METABOLIC PANEL - Abnormal       Result Value    Glucose 123 (*)     Sodium 137      Potassium 4.1      Chloride 103      Bicarbonate 26      Anion Gap 12      Urea Nitrogen 15      Creatinine 1.14      eGFR 73      Calcium 9.1      Albumin 4.3      Alkaline Phosphatase 113      Total Protein 7.0      AST 21      Bilirubin, Total 0.3      ALT 30     D-DIMER, NON VTE - Abnormal    D-Dimer Non VTE, Quant (ng/mL FEU) 571 (*)     Narrative:     The D-Dimer assay is reported in ng/mL Fibrinogen Equivalent Units (FEU). The results of this assay should NOT be used for the exclusion of Deep Vein Thrombosis and/or Pulmonary Embolism.   MAGNESIUM - Normal    Magnesium 2.15     B-TYPE NATRIURETIC PEPTIDE - Normal    BNP 10      Narrative:        <100 pg/mL - Heart failure unlikely  100-299 pg/mL - Intermediate probability of acute  heart                  failure exacerbation. Correlate with clinical                  context and patient history.    >=300 pg/mL - Heart Failure likely. Correlate with clinical                  context and patient history.    BNP testing is performed using different testing methodology at [facility] than at other [facility]. Direct result comparisons should only be made within the same method.      SERIAL TROPONIN-INITIAL - Normal    Troponin I, High Sensitivity 3      Narrative:     Less than 99th percentile of normal range cutoff-  Female and children under 18 years old <14 ng/L; Male <21 ng/L: Negative  Repeat testing should be performed if clinically indicated.     Female and children under 18 years old 14-50 ng/L; Male 21-50 ng/L:  Consistent with possible cardiac damage and possible increased clinical   risk. Serial measurements may help to assess extent of myocardial damage.     >50 ng/L: Consistent with cardiac damage, increased clinical risk and  myocardial infarction. Serial measurements may help assess extent of   myocardial damage.      NOTE: Children less than 1 year old may have higher baseline troponin   levels and results should be interpreted in conjunction with the overall   clinical context.     NOTE: Troponin I testing is performed using a different   testing methodology at [facility] than at other   [facility]. Direct result comparisons should only   be made within the same method.   SERIAL TROPONIN, 1 HOUR - Normal    Troponin I, High Sensitivity <3      Narrative:     Less than 99th percentile of normal range cutoff-  Female and children under 18 years old <14 ng/L; Male <21 ng/L: Negative  Repeat testing should be performed if clinically indicated.     Female and children under 18 years old 14-50 ng/L; Male 21-50 ng/L:  Consistent with possible cardiac damage and possible increased clinical   risk. Serial measurements may help to assess extent of  myocardial damage.     >50 ng/L: Consistent with cardiac damage, increased clinical risk and  myocardial infarction. Serial measurements may help assess extent of   myocardial damage.      NOTE: Children less than 1 year old may have higher baseline troponin   levels and results should be interpreted in conjunction with the overall   clinical context.     NOTE: Troponin I testing is performed using a different   testing methodology at [facility] than at other   [facility]. Direct result comparisons should only   be made within the same method.   TROPONIN SERIES- (INITIAL, 1 HR)    Narrative:     The following orders were created for panel order Troponin I Series, High Sensitivity (0, 1 HR).  Procedure                               Abnormality         Status                     ---------                               -----------         ------                     Troponin I, High Sensiti...[[id number]]  Normal              Final result               Troponin, High Sensitivi...[[id number]]  Normal              Final result                 Please view results for these tests on the individual orders.   CBC WITH AUTO DIFFERENTIAL    WBC 6.5      nRBC 0.0      RBC 4.68      Hemoglobin 15.1      Hematocrit 43.1      MCV 92      MCH 32.3      MCHC 35.0      RDW 12.0      Platelets 293      Neutrophils % 64.8      Immature Granulocytes %, Automated 0.8      Lymphocytes % 21.7      Monocytes % 7.6      Eosinophils % 4.3      Basophils % 0.8      Neutrophils Absolute 4.19      Immature Granulocytes Absolute, Automated 0.05      Lymphocytes Absolute 1.40      Monocytes Absolute 0.49      Eosinophils Absolute 0.28      Basophils Absolute 0.05        CT angio chest for pulmonary embolism   Final Result   1.  No evidence of pulmonary embolism.   2. Granulomatous calcifications.   3. Nonspecific mild patchy ground-glass opacities throughout both   lungs with mild predominantly dependent atelectasis.                   MACRO:   None.        Signed by: Jerardo Dejan 12/8/2023 12:39 PM   Dictation workstation:   [id number]      XR chest 1 view   Final Result   1.  Mild interstitial prominence greater toward the lung bases may be   chronic or represent mild interstitial edema. No localized   consolidation.                  MACRO:   None.        Signed by: Jerardo Dejan 12/8/2023 11:35 AM   Dictation workstation:   [id number]            Medical Decision Making  EKG MY INTERPRETATION NSR at a rate of 79 with no ST elevations.  He does not have a chest pain at the moment and will not be given nitro.  Heart score 3. D-dimer 571.  No significant abnormalities.  No acute findings on final read of chest x-ray.  Final read of CT PE study negative for PE, but does show groundglass opacities in the lower lobes of the lungs.  Delta troponin remains at 3.  No clear cause for his chest pain and possibly viral with the groundglass opacities and he is follow-up family doctor.  Afebrile, not tachycardic, not tachypneic, not hypoxic, tolerating PO, non toxic appearing and ambulating at baseline at discharge. Hemodynamically intact. All questions answered. Patient in agreement with treatment.              Shared MEG Attestation:    This patient was seen by the advanced practice provider.  I have personally performed a substantive portion of the encounter including the physical exam.  My assessment reveals: Regular rate and rhythm cardiac exam with clear breath sounds bilaterally.  Abdomen is soft and nontender.  Neurological exam is grossly intact.  Negative Homans' sign bilaterally.    I have seen and examined the patient, agree with the workup, evaluation, medical decision making, management and diagnosis.  The care plan has been discussed.     Vernon Rojas MD     Procedure  Procedures     María Elena Jara PA-C  12/08/23 1310       María Elena Jara PA-C  12/08/23 1311

## 2023-12-18 ENCOUNTER — APPOINTMENT (OUTPATIENT)
Dept: RADIOLOGY | Facility: HOSPITAL | Age: 63
End: 2023-12-18
Payer: MEDICARE

## 2023-12-18 ENCOUNTER — APPOINTMENT (OUTPATIENT)
Dept: CARDIOLOGY | Facility: HOSPITAL | Age: 63
End: 2023-12-18
Payer: MEDICARE

## 2023-12-18 ENCOUNTER — HOSPITAL ENCOUNTER (EMERGENCY)
Facility: HOSPITAL | Age: 63
Discharge: HOME | End: 2023-12-19
Attending: EMERGENCY MEDICINE
Payer: MEDICARE

## 2023-12-18 DIAGNOSIS — R07.89 ATYPICAL CHEST PAIN: ICD-10-CM

## 2023-12-18 DIAGNOSIS — R05.4 COUGH SYNCOPE: ICD-10-CM

## 2023-12-18 DIAGNOSIS — J10.1 INFLUENZA A: Primary | ICD-10-CM

## 2023-12-18 DIAGNOSIS — R55 COUGH SYNCOPE: ICD-10-CM

## 2023-12-18 LAB
ANION GAP SERPL CALC-SCNC: 12 MMOL/L (ref 10–20)
BASOPHILS # BLD AUTO: 0.04 X10*3/UL (ref 0–0.1)
BASOPHILS NFR BLD AUTO: 0.5 %
BNP SERPL-MCNC: 118 PG/ML (ref 0–99)
BUN SERPL-MCNC: 16 MG/DL (ref 6–23)
CALCIUM SERPL-MCNC: 8.8 MG/DL (ref 8.6–10.3)
CARDIAC TROPONIN I PNL SERPL HS: 4 NG/L (ref 0–20)
CHLORIDE SERPL-SCNC: 102 MMOL/L (ref 98–107)
CO2 SERPL-SCNC: 25 MMOL/L (ref 21–32)
CREAT SERPL-MCNC: 0.99 MG/DL (ref 0.5–1.3)
EOSINOPHIL # BLD AUTO: 0.12 X10*3/UL (ref 0–0.7)
EOSINOPHIL NFR BLD AUTO: 1.6 %
ERYTHROCYTE [DISTWIDTH] IN BLOOD BY AUTOMATED COUNT: 12.1 % (ref 11.5–14.5)
GFR SERPL CREATININE-BSD FRML MDRD: 86 ML/MIN/1.73M*2
GLUCOSE SERPL-MCNC: 117 MG/DL (ref 74–99)
HCT VFR BLD AUTO: 39.9 % (ref 41–52)
HGB BLD-MCNC: 13.9 G/DL (ref 13.5–17.5)
IMM GRANULOCYTES # BLD AUTO: 0.02 X10*3/UL (ref 0–0.7)
IMM GRANULOCYTES NFR BLD AUTO: 0.3 % (ref 0–0.9)
LACTATE SERPL-SCNC: 0.8 MMOL/L (ref 0.4–2)
LYMPHOCYTES # BLD AUTO: 0.6 X10*3/UL (ref 1.2–4.8)
LYMPHOCYTES NFR BLD AUTO: 7.9 %
MAGNESIUM SERPL-MCNC: 1.86 MG/DL (ref 1.6–2.4)
MCH RBC QN AUTO: 32 PG (ref 26–34)
MCHC RBC AUTO-ENTMCNC: 34.8 G/DL (ref 32–36)
MCV RBC AUTO: 92 FL (ref 80–100)
MONOCYTES # BLD AUTO: 1.05 X10*3/UL (ref 0.1–1)
MONOCYTES NFR BLD AUTO: 13.8 %
NEUTROPHILS # BLD AUTO: 5.8 X10*3/UL (ref 1.2–7.7)
NEUTROPHILS NFR BLD AUTO: 75.9 %
NRBC BLD-RTO: 0 /100 WBCS (ref 0–0)
PHOSPHATE SERPL-MCNC: 3.4 MG/DL (ref 2.5–4.9)
PLATELET # BLD AUTO: 266 X10*3/UL (ref 150–450)
POTASSIUM SERPL-SCNC: 3.8 MMOL/L (ref 3.5–5.3)
RBC # BLD AUTO: 4.34 X10*6/UL (ref 4.5–5.9)
SARS-COV-2 RNA RESP QL NAA+PROBE: NOT DETECTED
SODIUM SERPL-SCNC: 135 MMOL/L (ref 136–145)
WBC # BLD AUTO: 7.6 X10*3/UL (ref 4.4–11.3)

## 2023-12-18 PROCEDURE — 99285 EMERGENCY DEPT VISIT HI MDM: CPT | Performed by: EMERGENCY MEDICINE

## 2023-12-18 PROCEDURE — 83735 ASSAY OF MAGNESIUM: CPT | Performed by: EMERGENCY MEDICINE

## 2023-12-18 PROCEDURE — 96361 HYDRATE IV INFUSION ADD-ON: CPT

## 2023-12-18 PROCEDURE — 2500000002 HC RX 250 W HCPCS SELF ADMINISTERED DRUGS (ALT 637 FOR MEDICARE OP, ALT 636 FOR OP/ED): Performed by: EMERGENCY MEDICINE

## 2023-12-18 PROCEDURE — 83605 ASSAY OF LACTIC ACID: CPT | Performed by: EMERGENCY MEDICINE

## 2023-12-18 PROCEDURE — 71275 CT ANGIOGRAPHY CHEST: CPT

## 2023-12-18 PROCEDURE — 84100 ASSAY OF PHOSPHORUS: CPT | Performed by: EMERGENCY MEDICINE

## 2023-12-18 PROCEDURE — 2500000001 HC RX 250 WO HCPCS SELF ADMINISTERED DRUGS (ALT 637 FOR MEDICARE OP): Performed by: EMERGENCY MEDICINE

## 2023-12-18 PROCEDURE — 84484 ASSAY OF TROPONIN QUANT: CPT | Performed by: EMERGENCY MEDICINE

## 2023-12-18 PROCEDURE — 85025 COMPLETE CBC W/AUTO DIFF WBC: CPT | Performed by: EMERGENCY MEDICINE

## 2023-12-18 PROCEDURE — 2500000004 HC RX 250 GENERAL PHARMACY W/ HCPCS (ALT 636 FOR OP/ED): Performed by: EMERGENCY MEDICINE

## 2023-12-18 PROCEDURE — 2550000001 HC RX 255 CONTRASTS: Performed by: EMERGENCY MEDICINE

## 2023-12-18 PROCEDURE — 80048 BASIC METABOLIC PNL TOTAL CA: CPT | Performed by: EMERGENCY MEDICINE

## 2023-12-18 PROCEDURE — 93005 ELECTROCARDIOGRAM TRACING: CPT

## 2023-12-18 PROCEDURE — 36415 COLL VENOUS BLD VENIPUNCTURE: CPT | Performed by: EMERGENCY MEDICINE

## 2023-12-18 PROCEDURE — 94640 AIRWAY INHALATION TREATMENT: CPT

## 2023-12-18 PROCEDURE — 96374 THER/PROPH/DIAG INJ IV PUSH: CPT

## 2023-12-18 PROCEDURE — 87635 SARS-COV-2 COVID-19 AMP PRB: CPT | Performed by: EMERGENCY MEDICINE

## 2023-12-18 PROCEDURE — 71275 CT ANGIOGRAPHY CHEST: CPT | Mod: FOREIGN READ | Performed by: RADIOLOGY

## 2023-12-18 PROCEDURE — 83880 ASSAY OF NATRIURETIC PEPTIDE: CPT | Performed by: EMERGENCY MEDICINE

## 2023-12-18 PROCEDURE — 70450 CT HEAD/BRAIN W/O DYE: CPT | Performed by: RADIOLOGY

## 2023-12-18 PROCEDURE — 70450 CT HEAD/BRAIN W/O DYE: CPT

## 2023-12-18 RX ORDER — IPRATROPIUM BROMIDE AND ALBUTEROL SULFATE 2.5; .5 MG/3ML; MG/3ML
3 SOLUTION RESPIRATORY (INHALATION) ONCE
Status: COMPLETED | OUTPATIENT
Start: 2023-12-18 | End: 2023-12-18

## 2023-12-18 RX ORDER — HYDROCODONE BITARTRATE AND HOMATROPINE METHYLBROMIDE ORAL SOLUTION 5; 1.5 MG/5ML; MG/5ML
5 LIQUID ORAL EVERY 6 HOURS PRN
Status: DISCONTINUED | OUTPATIENT
Start: 2023-12-18 | End: 2023-12-19 | Stop reason: HOSPADM

## 2023-12-18 RX ADMIN — SODIUM CHLORIDE 1000 ML: 9 INJECTION, SOLUTION INTRAVENOUS at 22:44

## 2023-12-18 RX ADMIN — IPRATROPIUM BROMIDE AND ALBUTEROL SULFATE 3 ML: 2.5; .5 SOLUTION RESPIRATORY (INHALATION) at 23:55

## 2023-12-18 RX ADMIN — IOHEXOL 75 ML: 350 INJECTION, SOLUTION INTRAVENOUS at 23:29

## 2023-12-18 RX ADMIN — METHYLPREDNISOLONE SODIUM SUCCINATE 125 MG: 125 INJECTION, POWDER, FOR SOLUTION INTRAMUSCULAR; INTRAVENOUS at 23:53

## 2023-12-18 RX ADMIN — HYDROCODONE BITARTRATE AND HOMATROPINE METHYLBROMIDE 5 ML: 5; 1.5 SOLUTION ORAL at 23:53

## 2023-12-18 ASSESSMENT — LIFESTYLE VARIABLES
EVER FELT BAD OR GUILTY ABOUT YOUR DRINKING: NO
REASON UNABLE TO ASSESS: NO
HAVE YOU EVER FELT YOU SHOULD CUT DOWN ON YOUR DRINKING: NO
HAVE PEOPLE ANNOYED YOU BY CRITICIZING YOUR DRINKING: NO
EVER HAD A DRINK FIRST THING IN THE MORNING TO STEADY YOUR NERVES TO GET RID OF A HANGOVER: NO

## 2023-12-18 ASSESSMENT — PAIN - FUNCTIONAL ASSESSMENT: PAIN_FUNCTIONAL_ASSESSMENT: 0-10

## 2023-12-18 ASSESSMENT — COLUMBIA-SUICIDE SEVERITY RATING SCALE - C-SSRS
2. HAVE YOU ACTUALLY HAD ANY THOUGHTS OF KILLING YOURSELF?: NO
1. IN THE PAST MONTH, HAVE YOU WISHED YOU WERE DEAD OR WISHED YOU COULD GO TO SLEEP AND NOT WAKE UP?: NO
6. HAVE YOU EVER DONE ANYTHING, STARTED TO DO ANYTHING, OR PREPARED TO DO ANYTHING TO END YOUR LIFE?: NO

## 2023-12-18 ASSESSMENT — PAIN SCALES - GENERAL: PAINLEVEL_OUTOF10: 0 - NO PAIN

## 2023-12-19 VITALS
OXYGEN SATURATION: 98 % | TEMPERATURE: 99.3 F | RESPIRATION RATE: 17 BRPM | HEART RATE: 90 BPM | DIASTOLIC BLOOD PRESSURE: 75 MMHG | HEIGHT: 73 IN | WEIGHT: 315 LBS | BODY MASS INDEX: 41.75 KG/M2 | SYSTOLIC BLOOD PRESSURE: 175 MMHG

## 2023-12-19 LAB
FLUAV RNA RESP QL NAA+PROBE: DETECTED
FLUBV RNA RESP QL NAA+PROBE: NOT DETECTED
HOLD SPECIMEN: NORMAL

## 2023-12-19 PROCEDURE — 2500000004 HC RX 250 GENERAL PHARMACY W/ HCPCS (ALT 636 FOR OP/ED): Performed by: EMERGENCY MEDICINE

## 2023-12-19 RX ORDER — OSELTAMIVIR PHOSPHATE 75 MG/1
75 CAPSULE ORAL ONCE
Status: COMPLETED | OUTPATIENT
Start: 2023-12-19 | End: 2023-12-19

## 2023-12-19 RX ORDER — METHYLPREDNISOLONE 4 MG/1
TABLET ORAL
Qty: 21 TABLET | Refills: 0 | Status: SHIPPED | OUTPATIENT
Start: 2023-12-19 | End: 2023-12-26

## 2023-12-19 RX ORDER — HYDROCODONE BITARTRATE AND HOMATROPINE METHYLBROMIDE ORAL SOLUTION 5; 1.5 MG/5ML; MG/5ML
5 LIQUID ORAL EVERY 6 HOURS PRN
Qty: 100 ML | Refills: 0 | Status: SHIPPED | OUTPATIENT
Start: 2023-12-19 | End: 2023-12-24

## 2023-12-19 RX ORDER — ALBUTEROL SULFATE 90 UG/1
1-2 AEROSOL, METERED RESPIRATORY (INHALATION) EVERY 6 HOURS PRN
Qty: 18 G | Refills: 0 | Status: SHIPPED | OUTPATIENT
Start: 2023-12-19 | End: 2024-06-09 | Stop reason: WASHOUT

## 2023-12-19 RX ORDER — OSELTAMIVIR PHOSPHATE 75 MG/1
75 CAPSULE ORAL EVERY 12 HOURS
Qty: 10 CAPSULE | Refills: 0 | Status: SHIPPED | OUTPATIENT
Start: 2023-12-19 | End: 2023-12-24

## 2023-12-19 RX ADMIN — OSELTAMIVIR PHOSPHATE 75 MG: 75 CAPSULE ORAL at 01:41

## 2023-12-19 ASSESSMENT — PAIN - FUNCTIONAL ASSESSMENT: PAIN_FUNCTIONAL_ASSESSMENT: 0-10

## 2023-12-19 ASSESSMENT — PAIN SCALES - GENERAL: PAINLEVEL_OUTOF10: 0 - NO PAIN

## 2023-12-19 NOTE — ED PROVIDER NOTES
HPI   Chief Complaint   Patient presents with    Coughing Up Blood     syncope    Syncope       Chief complaint cough fever syncope  History of present illness 63-year-old male who presents here with flulike symptoms for a day and a half today coughed up some blood and also had a syncopal episode.  Is here for assessment with a blood pressure 178/89 pulse 101 respiration 20 O2 saturation 96% temp 36 8 patient had a low-grade fever around 4 PM, and took Tylenol for this                          No data recorded                Patient History   Past Medical History:   Diagnosis Date    Aneurysm of the ascending aorta, without rupture (CMS/Formerly [facility]) 04/27/2017    Aneurysm, ascending aorta    Anxiety disorder, unspecified 04/27/2017    Anxiety    Bipolar disorder, unspecified (CMS/Formerly [facility]) 03/04/2017    Bipolar 1 disorder    Body mass index (BMI)40.0-44.9, adult 05/20/2020    Body mass index (BMI) of 40.0 to 44.9 in adult    Body mass index (BMI)40.0-44.9, adult 01/06/2021    Body mass index (BMI) of 40.0 to 44.9 in adult    Encounter for general adult medical examination without abnormal findings 10/07/2020    Encounter for Medicare annual wellness exam    Encounter for screening for malignant neoplasm of prostate 10/07/2020    Prostate cancer screening    Encounter for screening for malignant neoplasm of prostate 10/07/2020    Encounter for prostate cancer screening    Nonrheumatic aortic (valve) stenosis 04/27/2017    Aortic stenosis, moderate    Other obesity due to excess calories     Obesity due to excess calories    Personal history of other diseases of the circulatory system     History of right bundle branch block (RBBB)    Personal history of other diseases of the digestive system     History of irritable bowel syndrome    Personal history of other diseases of the digestive system     History of constipation    Personal history of other endocrine, nutritional and metabolic disease     History of hyperlipidemia, mixed     Personal history of other medical treatment 11/01/2020    History of dental examination    Personal history of other specified conditions     History of shortness of breath    Personal history of other specified conditions 03/31/2021    History of dizziness    Presence of other heart-valve replacement 10/04/2019    Status post combined aortic root and valve replacement using stentless bioprosthetic aortic valve    Pruritus, unspecified 03/31/2021    Itching of ear    Unspecified chronic otitis externa, unspecified ear 11/10/2021    Chronic otitis externa     Past Surgical History:   Procedure Laterality Date    OTHER SURGICAL HISTORY  07/18/2022    Nasal septoplasty    OTHER SURGICAL HISTORY  07/18/2022    Heart valve replacement    TONSILLECTOMY  02/28/2017    Tonsillectomy     Family History   Problem Relation Name Age of Onset    Heart disease Mother      Diabetes Mother      Cancer Mother      Hypertension Father      Heart disease Father      Diabetes Father       Social History     Tobacco Use    Smoking status: Never     Passive exposure: Never    Smokeless tobacco: Never   Substance Use Topics    Alcohol use: Not Currently    Drug use: Never       Physical Exam   ED Triage Vitals [12/18/23 2201]   Temp Heart Rate Resp BP   36.8 °C (98.2 °F) 101 20 178/89      SpO2 Temp Source Heart Rate Source Patient Position   96 % Temporal -- --      BP Location FiO2 (%)     Right arm --       Physical Exam  Vitals and nursing note reviewed. Exam conducted with a chaperone present.   Constitutional:       Appearance: He is ill-appearing.   HENT:      Head: Normocephalic and atraumatic.      Right Ear: Tympanic membrane normal.      Left Ear: Tympanic membrane normal.      Nose: Rhinorrhea present.      Mouth/Throat:      Pharynx: Posterior oropharyngeal erythema present.   Cardiovascular:      Rate and Rhythm: Normal rate and regular rhythm.   Pulmonary:      Breath sounds: No wheezing.   Abdominal:      General:  Abdomen is flat.   Skin:     Capillary Refill: Capillary refill takes 2 to 3 seconds.      Coloration: Skin is not jaundiced.   Neurological:      Mental Status: He is alert.         ED Course & MDM   Diagnoses as of 12/19/23 0132   Influenza A   Cough syncope   Atypical chest pain       Medical Decision Making  Differential diagnosis considered  #1 arrhythmia  #2 volume depletion secondary to viral syndrome  #3 intracranial hemorrhage  #4 pulmonary embolus   #5 aortic aneurysm rupture  #6 COVID  #7 influenza  #8 acute coronary syndrome    Considering the above differential diagnosis following tests and treatments were considered in order with shared decision making      Amount and/or Complexity of Data Reviewed  Labs: ordered. Decision-making details documented in ED Course.     Details: Influenza was positive for influenza A, white count 7 6 hemoglobin 1339 phosphorus 3 4 lactic 1.8 troponin of 4  Radiology: ordered and independent interpretation performed.     Details: CT scan of the head was negative for intracranial bleed CT of the chest was negative for aortic rupture pulmonary embolus or pneumonia  Discussion of management or test interpretation with external provider(s): After IV fluids were given aerosol treatment Tamiflu prednisone the patient was feeling better the results of the test were discussed CT of the chest was negative for PE or pneumonia.  Workup was negative positive influenza workup and patient was discharged home corresponding prescriptions were sent to his pharmacy      Labs Reviewed   CBC WITH AUTO DIFFERENTIAL - Abnormal       Result Value    WBC 7.6      nRBC 0.0      RBC 4.34 (*)     Hemoglobin 13.9      Hematocrit 39.9 (*)     MCV 92      MCH 32.0      MCHC 34.8      RDW 12.1      Platelets 266      Neutrophils % 75.9      Immature Granulocytes %, Automated 0.3      Lymphocytes % 7.9      Monocytes % 13.8      Eosinophils % 1.6      Basophils % 0.5      Neutrophils Absolute 5.80       Immature Granulocytes Absolute, Automated 0.02      Lymphocytes Absolute 0.60 (*)     Monocytes Absolute 1.05 (*)     Eosinophils Absolute 0.12      Basophils Absolute 0.04     BASIC METABOLIC PANEL - Abnormal    Glucose 117 (*)     Sodium 135 (*)     Potassium 3.8      Chloride 102      Bicarbonate 25      Anion Gap 12      Urea Nitrogen 16      Creatinine 0.99      eGFR 86      Calcium 8.8     B-TYPE NATRIURETIC PEPTIDE - Abnormal     (*)     Narrative:        <100 pg/mL - Heart failure unlikely  100-299 pg/mL - Intermediate probability of acute heart                  failure exacerbation. Correlate with clinical                  context and patient history.    >=300 pg/mL - Heart Failure likely. Correlate with clinical                  context and patient history.    BNP testing is performed using different testing methodology at [facility] than at other [facility]. Direct result comparisons should only be made within the same method.      INFLUENZA A AND B PCR - Abnormal    Flu A Result Detected (*)     Flu B Result Not Detected      Narrative:     This assay is an in vitro diagnostic multiplex nucleic acid amplification test for the detection and discrimination of Influenza A & B from nasopharyngeal specimens, and has been validated for use at [facility]. Negative results do not preclude Influenza A/B infections, and should not be used as the sole basis for diagnosis, treatment, or other management decisions. If Influenza A/B and RSV PCR results are negative, testing for Parainfluenza virus, Adenovirus and Metapneumovirus is routinely performed for [facility] pediatric oncology and intensive care inpatients, and is available on other patients by placing an add-on request.   PHOSPHORUS - Normal    Phosphorus 3.4     MAGNESIUM - Normal    Magnesium 1.86     LACTATE - Normal    Lactate 0.8      Narrative:     Venipuncture immediately after or during the administration of  Metamizole may lead to falsely low results. Testing should be performed immediately  prior to Metamizole dosing.   TROPONIN I, HIGH SENSITIVITY - Normal    Troponin I, High Sensitivity 4      Narrative:     Less than 99th percentile of normal range cutoff-  Female and children under 18 years old <14 ng/L; Male <21 ng/L: Negative  Repeat testing should be performed if clinically indicated.     Female and children under 18 years old 14-50 ng/L; Male 21-50 ng/L:  Consistent with possible cardiac damage and possible increased clinical   risk. Serial measurements may help to assess extent of myocardial damage.     >50 ng/L: Consistent with cardiac damage, increased clinical risk and  myocardial infarction. Serial measurements may help assess extent of   myocardial damage.      NOTE: Children less than 1 year old may have higher baseline troponin   levels and results should be interpreted in conjunction with the overall   clinical context.     NOTE: Troponin I testing is performed using a different   testing methodology at [facility] than at [facility]. Direct result comparisons should only   be made within the same method.   SARS-COV-2 PCR, SYMPTOMATIC - Normal    Coronavirus 2019, PCR Not Detected      Narrative:     This assay has received FDA Emergency Use Authorization (EUA) and is only authorized for the duration of time that circumstances exist to justify the authorization of the emergency use of in vitro diagnostic tests for the detection of SARS-CoV-2 virus and/or diagnosis of COVID-19 infection under section 564(b)(1) of the Act, 21 U.S.C. 360bbb-3(b)(1). This assay is an in vitro diagnostic nucleic acid amplification test for the qualitative detection of SARS-CoV-2 from nasopharyngeal specimens and has been validated for use at [facility]. Negative results do not preclude COVID-19 infections and should not be used as the sole basis for diagnosis, treatment, or  other management decisions.          CT angio chest for pulmonary embolism   Final Result   Suboptimal evaluation of the segmental/subsegmental pulmonary arterial   branches due to some respiratory motion and quantum mottle artifact.    No central pulmonary embolus otherwise identified.   Lungs are clear without lobar consolidation or pulmonary edema.   Signed by Stew Mcarthur MD      CT head wo IV contrast   Final Result   No acute intracranial hemorrhage or mass effect.             MACRO:   None.        Signed by: Paulette Silvestre 12/19/2023 12:19 AM   Dictation workstation:   [id number]           Procedure  ECG 12 lead    Performed by: David Ferrer MD  Authorized by: David Ferrer MD    ECG interpreted by ED Physician in the absence of a cardiologist: yes    Interpretation:     Interpretation: normal    Rate:     ECG rate:  97    ECG rate assessment: normal    Rhythm:     Rhythm: sinus rhythm    QRS:     QRS conduction: RBBB    ST segments:     ST segments:  Normal  T waves:     T waves: normal         David Ferrer MD  12/19/23 0132

## 2024-01-10 LAB
ATRIAL RATE: 97 BPM
P AXIS: 50 DEGREES
P OFFSET: 198 MS
P ONSET: 134 MS
PR INTERVAL: 172 MS
Q ONSET: 220 MS
QRS COUNT: 16 BEATS
QRS DURATION: 120 MS
QT INTERVAL: 376 MS
QTC CALCULATION(BAZETT): 477 MS
QTC FREDERICIA: 441 MS
R AXIS: 84 DEGREES
T AXIS: 76 DEGREES
T OFFSET: 408 MS
VENTRICULAR RATE: 97 BPM

## 2024-01-23 ENCOUNTER — OFFICE VISIT (OUTPATIENT)
Dept: PRIMARY CARE | Facility: CLINIC | Age: 64
End: 2024-01-23
Payer: MEDICARE

## 2024-01-23 ENCOUNTER — LAB (OUTPATIENT)
Dept: LAB | Facility: LAB | Age: 64
End: 2024-01-23
Payer: MEDICARE

## 2024-01-23 ENCOUNTER — HOSPITAL ENCOUNTER (OUTPATIENT)
Dept: RADIOLOGY | Facility: CLINIC | Age: 64
Discharge: HOME | End: 2024-01-23
Payer: MEDICARE

## 2024-01-23 VITALS
WEIGHT: 315 LBS | HEIGHT: 73 IN | HEART RATE: 91 BPM | SYSTOLIC BLOOD PRESSURE: 118 MMHG | DIASTOLIC BLOOD PRESSURE: 82 MMHG | OXYGEN SATURATION: 97 % | BODY MASS INDEX: 41.75 KG/M2 | RESPIRATION RATE: 16 BRPM | TEMPERATURE: 93.3 F

## 2024-01-23 DIAGNOSIS — R20.0 NUMBNESS AND TINGLING OF BOTH FEET: ICD-10-CM

## 2024-01-23 DIAGNOSIS — H90.3 BILATERAL SENSORINEURAL HEARING LOSS: Primary | ICD-10-CM

## 2024-01-23 DIAGNOSIS — I71.21 ANEURYSM OF ASCENDING AORTA WITHOUT RUPTURE (CMS-HCC): ICD-10-CM

## 2024-01-23 DIAGNOSIS — M75.01 ADHESIVE CAPSULITIS OF RIGHT SHOULDER: ICD-10-CM

## 2024-01-23 DIAGNOSIS — R33.9 URINARY RETENTION: ICD-10-CM

## 2024-01-23 DIAGNOSIS — E66.01 CLASS 3 SEVERE OBESITY DUE TO EXCESS CALORIES WITHOUT SERIOUS COMORBIDITY WITH BODY MASS INDEX (BMI) OF 40.0 TO 44.9 IN ADULT (MULTI): ICD-10-CM

## 2024-01-23 DIAGNOSIS — R20.2 NUMBNESS AND TINGLING OF BOTH FEET: ICD-10-CM

## 2024-01-23 DIAGNOSIS — J41.8 MIXED SIMPLE AND MUCOPURULENT CHRONIC BRONCHITIS (MULTI): ICD-10-CM

## 2024-01-23 DIAGNOSIS — F33.41 RECURRENT MAJOR DEPRESSIVE DISORDER, IN PARTIAL REMISSION (CMS-HCC): ICD-10-CM

## 2024-01-23 DIAGNOSIS — M25.611 DECREASED RIGHT SHOULDER RANGE OF MOTION: ICD-10-CM

## 2024-01-23 DIAGNOSIS — E78.2 MIXED HYPERLIPIDEMIA: ICD-10-CM

## 2024-01-23 DIAGNOSIS — R53.83 OTHER FATIGUE: ICD-10-CM

## 2024-01-23 DIAGNOSIS — F41.9 ANXIETY: ICD-10-CM

## 2024-01-23 DIAGNOSIS — R73.9 HYPERGLYCEMIA: ICD-10-CM

## 2024-01-23 DIAGNOSIS — F31.62 BIPOLAR DISORDER, CURRENT EPISODE MIXED, MODERATE (MULTI): ICD-10-CM

## 2024-01-23 DIAGNOSIS — Z79.899 MEDICATION MANAGEMENT: ICD-10-CM

## 2024-01-23 DIAGNOSIS — Z12.5 SCREENING FOR PROSTATE CANCER: ICD-10-CM

## 2024-01-23 LAB
ALBUMIN SERPL BCP-MCNC: 4.5 G/DL (ref 3.4–5)
ALP SERPL-CCNC: 93 U/L (ref 33–136)
ALT SERPL W P-5'-P-CCNC: 15 U/L (ref 10–52)
ANION GAP SERPL CALC-SCNC: 13 MMOL/L (ref 10–20)
AST SERPL W P-5'-P-CCNC: 16 U/L (ref 9–39)
BILIRUB SERPL-MCNC: 0.6 MG/DL (ref 0–1.2)
BUN SERPL-MCNC: 18 MG/DL (ref 6–23)
CALCIUM SERPL-MCNC: 9.5 MG/DL (ref 8.6–10.3)
CHLORIDE SERPL-SCNC: 104 MMOL/L (ref 98–107)
CO2 SERPL-SCNC: 28 MMOL/L (ref 21–32)
CREAT SERPL-MCNC: 0.95 MG/DL (ref 0.5–1.3)
EGFRCR SERPLBLD CKD-EPI 2021: 90 ML/MIN/1.73M*2
GLUCOSE SERPL-MCNC: 87 MG/DL (ref 74–99)
POC APPEARANCE, URINE: CLEAR
POC BILIRUBIN, URINE: NEGATIVE
POC BLOOD, URINE: NEGATIVE
POC COLOR, URINE: YELLOW
POC FINGERSTICK BLOOD GLUCOSE: 100 MG/DL (ref 70–100)
POC GLUCOSE, URINE: NEGATIVE MG/DL
POC HEMOGLOBIN A1C: 5.9 % (ref 4.2–6.5)
POC KETONES, URINE: NEGATIVE MG/DL
POC LEUKOCYTES, URINE: ABNORMAL
POC NITRITE,URINE: NEGATIVE
POC PH, URINE: 5.5 PH
POC PROTEIN, URINE: NEGATIVE MG/DL
POC SPECIFIC GRAVITY, URINE: 1.02
POC UROBILINOGEN, URINE: 0.2 EU/DL
POTASSIUM SERPL-SCNC: 4.5 MMOL/L (ref 3.5–5.3)
PROT SERPL-MCNC: 7.3 G/DL (ref 6.4–8.2)
PSA SERPL-MCNC: 3.45 NG/ML
SODIUM SERPL-SCNC: 140 MMOL/L (ref 136–145)
TSH SERPL-ACNC: 1.45 MIU/L (ref 0.44–3.98)

## 2024-01-23 PROCEDURE — 80346 BENZODIAZEPINES1-12: CPT

## 2024-01-23 PROCEDURE — 80307 DRUG TEST PRSMV CHEM ANLYZR: CPT

## 2024-01-23 PROCEDURE — 87086 URINE CULTURE/COLONY COUNT: CPT

## 2024-01-23 PROCEDURE — 80365 DRUG SCREENING OXYCODONE: CPT

## 2024-01-23 PROCEDURE — 80368 SEDATIVE HYPNOTICS: CPT

## 2024-01-23 PROCEDURE — 80358 DRUG SCREENING METHADONE: CPT

## 2024-01-23 PROCEDURE — 80373 DRUG SCREENING TRAMADOL: CPT

## 2024-01-23 PROCEDURE — 82570 ASSAY OF URINE CREATININE: CPT

## 2024-01-23 PROCEDURE — G0103 PSA SCREENING: HCPCS

## 2024-01-23 PROCEDURE — 80361 OPIATES 1 OR MORE: CPT

## 2024-01-23 PROCEDURE — 80053 COMPREHEN METABOLIC PANEL: CPT

## 2024-01-23 PROCEDURE — 83036 HEMOGLOBIN GLYCOSYLATED A1C: CPT | Performed by: FAMILY MEDICINE

## 2024-01-23 PROCEDURE — 82962 GLUCOSE BLOOD TEST: CPT | Performed by: FAMILY MEDICINE

## 2024-01-23 PROCEDURE — 73030 X-RAY EXAM OF SHOULDER: CPT | Mod: RT

## 2024-01-23 PROCEDURE — 81002 URINALYSIS NONAUTO W/O SCOPE: CPT | Performed by: FAMILY MEDICINE

## 2024-01-23 PROCEDURE — 36415 COLL VENOUS BLD VENIPUNCTURE: CPT

## 2024-01-23 PROCEDURE — 80354 DRUG SCREENING FENTANYL: CPT

## 2024-01-23 PROCEDURE — 99214 OFFICE O/P EST MOD 30 MIN: CPT | Performed by: FAMILY MEDICINE

## 2024-01-23 PROCEDURE — 84443 ASSAY THYROID STIM HORMONE: CPT

## 2024-01-23 PROCEDURE — 73030 X-RAY EXAM OF SHOULDER: CPT | Mod: RIGHT SIDE | Performed by: RADIOLOGY

## 2024-01-23 RX ORDER — GABAPENTIN 300 MG/1
CAPSULE ORAL
Qty: 30 CAPSULE | Refills: 1 | Status: SHIPPED | OUTPATIENT
Start: 2024-01-23 | End: 2024-04-21 | Stop reason: WASHOUT

## 2024-01-23 RX ORDER — METOPROLOL SUCCINATE 50 MG/1
50 TABLET, EXTENDED RELEASE ORAL DAILY
COMMUNITY
Start: 2023-12-18

## 2024-01-23 ASSESSMENT — ENCOUNTER SYMPTOMS
NECK STIFFNESS: 0
SHORTNESS OF BREATH: 0
POLYPHAGIA: 0
SINUS PAIN: 0
ACTIVITY CHANGE: 0
SEIZURES: 0
CHEST TIGHTNESS: 0
CONSTITUTIONAL NEGATIVE: 1
FATIGUE: 0
SLEEP DISTURBANCE: 0
SINUS PRESSURE: 0
HEADACHES: 0
FLANK PAIN: 0
BLOOD IN STOOL: 0
POLYDIPSIA: 0
NERVOUS/ANXIOUS: 0
DECREASED CONCENTRATION: 0
STRIDOR: 0
AGITATION: 0
DIZZINESS: 0
COUGH: 0
DYSURIA: 0
DYSPHORIC MOOD: 0
SPEECH DIFFICULTY: 0
CONFUSION: 0
CONSTIPATION: 0
HEMATURIA: 0
ADENOPATHY: 0
ABDOMINAL DISTENTION: 0
PHOTOPHOBIA: 0
RHINORRHEA: 0
NUMBNESS: 1
TROUBLE SWALLOWING: 0
ABDOMINAL PAIN: 0
RECTAL PAIN: 0
MYALGIAS: 0
SORE THROAT: 0
COLOR CHANGE: 0
EYE PAIN: 0
ARTHRALGIAS: 1
FEVER: 0
APPETITE CHANGE: 0
DIARRHEA: 0
PALPITATIONS: 0

## 2024-01-23 NOTE — PROGRESS NOTES
Subjective   Patient ID: Leno Viveros is a 63 y.o. male who presents for Anxiety.    Patient is here follow up on anxiety.    Patient states had a bad flu in the past few weeks.     Patient states having a right shoulder pain. Patient states he can moving his shoulder and it cracking. Patient states he feeling bone to bone in his shoulder.    Patient states having bilateral tingling and numbness in his legs. Patient states wake up at night with the pain until the knee. Patient denies any swelling or redness.     Patient would like have a complete blood work ordered.  Patient would like ha a A1C and urine test done.    Patient would like discuss influenza shot.    Patient denies any other symptoms or concerns today.         Review of Systems   Constitutional: Negative.  Negative for activity change, appetite change, fatigue and fever.   HENT:  Negative for congestion, dental problem, ear discharge, ear pain, mouth sores, rhinorrhea, sinus pressure, sinus pain, sore throat, tinnitus and trouble swallowing.    Eyes:  Negative for photophobia, pain and visual disturbance.   Respiratory:  Negative for cough, chest tightness, shortness of breath and stridor.    Cardiovascular:  Negative for chest pain and palpitations.   Gastrointestinal:  Negative for abdominal distention, abdominal pain, blood in stool, constipation, diarrhea and rectal pain.   Endocrine: Negative for cold intolerance, heat intolerance, polydipsia, polyphagia and polyuria.   Genitourinary:  Negative for dysuria, flank pain, hematuria and urgency.   Musculoskeletal:  Positive for arthralgias. Negative for gait problem, myalgias and neck stiffness.   Skin:  Negative for color change and rash.   Allergic/Immunologic: Negative for environmental allergies and food allergies.   Neurological:  Positive for numbness. Negative for dizziness, seizures, syncope, speech difficulty and headaches.   Hematological:  Negative for adenopathy.  "  Psychiatric/Behavioral:  Negative for agitation, confusion, decreased concentration, dysphoric mood and sleep disturbance. The patient is not nervous/anxious.        Objective   /82 (BP Location: Right arm, Patient Position: Sitting, BP Cuff Size: Large adult)   Pulse 91   Temp 34.1 °C (93.3 °F)   Resp 16   Ht 1.854 m (6' 1\")   Wt 148 kg (326 lb)   SpO2 97%   BMI 43.01 kg/m²     Physical Exam  Vitals reviewed.   Constitutional:       General: He is not in acute distress.     Appearance: Normal appearance. He is obese. He is not ill-appearing or diaphoretic.   HENT:      Head: Normocephalic.      Right Ear: Tympanic membrane and external ear normal.      Left Ear: Tympanic membrane and external ear normal.      Nose: Nose normal. No congestion.      Mouth/Throat:      Pharynx: No posterior oropharyngeal erythema.   Eyes:      General:         Right eye: No discharge.         Left eye: No discharge.      Extraocular Movements: Extraocular movements intact.      Conjunctiva/sclera: Conjunctivae normal.      Pupils: Pupils are equal, round, and reactive to light.   Cardiovascular:      Rate and Rhythm: Normal rate and regular rhythm.      Pulses: Normal pulses.      Heart sounds: Normal heart sounds. No murmur heard.  Pulmonary:      Effort: Pulmonary effort is normal. No respiratory distress.      Breath sounds: Normal breath sounds. No wheezing or rales.   Chest:      Chest wall: No tenderness.   Abdominal:      General: Abdomen is flat. Bowel sounds are normal. There is no distension.      Palpations: There is no mass.      Tenderness: There is no abdominal tenderness. There is no guarding.   Musculoskeletal:         General: Tenderness present. Normal range of motion.      Cervical back: Normal range of motion and neck supple. No tenderness.      Right lower leg: No edema.      Left lower leg: No edema.   Skin:     General: Skin is dry.      Coloration: Skin is not jaundiced.      Findings: No " bruising, erythema or rash.   Neurological:      General: No focal deficit present.      Mental Status: He is alert and oriented to person, place, and time. Mental status is at baseline.      Cranial Nerves: No cranial nerve deficit.      Sensory: Sensory deficit present.      Coordination: Coordination normal.      Gait: Gait normal.   Psychiatric:         Mood and Affect: Mood normal.         Thought Content: Thought content normal.         Judgment: Judgment normal.         Assessment/Plan   Problem List Items Addressed This Visit             ICD-10-CM    Ascending aortic aneurysm (CMS/[facility] located [facility]) I71.21    Bilateral sensorineural hearing loss - Primary H90.3    COPD (chronic obstructive pulmonary disease) (CMS/[facility] located [facility]) J44.9    Mixed hyperlipidemia E78.2    Recurrent major depressive disorder, in partial remission (CMS/[facility]) F33.41    Urinary retention R33.9    Relevant Orders    Urine Culture (Completed)    POCT UA (nonautomated) manually resulted (Completed)    Obesity due to excess calories E66.09    Hyperglycemia R73.9    Relevant Orders    POCT glycosylated hemoglobin (Hb A1C) manually resulted (Completed)    POCT Fingerstick Glucose manually resulted (Completed)    Bipolar disorder, current episode mixed, moderate (CMS/[facility]) F31.62     stable          Other Visit Diagnoses         Codes    Anxiety     F41.9    Medication management     Z79.899    Relevant Orders    Opiate/Opioid/Benzo Prescription Compliance (Completed)    OOB Internal Tracking (Completed)    Adhesive capsulitis of right shoulder     M75.01    Relevant Medications    gabapentin (Neurontin) 300 mg capsule    Other Relevant Orders    XR shoulder right 2+ views (Completed)    Referral to Orthopaedic Surgery    Numbness and tingling of both feet     R20.0, R20.2    Relevant Medications    gabapentin (Neurontin) 300 mg capsule    Other Relevant Orders    EMG & nerve conduction    Decreased right shoulder range of motion     M25.611    Relevant Orders     Referral to Orthopaedic Surgery              Scribe Attestation  By signing my name below, I, HARMAN Gaston , Dianne   attest that this documentation has been prepared under the direction and in the presence of Wai Stevenson DO.   Provider Attestation - Scribe documentation    All medical record entries made by the Scribe were at my direction and personally dictated by me. I have reviewed the chart and agree that the record accurately reflects my personal performance of the history, physical exam, discussion and plan.

## 2024-01-23 NOTE — PATIENT INSTRUCTIONS
Follow up in 1 weeks after EMG study     Continue current medications and therapy for chronic medical conditions.    Patient was advised importance of proper diet/nutrition in addition adequate hydration. Patient was encouraged moderate exercise program to include 30 minutes daily for 5 days of the week or 150 minutes weekly. Patient will follow-up with us as scheduled.    I personally reviewed the OARRS report for this patient. I have considered the risks of abuse, dependence, addiction, and diversion.    UDS/CSA: 01/23/2024    Complete EMG study     Right shoulder x-ray done today     Orthopedic surgery referral ordered     Start gabapentin 300 mg 1-2 at bedtime

## 2024-01-24 LAB
AMPHETAMINES UR QL SCN: NORMAL
BACTERIA UR CULT: NO GROWTH
BARBITURATES UR QL SCN: NORMAL
BZE UR QL SCN: NORMAL
CANNABINOIDS UR QL SCN: NORMAL
CREAT UR-MCNC: 143.4 MG/DL (ref 20–370)
PCP UR QL SCN: NORMAL

## 2024-01-27 LAB
1OH-MIDAZOLAM UR CFM-MCNC: <25 NG/ML
6MAM UR CFM-MCNC: <25 NG/ML
7AMINOCLONAZEPAM UR CFM-MCNC: <25 NG/ML
A-OH ALPRAZ UR CFM-MCNC: <25 NG/ML
ALPRAZ UR CFM-MCNC: <25 NG/ML
CHLORDIAZEP UR CFM-MCNC: <25 NG/ML
CLONAZEPAM UR CFM-MCNC: <25 NG/ML
CODEINE UR CFM-MCNC: <50 NG/ML
DIAZEPAM UR CFM-MCNC: <25 NG/ML
EDDP UR CFM-MCNC: <25 NG/ML
FENTANYL UR CFM-MCNC: <2.5 NG/ML
HYDROCODONE CTO UR CFM-MCNC: <25 NG/ML
HYDROMORPHONE UR CFM-MCNC: <25 NG/ML
LORAZEPAM UR CFM-MCNC: 196 NG/ML
METHADONE UR CFM-MCNC: <25 NG/ML
MIDAZOLAM UR CFM-MCNC: <25 NG/ML
MORPHINE UR CFM-MCNC: <50 NG/ML
NORDIAZEPAM UR CFM-MCNC: <25 NG/ML
NORFENTANYL UR CFM-MCNC: <2.5 NG/ML
NORHYDROCODONE UR CFM-MCNC: <25 NG/ML
NOROXYCODONE UR CFM-MCNC: <25 NG/ML
NORTRAMADOL UR-MCNC: <50 NG/ML
OXAZEPAM UR CFM-MCNC: <25 NG/ML
OXYCODONE UR CFM-MCNC: <25 NG/ML
OXYMORPHONE UR CFM-MCNC: <25 NG/ML
TEMAZEPAM UR CFM-MCNC: <25 NG/ML
TRAMADOL UR CFM-MCNC: <50 NG/ML
ZOLPIDEM UR CFM-MCNC: <25 NG/ML
ZOLPIDEM UR-MCNC: <25 NG/ML

## 2024-01-28 PROBLEM — F31.62 BIPOLAR DISORDER, CURRENT EPISODE MIXED, MODERATE (MULTI): Status: ACTIVE | Noted: 2024-01-28

## 2024-01-29 ENCOUNTER — TELEPHONE (OUTPATIENT)
Dept: PRIMARY CARE | Facility: CLINIC | Age: 64
End: 2024-01-29
Payer: MEDICARE

## 2024-01-29 NOTE — TELEPHONE ENCOUNTER
----- Message from Wai Stevenson DO sent at 1/28/2024  9:37 PM EST -----  Please notify patient of normal results.  Thank you

## 2024-02-01 ENCOUNTER — OFFICE VISIT (OUTPATIENT)
Dept: ORTHOPEDIC SURGERY | Facility: CLINIC | Age: 64
End: 2024-02-01
Payer: MEDICARE

## 2024-02-01 VITALS — BODY MASS INDEX: 41.75 KG/M2 | WEIGHT: 315 LBS | HEIGHT: 73 IN

## 2024-02-01 DIAGNOSIS — M75.01 ADHESIVE CAPSULITIS OF RIGHT SHOULDER: ICD-10-CM

## 2024-02-01 DIAGNOSIS — M19.019 GLENOHUMERAL ARTHRITIS: Primary | ICD-10-CM

## 2024-02-01 DIAGNOSIS — M25.611 DECREASED RIGHT SHOULDER RANGE OF MOTION: ICD-10-CM

## 2024-02-01 PROCEDURE — 3008F BODY MASS INDEX DOCD: CPT | Performed by: ORTHOPAEDIC SURGERY

## 2024-02-01 PROCEDURE — 1036F TOBACCO NON-USER: CPT | Performed by: ORTHOPAEDIC SURGERY

## 2024-02-01 PROCEDURE — 99213 OFFICE O/P EST LOW 20 MIN: CPT | Performed by: ORTHOPAEDIC SURGERY

## 2024-02-01 PROCEDURE — 20610 DRAIN/INJ JOINT/BURSA W/O US: CPT | Mod: RT | Performed by: ORTHOPAEDIC SURGERY

## 2024-02-01 PROCEDURE — 2500000004 HC RX 250 GENERAL PHARMACY W/ HCPCS (ALT 636 FOR OP/ED): Performed by: ORTHOPAEDIC SURGERY

## 2024-02-01 PROCEDURE — 2500000005 HC RX 250 GENERAL PHARMACY W/O HCPCS: Performed by: ORTHOPAEDIC SURGERY

## 2024-02-01 RX ORDER — DOCUSATE SODIUM 100 MG/1
CAPSULE, LIQUID FILLED ORAL EVERY 12 HOURS
COMMUNITY

## 2024-02-01 RX ORDER — MECLIZINE HCL 12.5 MG 12.5 MG/1
TABLET ORAL
COMMUNITY
Start: 2019-08-17

## 2024-02-01 RX ORDER — IRON POLYSACCHARIDE COMPLEX 150 MG
CAPSULE ORAL
COMMUNITY

## 2024-02-01 RX ORDER — ESCITALOPRAM OXALATE 20 MG/1
TABLET ORAL
COMMUNITY
Start: 2017-01-24

## 2024-02-01 RX ORDER — LEVALBUTEROL INHALATION SOLUTION 0.63 MG/3ML
SOLUTION RESPIRATORY (INHALATION)
COMMUNITY
Start: 2019-10-04

## 2024-02-01 RX ORDER — ROSUVASTATIN CALCIUM 10 MG/1
TABLET, COATED ORAL
COMMUNITY
Start: 2020-05-20

## 2024-02-01 RX ORDER — ATORVASTATIN CALCIUM 40 MG/1
TABLET, FILM COATED ORAL
COMMUNITY

## 2024-02-01 RX ORDER — NAPROXEN 500 MG/1
TABLET ORAL
COMMUNITY
Start: 2021-01-06

## 2024-02-01 RX ADMIN — BETAMETHASONE SODIUM PHOSPHATE AND BETAMETHASONE ACETATE 2 ML: 3; 3 INJECTION, SUSPENSION INTRA-ARTICULAR; INTRALESIONAL; INTRAMUSCULAR at 13:39

## 2024-02-01 RX ADMIN — LIDOCAINE HYDROCHLORIDE 5 ML: 10 INJECTION, SOLUTION INFILTRATION; PERINEURAL at 13:39

## 2024-02-01 ASSESSMENT — PAIN - FUNCTIONAL ASSESSMENT: PAIN_FUNCTIONAL_ASSESSMENT: 0-10

## 2024-02-01 ASSESSMENT — PAIN SCALES - GENERAL: PAINLEVEL_OUTOF10: 1

## 2024-02-01 NOTE — PROGRESS NOTES
History of present illness: Patient was seen last May shoulder arthritis he had more pain more discomfort last year he did a steroid Dosepak with minimal relief he has been rerefurbishing an old house and now aggravated the shoulder doing a lot of heavy lifting and push pulling    Physical exam:    General: No acute distress or breathing difficulty or discomfort, pleasant and cooperative with the examination.    Extremities: The right shoulder was inspected and was found to have no obvious deformity.  There was tenderness to touch over the lateral edges of the shoulder over the rotator cuff insertion.  Active forward flexion 120 degrees, external rotation to 60 degrees, abduction to 50 degrees, and internal rotation to the level of L2.    At this time the shoulder is neurovascular tact and neurosensory intact.  Motor intact C5-T1.  There was no obvious neck pain or radiculopathy noted.  There was no gross instability or adhesive capsulitis symptoms.  There was no evidence of apprehension or apprehension suppression.    Strength was tested in 4 planes with weakness in the supraspinatus strength testing and external rotation position.  There was no strength deficit in internal rotation.  Impingement signs were positive both supine and standing for impingement test type I and II.  There was mild pain over the bicipital groove with a positive speeds sign    Before aspiration injection the benefits of a cortisone injection including infection, local skin irritation, skin atrophy, calcification, continued pain and discomfort, elevated blood sugar, burning, failure to relieve pain, possible late infection were discussed with the patient.    Postprocedure discomfort can be alleviated with additional medications, ice, elevation, rest over the first 24 hours as recommended.    Patient verbalized understanding and wanted to proceed with the planned procedure.    After informed consent was provided and allergies verified, the  patient was positioned appropriately on thel bed.  The right shoulder to be aspirated and injected was prepped and draped in a sterile fashion.  The skin was anesthetized with ethyl chloride spray.  A joint aspiration was to be performed an 18-gauge needle was used otherwise a 22-gauge needle was used to inject the appropriate joint.    Joint injection was performed with a mixture of 5 cc 1% lidocaine plain and 2 cc Celestone Soluspan 6 mg per mL.  The needle was removed and the puncture site closed and sealed with a Band-Aid.  The patient tolerated the procedure well.    Diagnostic studies: X-rays show advancing osteoarthritic changes right shoulder    Impression: Right shoulder osteoarthritis probable cuff irritation as well possible tearing    Plan: Cortisone injection    He needs to limit how much heavy physical exertion he puts on the shoulder especially overhead    He is becoming quite limited with regard to motion    We offered therapy he declined    I will see him back in 1 week if he does not get response we may order a fluoroscopic intra-articular injection hopefully he can avoid surgery which may include arthroplasty which would have some permanent weight lifting limits which he is wishing to avoid    Also when the patient returns next week AP lateral standing x-rays of both knees will be taken he has some numbness and swelling from his knees to his calves he said he is scheduled for what sounds like EMG testing but we will see him back and take x-rays of both knees and possibly inject the knees next week as well  L Inj/Asp: R subacromial bursa on 2/1/2024 1:39 PM  Indications: pain  Details: 22 G needle, posterior approach  Medications: 2 mL betamethasone acet,sod phos 6 mg/mL; 5 mL lidocaine 10 mg/mL (1 %)  Outcome: tolerated well, no immediate complications  Procedure, treatment alternatives, risks and benefits explained, specific risks discussed. Consent was given by the patient. Immediately prior to  procedure a time out was called to verify the correct patient, procedure, equipment, support staff and site/side marked as required.

## 2024-02-02 RX ORDER — BETAMETHASONE SODIUM PHOSPHATE AND BETAMETHASONE ACETATE 3; 3 MG/ML; MG/ML
2 INJECTION, SUSPENSION INTRA-ARTICULAR; INTRALESIONAL; INTRAMUSCULAR; SOFT TISSUE
Status: COMPLETED | OUTPATIENT
Start: 2024-02-01 | End: 2024-02-01

## 2024-02-02 RX ORDER — LIDOCAINE HYDROCHLORIDE 10 MG/ML
5 INJECTION INFILTRATION; PERINEURAL
Status: COMPLETED | OUTPATIENT
Start: 2024-02-01 | End: 2024-02-01

## 2024-02-08 ENCOUNTER — APPOINTMENT (OUTPATIENT)
Dept: ORTHOPEDIC SURGERY | Facility: CLINIC | Age: 64
End: 2024-02-08
Payer: MEDICARE

## 2024-02-09 DIAGNOSIS — R33.9 RETENTION OF URINE: ICD-10-CM

## 2024-02-09 DIAGNOSIS — N13.8 BENIGN PROSTATIC HYPERPLASIA WITH URINARY OBSTRUCTION: ICD-10-CM

## 2024-02-09 DIAGNOSIS — N40.1 BENIGN PROSTATIC HYPERPLASIA WITH URINARY OBSTRUCTION: ICD-10-CM

## 2024-02-13 ENCOUNTER — LAB (OUTPATIENT)
Dept: LAB | Facility: LAB | Age: 64
End: 2024-02-13
Payer: MEDICARE

## 2024-02-13 ENCOUNTER — HOSPITAL ENCOUNTER (OUTPATIENT)
Dept: RADIOLOGY | Facility: HOSPITAL | Age: 64
Discharge: HOME | End: 2024-02-13
Payer: MEDICARE

## 2024-02-13 DIAGNOSIS — R33.9 RETENTION OF URINE: ICD-10-CM

## 2024-02-13 DIAGNOSIS — N40.1 BENIGN PROSTATIC HYPERPLASIA WITH URINARY OBSTRUCTION: ICD-10-CM

## 2024-02-13 DIAGNOSIS — N13.8 BENIGN PROSTATIC HYPERPLASIA WITH URINARY OBSTRUCTION: ICD-10-CM

## 2024-02-13 LAB
APPEARANCE UR: CLEAR
BILIRUB UR STRIP.AUTO-MCNC: NEGATIVE MG/DL
COLOR UR: YELLOW
GLUCOSE UR STRIP.AUTO-MCNC: NEGATIVE MG/DL
KETONES UR STRIP.AUTO-MCNC: NEGATIVE MG/DL
LEUKOCYTE ESTERASE UR QL STRIP.AUTO: NEGATIVE
NITRITE UR QL STRIP.AUTO: NEGATIVE
PH UR STRIP.AUTO: 6 [PH]
PROT UR STRIP.AUTO-MCNC: NEGATIVE MG/DL
PSA SERPL-MCNC: 2.6 NG/ML
RBC # UR STRIP.AUTO: NEGATIVE /UL
SP GR UR STRIP.AUTO: 1.02
UROBILINOGEN UR STRIP.AUTO-MCNC: <2 MG/DL

## 2024-02-13 PROCEDURE — 36415 COLL VENOUS BLD VENIPUNCTURE: CPT

## 2024-02-13 PROCEDURE — 81003 URINALYSIS AUTO W/O SCOPE: CPT

## 2024-02-13 PROCEDURE — 88112 CYTOPATH CELL ENHANCE TECH: CPT | Performed by: PATHOLOGY

## 2024-02-13 PROCEDURE — 76770 US EXAM ABDO BACK WALL COMP: CPT

## 2024-02-13 PROCEDURE — 76770 US EXAM ABDO BACK WALL COMP: CPT | Performed by: RADIOLOGY

## 2024-02-13 PROCEDURE — 87086 URINE CULTURE/COLONY COUNT: CPT

## 2024-02-13 PROCEDURE — 84153 ASSAY OF PSA TOTAL: CPT

## 2024-02-13 PROCEDURE — 88112 CYTOPATH CELL ENHANCE TECH: CPT

## 2024-02-14 LAB — BACTERIA UR CULT: NO GROWTH

## 2024-02-15 LAB
LABORATORY COMMENT REPORT: NORMAL
LABORATORY COMMENT REPORT: NORMAL
PATH REPORT.FINAL DX SPEC: NORMAL
PATH REPORT.GROSS SPEC: NORMAL
PATH REPORT.RELEVANT HX SPEC: NORMAL
PATH REPORT.TOTAL CANCER: NORMAL

## 2024-02-27 ENCOUNTER — OFFICE VISIT (OUTPATIENT)
Dept: UROLOGY | Facility: CLINIC | Age: 64
End: 2024-02-27
Payer: MEDICARE

## 2024-02-27 VITALS
WEIGHT: 315 LBS | HEIGHT: 73 IN | BODY MASS INDEX: 41.75 KG/M2 | DIASTOLIC BLOOD PRESSURE: 66 MMHG | SYSTOLIC BLOOD PRESSURE: 120 MMHG | RESPIRATION RATE: 18 BRPM | HEART RATE: 86 BPM

## 2024-02-27 DIAGNOSIS — N13.8 BENIGN PROSTATIC HYPERPLASIA WITH URINARY OBSTRUCTION: ICD-10-CM

## 2024-02-27 DIAGNOSIS — R33.9 URINARY RETENTION: ICD-10-CM

## 2024-02-27 DIAGNOSIS — Z12.5 SCREENING PSA (PROSTATE SPECIFIC ANTIGEN): ICD-10-CM

## 2024-02-27 DIAGNOSIS — N40.1 BENIGN PROSTATIC HYPERPLASIA WITH URINARY OBSTRUCTION: ICD-10-CM

## 2024-02-27 DIAGNOSIS — N39.0 URINARY TRACT INFECTION WITHOUT HEMATURIA, SITE UNSPECIFIED: ICD-10-CM

## 2024-02-27 PROCEDURE — 1036F TOBACCO NON-USER: CPT | Performed by: UROLOGY

## 2024-02-27 PROCEDURE — 99214 OFFICE O/P EST MOD 30 MIN: CPT | Performed by: UROLOGY

## 2024-02-27 PROCEDURE — 3008F BODY MASS INDEX DOCD: CPT | Performed by: UROLOGY

## 2024-02-27 ASSESSMENT — ENCOUNTER SYMPTOMS
DIFFICULTY URINATING: 0
HEMATURIA: 0
FREQUENCY: 0
FLANK PAIN: 0
DYSURIA: 0

## 2024-02-27 ASSESSMENT — PAIN SCALES - GENERAL: PAINLEVEL: 0-NO PAIN

## 2024-02-27 NOTE — PATIENT INSTRUCTIONS
It was very nice to meet you today.  We discussed your previous urinary tract issues including a urinary tract infection.  You did find that Flomax was of benefit, however, you have decided to independently discontinue that medication.  Presently, your urinalysis shows no blood, urine culture no growth, and urine cytology was negative for malignant cells.  Your PSA is normal at 2.60 and your ultrasound of the kidneys did not show any stones or tumors.  You would like to return in 1 year.     This note was created with voice-recognition software and was not corrected for typographical or grammatical errors.

## 2024-02-27 NOTE — PROGRESS NOTES
"63-year-old white male referred by Dr. Wai Stevenson for \"retention, BPH with obstruction\".  Last seen by Dr. Hector Martinez, urologist, on Monika 15, 2023.  BMI 42.7.  Presently on Flomax.  Patient is disabled, secondary to psychiatric issues.  Previously employed in the beverage distribution industry.  No family history of prostate or breast cancer.  Very minimal cigarette smoking history.    February 27, 2024, patient arrives alone.  He states that he has no urinary complaints.  He independently discontinued Flomax.  Urine cytology was negative for malignant cells.  Urine culture no growth.  Urinalysis was negative for blood.  PSA is normal at 2.60.  Renal ultrasound was \"unremarkable\".  He will return in 1 year.    PLAN:    1.  Patient will return in March or 2025 with flow rate, PVR, urinalysis, urine culture and PSA.    2.  If incomplete emptying, retention or recurrent UTI, then strongly suggest restarting Flomax    Physical Exam  Vitals and nursing note reviewed.   Constitutional:       Appearance: Normal appearance.   HENT:      Head: Normocephalic and atraumatic.   Pulmonary:      Effort: Pulmonary effort is normal.   Abdominal:      Palpations: Abdomen is soft.      Tenderness: There is no abdominal tenderness.   Musculoskeletal:         General: Normal range of motion.      Cervical back: Normal range of motion and neck supple.   Neurological:      General: No focal deficit present.      Mental Status: He is alert and oriented to person, place, and time.   Psychiatric:         Mood and Affect: Mood normal.         Behavior: Behavior normal.        This note was created with voice-recognition software and was not corrected for typographical or grammatical errors.  "

## 2024-02-27 NOTE — PROGRESS NOTES
Patient denies any pain today. Patient has not had any recent surgeries or hospital admits. Patient denies any concerns about falling or safety. Patient has hesitancy, nocturia x2. Patient believes he only had urinary symptoms because of a bladder infection November 2023. CV     Review of Systems   Genitourinary:  Positive for enuresis. Negative for decreased urine volume, difficulty urinating, dysuria, flank pain, frequency, hematuria, penile pain, penile swelling, scrotal swelling, testicular pain and urgency.   All other systems reviewed and are negative.

## 2024-02-27 NOTE — LETTER
"February 27, 2024     Wai Stevenson DO  [address] [facility] [place] [address]    Patient: Leno Viveros   YOB: 1960   Date of Visit: 2/27/2024       Dear Dr. Wai Stevenson, :    Thank you for referring Leno Viveros to me for evaluation. Below are my notes for this consultation.  If you have questions, please do not hesitate to call me. I look forward to following your patient along with you.       Sincerely,     David Woodard MD      CC: No Recipients  ______________________________________________________________________________________    63-year-old white male referred by Dr. Wai Stevenson for \"retention, BPH with obstruction\".  Last seen by Dr. Hector Martinez, urologist, on Monika 15, 2023.  BMI 42.7.  Presently on Flomax.  Patient is disabled, secondary to psychiatric issues.  Previously employed in the beverage distribution industry.  No family history of prostate or breast cancer.  Very minimal cigarette smoking history.    February 27, 2024, patient arrives alone.  He states that he has no urinary complaints.  He independently discontinued Flomax.  Urine cytology was negative for malignant cells.  Urine culture no growth.  Urinalysis was negative for blood.  PSA is normal at 2.60.  Renal ultrasound was \"unremarkable\".  He will return in 1 year.    PLAN:    1.  Patient will return in March or 2025 with flow rate, PVR, urinalysis, urine culture and PSA.    2.  If incomplete emptying, retention or recurrent UTI, then strongly suggest restarting Flomax    Physical Exam  Vitals and nursing note reviewed.   Constitutional:       Appearance: Normal appearance.   HENT:      Head: Normocephalic and atraumatic.   Pulmonary:      Effort: Pulmonary effort is normal.   Abdominal:      Palpations: Abdomen is soft.      Tenderness: There is no abdominal tenderness.   Musculoskeletal:         General: Normal range of motion.      Cervical back: Normal range of motion and neck supple. "   Neurological:      General: No focal deficit present.      Mental Status: He is alert and oriented to person, place, and time.   Psychiatric:         Mood and Affect: Mood normal.         Behavior: Behavior normal.        This note was created with voice-recognition software and was not corrected for typographical or grammatical errors.

## 2024-03-04 ENCOUNTER — HOSPITAL ENCOUNTER (OUTPATIENT)
Dept: NEUROLOGY | Facility: HOSPITAL | Age: 64
Discharge: HOME | End: 2024-03-04
Payer: MEDICARE

## 2024-03-04 DIAGNOSIS — R20.2 NUMBNESS AND TINGLING OF BOTH FEET: ICD-10-CM

## 2024-03-04 DIAGNOSIS — R20.0 NUMBNESS AND TINGLING OF BOTH FEET: ICD-10-CM

## 2024-03-04 PROCEDURE — 95910 NRV CNDJ TEST 7-8 STUDIES: CPT

## 2024-03-04 NOTE — PROCEDURES
Electromyography Laboratory Report       Accreditation with Exemplary Status       Name DONALD BILLINGS MRN [id number] Ref Provider LATESHA GRACE Technologist Tamara   Gender Male Age 63 Years EDX Physician Capo Sesay MD  Temp ºC 34    1960 Height 6 feet 1 inch Order No [id number] Study Date 3/4/2024 10:01 AM      Reason for Referral:   Other,     DONALD BILLINGS [id number] 3/4/2024 10:01 AM     1 of 1      Motor Nerve Conduction Study       Nerve/Sites Muscle Amplitude Latency Velocity F min     mV ms m/s ms     Right Left Ref. Right Left Ref. Right Left Ref. Right Left Ref.   Peroneal - EDB      Ankle EDB 4.3 3.5 >=2.5 6.0 6.9 <=6.0    52.9 56.4 <=63.6      B. Fibula EDB 4.5 3.9  13.5 14.7  43.8 41.1 >=40.0         A. Fibula EDB               Tibial - AH      Ankle AH 5.2 4.0 >=4.0 5.7 5.5 <=6.0    55.7 61.3 <=68.5      Pop Fossa AH 4.7 3.8  14.7 14.9  44.2 41.3 >=40.0          Sensory Nerve Conduction Study       Nerve/Sites Rec. Site Amplitude Peak Lat Velocity     µV ms m/s     Right Left Ref. Right Left Ref. Right Left Ref.   Sural      Post Calf Post Ankle 15.3 6.1 >=3.0 3.8 4.3 <=4.6 48.9 41.4 >=40.0   Superficial Peroneal      Lat Leg Ankle NR NR >=3.0 NR NR <=4.6 NR NR >=40.0           EMG Summary Table     Spontaneous Voluntary Activity   Muscle Nerve Roots Ins Act Fibs/PSW Fasc Other Amp Dur Phases Recruitment Activation Notes   L. EDB - Ext Dig Brev Tibial L5-S1 NL 0 0 - NL NL NL MO NL -   L. TA - Tib Ant Dp peroneal (Fibular) L4-L5 NL 0 0 - NL NL NL NL NL -   L. PL - Per Long Peroneal L5-S1 NL 0 0 - NL NL NL NL NL -   L. AHB - Abd Llamas Brev Tibial S1-S2 NL 0 0 - NL +1 NL SL NL -   L. MG - Med Gastroc Tibial S1-S2 NL 0 0 - NL NL NL NL NL -   L. VL - Vast Lat Femoral L2-L4 NL 0 0 - NL NL NL NL NL -   L. Lumbar psp (low)  - NL 0 0 - NL NL NL NL NL -   L. Lumbar psp (mid)  - NL 0 0 - NL NL NL NL NL -   L. Lumbar psp (up)  - NL +1 0 - NL NL NL NL NL -   R. EDB - Ext Dig Brev Tibial L5-S1 NL +3 0 - -1  NL N/+1 MO NL -   R. TA - Tib Ant Dp peroneal (Fibular) L4-L5 NL 0 0 - NL +2 N/+1 SL NL -   R. PL - Per Long Peroneal L5-S1 NL 0 0 - NL +1 NL NL NL -   R. AHB - Abd Llamas Brev Tibial S1-S2 Incr 0 0 - NL +2 NL SL NL -   R. MG - Med Gastroc Tibial S1-S2 NL 0 0 - NL +1 N/+1 NL NL -   R. VL - Vast Lat Femoral L2-L4 NL 0 0 - NL NL NL NL NL -   R. Lumbar psp (low)  - NL 0 0 - NL NL NL NL NL -   R. Lumbar psp (mid)  - NL +1 0 Cramp NL NL NL NL NL -   R. Lumbar psp (up)  - NL 0 0 - NL NL NL NL NL -             NL = Normal, Inc = Increased, Dec = Decreased, CRD = Complex Repetitive Discharge; SL = Slightly Decreased; MO = Moderately Decreased; MK = Markedly Decreased; N/+1, +1, +2, +3 = Borderline, Slightly, Moderately, Markedly Increased; N/-1, -1, -2, -3 = Borderline, Slightly, Moderately, Markedly Decreased, N/A = Not Applicable    Summary:     Impression:   This study reveals ENMG evidence of an acute/ongoing, neuropathic, axonal process affecting the L L4 nerve root, most likely, of moderate degree by electrical criteria. A similar process affects the L L1 or L2 nerve root. Due to the overlap of radicular territories within the paraspinal musculature, the actual lesion may in fact be one level cephalad or caudad.   There may be a separate process affecting the R deep peroneal nerve at the ankle, given the significantly more severe findings in the R EDB muscle. This may be due to pressure upon this nerve from frequent ankle-crossing.

## 2024-04-05 DIAGNOSIS — F41.1 GENERALIZED ANXIETY DISORDER: ICD-10-CM

## 2024-04-05 DIAGNOSIS — F33.41 RECURRENT MAJOR DEPRESSIVE DISORDER, IN PARTIAL REMISSION (CMS-HCC): ICD-10-CM

## 2024-04-05 NOTE — TELEPHONE ENCOUNTER
Dr. Stevenson patient  Refill for LORazepam (Ativan) 1 mg tablet  [facility] in [place] on [address]

## 2024-04-07 RX ORDER — LORAZEPAM 1 MG/1
1 TABLET ORAL EVERY 6 HOURS PRN
Qty: 30 TABLET | Refills: 0 | Status: SHIPPED | OUTPATIENT
Start: 2024-04-07 | End: 2024-05-25

## 2024-04-17 PROBLEM — I25.10 ARTERIOSCLEROSIS OF CORONARY ARTERY: Status: ACTIVE | Noted: 2024-04-17

## 2024-04-17 PROBLEM — I77.9 DISORDER OF CAROTID ARTERY (CMS-HCC): Status: ACTIVE | Noted: 2024-04-17

## 2024-04-18 ENCOUNTER — OFFICE VISIT (OUTPATIENT)
Dept: PRIMARY CARE | Facility: CLINIC | Age: 64
End: 2024-04-18
Payer: MEDICARE

## 2024-04-18 VITALS
TEMPERATURE: 97.6 F | OXYGEN SATURATION: 96 % | DIASTOLIC BLOOD PRESSURE: 80 MMHG | BODY MASS INDEX: 41.75 KG/M2 | HEIGHT: 73 IN | HEART RATE: 86 BPM | RESPIRATION RATE: 18 BRPM | WEIGHT: 315 LBS | SYSTOLIC BLOOD PRESSURE: 124 MMHG

## 2024-04-18 DIAGNOSIS — R33.9 URINARY RETENTION: ICD-10-CM

## 2024-04-18 DIAGNOSIS — Z00.00 ROUTINE GENERAL MEDICAL EXAMINATION AT HEALTH CARE FACILITY: ICD-10-CM

## 2024-04-18 DIAGNOSIS — Z71.89 ACP (ADVANCE CARE PLANNING): ICD-10-CM

## 2024-04-18 DIAGNOSIS — F34.1 PERSISTENT DEPRESSIVE DISORDER: ICD-10-CM

## 2024-04-18 DIAGNOSIS — M54.16 LUMBAR RADICULOPATHY: ICD-10-CM

## 2024-04-18 DIAGNOSIS — Z00.00 ENCOUNTER FOR MEDICARE ANNUAL WELLNESS EXAM: Primary | ICD-10-CM

## 2024-04-18 DIAGNOSIS — E24.9 HYPERCORTISOLISM (MULTI): ICD-10-CM

## 2024-04-18 LAB
POC APPEARANCE, URINE: CLEAR
POC BILIRUBIN, URINE: NEGATIVE
POC BLOOD, URINE: NEGATIVE
POC COLOR, URINE: YELLOW
POC GLUCOSE, URINE: NEGATIVE MG/DL
POC KETONES, URINE: NEGATIVE MG/DL
POC LEUKOCYTES, URINE: ABNORMAL
POC NITRITE,URINE: NEGATIVE
POC PH, URINE: 5.5 PH
POC PROTEIN, URINE: NEGATIVE MG/DL
POC SPECIFIC GRAVITY, URINE: 1.02
POC UROBILINOGEN, URINE: 0.2 EU/DL

## 2024-04-18 PROCEDURE — 81002 URINALYSIS NONAUTO W/O SCOPE: CPT | Performed by: FAMILY MEDICINE

## 2024-04-18 PROCEDURE — 99497 ADVNCD CARE PLAN 30 MIN: CPT | Performed by: FAMILY MEDICINE

## 2024-04-18 PROCEDURE — 99212 OFFICE O/P EST SF 10 MIN: CPT | Performed by: FAMILY MEDICINE

## 2024-04-18 PROCEDURE — G0439 PPPS, SUBSEQ VISIT: HCPCS | Performed by: FAMILY MEDICINE

## 2024-04-18 RX ORDER — BUPROPION HYDROCHLORIDE 150 MG/1
150 TABLET, EXTENDED RELEASE ORAL 2 TIMES DAILY
Qty: 60 TABLET | Refills: 1 | Status: SHIPPED | OUTPATIENT
Start: 2024-04-18 | End: 2024-06-09 | Stop reason: WASHOUT

## 2024-04-18 ASSESSMENT — ENCOUNTER SYMPTOMS
RHINORRHEA: 0
TROUBLE SWALLOWING: 0
SINUS PRESSURE: 0
ARTHRALGIAS: 1
COLOR CHANGE: 0
CONSTIPATION: 0
HEADACHES: 0
MYALGIAS: 0
CONSTITUTIONAL NEGATIVE: 1
COUGH: 0
FLANK PAIN: 0
SHORTNESS OF BREATH: 0
POLYDIPSIA: 0
SPEECH DIFFICULTY: 0
APPETITE CHANGE: 0
ADENOPATHY: 0
ACTIVITY CHANGE: 0
BLOOD IN STOOL: 0
SEIZURES: 0
ABDOMINAL DISTENTION: 0
FEVER: 0
HEMATURIA: 0
STRIDOR: 0
PALPITATIONS: 0
DECREASED CONCENTRATION: 0
DIARRHEA: 0
NERVOUS/ANXIOUS: 0
FATIGUE: 0
POLYPHAGIA: 0
LOSS OF SENSATION IN FEET: 0
NECK STIFFNESS: 0
DYSURIA: 0
OCCASIONAL FEELINGS OF UNSTEADINESS: 0
EYE PAIN: 0
ABDOMINAL PAIN: 0
CHEST TIGHTNESS: 0
DIZZINESS: 0
PHOTOPHOBIA: 0
SORE THROAT: 0
CONFUSION: 0
DEPRESSION: 0
RECTAL PAIN: 0
SINUS PAIN: 0

## 2024-04-18 ASSESSMENT — ACTIVITIES OF DAILY LIVING (ADL)
TAKING_MEDICATION: INDEPENDENT
DOING_HOUSEWORK: INDEPENDENT
MANAGING_FINANCES: INDEPENDENT
GROCERY_SHOPPING: INDEPENDENT
DRESSING: INDEPENDENT
BATHING: INDEPENDENT

## 2024-04-18 ASSESSMENT — PATIENT HEALTH QUESTIONNAIRE - PHQ9
SUM OF ALL RESPONSES TO PHQ9 QUESTIONS 1 AND 2: 0
2. FEELING DOWN, DEPRESSED OR HOPELESS: NOT AT ALL
1. LITTLE INTEREST OR PLEASURE IN DOING THINGS: NOT AT ALL

## 2024-04-18 NOTE — PATIENT INSTRUCTIONS
Follow up in 4 weeks    Continue current medications and therapy for chronic medical conditions.    Patient was advised importance of proper diet/nutrition in addition adequate hydration. Patient was encouraged moderate exercise program to include 30 minutes daily for 5 days of the week or 150 minutes weekly. Patient will follow-up with us as scheduled.    I personally reviewed the OARRS report for this patient. I have considered the risks of abuse, dependence, addiction, and diversion.    UDS/CSA: 01/23/2024    Complete Medicare annual wellness physical/exam     Counseled on Advance Directives    IO UA today     Obtain DHEA and Cortisol

## 2024-04-18 NOTE — PROGRESS NOTES
Subjective   Reason for Visit: Leno Viveros is an 64 y.o. male here for a Medicare Wellness visit.     Past Medical, Surgical, and Family History reviewed and updated in chart.         HPI  Patient is here for medicare wellness visit.    He would like to review EMG from 3/4/24.    Patient is only taking Lorazepam 1 mg medications.   All other medications have been stopped.    Right shoulder pain is increased.       Patient Care Team:  Wai Stevenson DO as PCP - General (Family Medicine)  Wai Stevenson DO as PCP - [facility] Attributed Provider     Review of Systems   Constitutional: Negative.  Negative for activity change, appetite change, fatigue and fever.   HENT:  Negative for congestion, dental problem, ear discharge, ear pain, mouth sores, rhinorrhea, sinus pressure, sinus pain, sore throat, tinnitus and trouble swallowing.    Eyes:  Negative for photophobia, pain and visual disturbance.   Respiratory:  Negative for cough, chest tightness, shortness of breath and stridor.    Cardiovascular:  Negative for chest pain and palpitations.   Gastrointestinal:  Negative for abdominal distention, abdominal pain, blood in stool, constipation, diarrhea and rectal pain.   Endocrine: Negative for cold intolerance, heat intolerance, polydipsia, polyphagia and polyuria.   Genitourinary:  Negative for dysuria, flank pain, hematuria and urgency.   Musculoskeletal:  Positive for arthralgias. Negative for gait problem, myalgias and neck stiffness.        Right shoulder pain    Skin:  Negative for color change and rash.   Allergic/Immunologic: Negative for environmental allergies and food allergies.   Neurological:  Negative for dizziness, seizures, syncope, speech difficulty and headaches.   Hematological:  Negative for adenopathy.   Psychiatric/Behavioral:  Positive for agitation, dysphoric mood and sleep disturbance. Negative for confusion and decreased concentration. The patient is not nervous/anxious.   "      Objective   Vitals:  /80 (BP Location: Left arm, Patient Position: Sitting, BP Cuff Size: Large adult)   Pulse 86   Temp 36.4 °C (97.6 °F) (Temporal)   Resp 18   Ht 1.854 m (6' 1\")   Wt (!) 153 kg (336 lb 12.8 oz)   SpO2 96%   BMI 44.44 kg/m²       Physical Exam  Vitals reviewed.   Constitutional:       General: He is not in acute distress.     Appearance: He is obese. He is not ill-appearing or diaphoretic.   HENT:      Head: Normocephalic.      Right Ear: Tympanic membrane and external ear normal.      Left Ear: Tympanic membrane and external ear normal.      Nose: Nose normal. No congestion.      Mouth/Throat:      Pharynx: No posterior oropharyngeal erythema.   Eyes:      General:         Right eye: No discharge.         Left eye: No discharge.      Extraocular Movements: Extraocular movements intact.      Conjunctiva/sclera: Conjunctivae normal.      Pupils: Pupils are equal, round, and reactive to light.   Cardiovascular:      Rate and Rhythm: Normal rate and regular rhythm.      Pulses: Normal pulses.      Heart sounds: Normal heart sounds. No murmur heard.  Pulmonary:      Effort: Pulmonary effort is normal. No respiratory distress.      Breath sounds: Normal breath sounds. No wheezing or rales.   Chest:      Chest wall: No tenderness.   Abdominal:      General: Bowel sounds are normal. There is distension.      Palpations: There is no mass.      Tenderness: There is no abdominal tenderness. There is no guarding.   Musculoskeletal:         General: No tenderness. Normal range of motion.      Cervical back: Normal range of motion and neck supple. No tenderness.      Right lower leg: No edema.      Left lower leg: No edema.   Skin:     General: Skin is dry.      Coloration: Skin is not jaundiced.      Findings: No bruising, erythema or rash.   Neurological:      General: No focal deficit present.      Mental Status: He is alert and oriented to person, place, and time. Mental status is at " baseline.      Cranial Nerves: No cranial nerve deficit.      Sensory: No sensory deficit.      Coordination: Coordination normal.      Gait: Gait normal.   Psychiatric:         Thought Content: Thought content normal.         Judgment: Judgment normal.      Comments: Depressed, flat affect, tearful         Assessment/Plan   Problem List Items Addressed This Visit       Depression    Relevant Medications    buPROPion SR (Wellbutrin SR) 150 mg 12 hr tablet    Urinary retention    Relevant Orders    POCT UA (nonautomated) manually resulted (Completed)     Other Visit Diagnoses       Encounter for Medicare annual wellness exam    -  Primary    Routine general medical examination at health care facility        ACP (advance care planning)        Lumbar radiculopathy        Relevant Orders    MR lumbar spine wo IV contrast    Hypercortisolism (Multi)        Relevant Orders    DHEA level    Cortisol                 Scribe Attestation  By signing my name below, IEda RMA , Dianne   attest that this documentation has been prepared under the direction and in the presence of Wai Stevenson DO.   Provider Attestation - Scribe documentation    All medical record entries made by the Scribe were at my direction and personally dictated by me. I have reviewed the chart and agree that the record accurately reflects my personal performance of the history, physical exam, discussion and plan.

## 2024-04-21 ASSESSMENT — ENCOUNTER SYMPTOMS
SLEEP DISTURBANCE: 1
AGITATION: 1
DYSPHORIC MOOD: 1

## 2024-05-03 ENCOUNTER — HOSPITAL ENCOUNTER (OUTPATIENT)
Dept: RADIOLOGY | Facility: HOSPITAL | Age: 64
Discharge: HOME | End: 2024-05-03
Payer: MEDICARE

## 2024-05-03 ENCOUNTER — TELEPHONE (OUTPATIENT)
Dept: PRIMARY CARE | Facility: CLINIC | Age: 64
End: 2024-05-03
Payer: MEDICARE

## 2024-05-03 DIAGNOSIS — M54.16 LUMBAR RADICULOPATHY: ICD-10-CM

## 2024-05-03 PROCEDURE — 72148 MRI LUMBAR SPINE W/O DYE: CPT | Mod: RSC

## 2024-05-03 NOTE — TELEPHONE ENCOUNTER
PT OF [facility]     PT SPOUSE HAS CALLED IN TO INFORM DR EDWARDS THAT PATIENT HAS HAD AN ANXIETY ATTACK WHILE GETTING HIS MRI OF THE LUMBAR SPINE, THEY WILL TRY TO R/S WITH [facility] WHO HAS AN OPEN MRI MACHINE TO SEE IF HE CAN HANDLE THAT BETTER.

## 2024-05-09 ENCOUNTER — TELEPHONE (OUTPATIENT)
Dept: PRIMARY CARE | Facility: CLINIC | Age: 64
End: 2024-05-09
Payer: MEDICARE

## 2024-05-09 DIAGNOSIS — G89.29 CHRONIC BILATERAL LOW BACK PAIN WITHOUT SCIATICA: ICD-10-CM

## 2024-05-09 DIAGNOSIS — M54.50 CHRONIC BILATERAL LOW BACK PAIN WITHOUT SCIATICA: ICD-10-CM

## 2024-05-09 DIAGNOSIS — M54.16 LUMBAR RADICULOPATHY: ICD-10-CM

## 2024-05-09 PROBLEM — R39.11 URINARY HESITANCY DUE TO BENIGN PROSTATIC HYPERPLASIA: Status: ACTIVE | Noted: 2024-05-09

## 2024-05-09 PROBLEM — I10 HYPERTENSION: Status: ACTIVE | Noted: 2024-05-09

## 2024-05-09 PROBLEM — Z87.891 FORMER SMOKER: Status: ACTIVE | Noted: 2024-05-09

## 2024-05-09 PROBLEM — R35.1 NOCTURIA: Status: ACTIVE | Noted: 2024-05-09

## 2024-05-09 PROBLEM — N40.1 URINARY HESITANCY DUE TO BENIGN PROSTATIC HYPERPLASIA: Status: ACTIVE | Noted: 2024-05-09

## 2024-05-09 PROBLEM — U07.1 COVID-19 VIRUS INFECTION: Status: ACTIVE | Noted: 2024-05-09

## 2024-05-09 PROBLEM — L72.3 SEBACEOUS CYST: Status: ACTIVE | Noted: 2024-05-09

## 2024-05-09 NOTE — TELEPHONE ENCOUNTER
PT ZONIA EDWARDS     PT SPOUSE CALLED IN FOR A NEW ORDER TO [facility] WHO HAS AN OPEN MRI MACHINE     PT WAS CLAUSTROPHOBIC AND COULD NOT PERFORM THE IMAGINGS    MRI LUMBAR SPINE WO IV CONTRAST     SPOUSE WILL CALL BACK WITH [facility] FX NUMBER SO THAT ORDER COULD BE FAXED TO THEM WHEN COMPLETED

## 2024-05-10 ENCOUNTER — APPOINTMENT (OUTPATIENT)
Dept: PRIMARY CARE | Facility: CLINIC | Age: 64
End: 2024-05-10
Payer: MEDICARE

## 2024-05-17 DIAGNOSIS — M47.26 OSTEOARTHRITIS OF SPINE WITH RADICULOPATHY, LUMBAR REGION: ICD-10-CM

## 2024-05-17 DIAGNOSIS — F41.1 GENERALIZED ANXIETY DISORDER: ICD-10-CM

## 2024-05-17 NOTE — TELEPHONE ENCOUNTER
PT NILO MORAES WAS IN OFFICE WITH SPOUSE ON WEDNESDAY THE 15TH AND HE TOLD THE PATIENT HE WILL SEND OVER PAN MEDICATION AND VALIUM FOR ANXIETY, THEY HAVE NOT RECEIVED THIS MEDICATION AS OF YET AND WAS CALLING IN TO CHECK THE STATUS       [facility] [address] [facility]

## 2024-05-20 RX ORDER — TRAMADOL HYDROCHLORIDE 50 MG/1
50 TABLET ORAL EVERY 8 HOURS PRN
Qty: 21 TABLET | Refills: 0 | Status: SHIPPED | OUTPATIENT
Start: 2024-05-20

## 2024-05-20 RX ORDER — DIAZEPAM 5 MG/1
5 TABLET ORAL EVERY 6 HOURS PRN
Qty: 25 TABLET | Refills: 0 | Status: SHIPPED | OUTPATIENT
Start: 2024-05-20

## 2024-05-22 DIAGNOSIS — F41.1 GENERALIZED ANXIETY DISORDER: ICD-10-CM

## 2024-05-22 DIAGNOSIS — M47.26 OSTEOARTHRITIS OF SPINE WITH RADICULOPATHY, LUMBAR REGION: ICD-10-CM

## 2024-05-22 DIAGNOSIS — F33.41 RECURRENT MAJOR DEPRESSIVE DISORDER, IN PARTIAL REMISSION (CMS-HCC): ICD-10-CM

## 2024-05-22 NOTE — TELEPHONE ENCOUNTER
[facility] CALLED + NEED A DAY SUPPLY ON THESE TWO MEDICATIONS:  traMADol (Ultram) 50 mg tablet   diazePAM (Valium) 5 mg tablet

## 2024-05-25 RX ORDER — DIAZEPAM 5 MG/1
5 TABLET ORAL EVERY 6 HOURS PRN
Qty: 25 TABLET | Refills: 0 | OUTPATIENT
Start: 2024-05-25

## 2024-05-25 RX ORDER — LORAZEPAM 1 MG/1
1 TABLET ORAL 2 TIMES DAILY PRN
Qty: 10 TABLET | Refills: 0 | Status: SHIPPED | OUTPATIENT
Start: 2024-05-25

## 2024-05-25 RX ORDER — TRAMADOL HYDROCHLORIDE 50 MG/1
50 TABLET ORAL EVERY 8 HOURS PRN
Qty: 21 TABLET | Refills: 0 | OUTPATIENT
Start: 2024-05-25

## 2024-05-28 ENCOUNTER — TELEPHONE (OUTPATIENT)
Dept: PRIMARY CARE | Facility: CLINIC | Age: 64
End: 2024-05-28

## 2024-06-03 ENCOUNTER — OFFICE VISIT (OUTPATIENT)
Dept: PRIMARY CARE | Facility: CLINIC | Age: 64
End: 2024-06-03
Payer: MEDICARE

## 2024-06-03 VITALS
HEART RATE: 84 BPM | DIASTOLIC BLOOD PRESSURE: 80 MMHG | WEIGHT: 315 LBS | TEMPERATURE: 98.1 F | RESPIRATION RATE: 16 BRPM | HEIGHT: 73 IN | SYSTOLIC BLOOD PRESSURE: 130 MMHG | BODY MASS INDEX: 41.75 KG/M2 | OXYGEN SATURATION: 97 %

## 2024-06-03 DIAGNOSIS — F33.41 RECURRENT MAJOR DEPRESSIVE DISORDER, IN PARTIAL REMISSION (CMS-HCC): Primary | ICD-10-CM

## 2024-06-03 DIAGNOSIS — G89.29 CHRONIC BILATERAL LOW BACK PAIN WITHOUT SCIATICA: ICD-10-CM

## 2024-06-03 DIAGNOSIS — F31.62 BIPOLAR DISORDER, CURRENT EPISODE MIXED, MODERATE (MULTI): ICD-10-CM

## 2024-06-03 DIAGNOSIS — R73.9 HYPERGLYCEMIA: ICD-10-CM

## 2024-06-03 DIAGNOSIS — M47.26 OSTEOARTHRITIS OF SPINE WITH RADICULOPATHY, LUMBAR REGION: ICD-10-CM

## 2024-06-03 DIAGNOSIS — R33.9 URINARY RETENTION: ICD-10-CM

## 2024-06-03 DIAGNOSIS — M54.50 CHRONIC BILATERAL LOW BACK PAIN WITHOUT SCIATICA: ICD-10-CM

## 2024-06-03 LAB
POC APPEARANCE, URINE: CLEAR
POC BILIRUBIN, URINE: NEGATIVE
POC BLOOD, URINE: NEGATIVE
POC COLOR, URINE: YELLOW
POC GLUCOSE, URINE: NEGATIVE MG/DL
POC KETONES, URINE: NEGATIVE MG/DL
POC LEUKOCYTES, URINE: NEGATIVE
POC NITRITE,URINE: NEGATIVE
POC PH, URINE: 5.5 PH
POC PROTEIN, URINE: NEGATIVE MG/DL
POC SPECIFIC GRAVITY, URINE: 1.02
POC UROBILINOGEN, URINE: 0.2 EU/DL

## 2024-06-03 PROCEDURE — 99214 OFFICE O/P EST MOD 30 MIN: CPT | Performed by: FAMILY MEDICINE

## 2024-06-03 PROCEDURE — 81002 URINALYSIS NONAUTO W/O SCOPE: CPT | Performed by: FAMILY MEDICINE

## 2024-06-03 RX ORDER — GABAPENTIN 300 MG/1
300 CAPSULE ORAL 3 TIMES DAILY
COMMUNITY

## 2024-06-03 RX ORDER — CARBAMAZEPINE 100 MG/1
1 CAPSULE, EXTENDED RELEASE ORAL 3 TIMES DAILY
Qty: 90 CAPSULE | Refills: 0 | Status: SHIPPED | OUTPATIENT
Start: 2024-06-03

## 2024-06-03 ASSESSMENT — ENCOUNTER SYMPTOMS
SINUS PRESSURE: 0
PHOTOPHOBIA: 0
SEIZURES: 0
SLEEP DISTURBANCE: 0
TROUBLE SWALLOWING: 0
SHORTNESS OF BREATH: 0
ABDOMINAL DISTENTION: 0
PALPITATIONS: 0
POLYPHAGIA: 0
APPETITE CHANGE: 0
ABDOMINAL PAIN: 0
POLYDIPSIA: 0
EYE PAIN: 0
MYALGIAS: 0
DIARRHEA: 0
BLOOD IN STOOL: 0
CONSTITUTIONAL NEGATIVE: 1
DYSURIA: 0
NECK STIFFNESS: 0
ARTHRALGIAS: 0
DYSPHORIC MOOD: 0
FLANK PAIN: 0
CONSTIPATION: 0
SPEECH DIFFICULTY: 0
SINUS PAIN: 0
HEADACHES: 0
AGITATION: 0
HEMATURIA: 0
RECTAL PAIN: 0
STRIDOR: 0
SORE THROAT: 0
CHEST TIGHTNESS: 0
RHINORRHEA: 0
ACTIVITY CHANGE: 0
ADENOPATHY: 0
DIZZINESS: 0
FATIGUE: 0
FEVER: 0
CONFUSION: 0
COUGH: 0
COLOR CHANGE: 0

## 2024-06-03 NOTE — PROGRESS NOTES
Subjective   Patient ID: Leno Viveros is a 64 y.o. male who presents for Follow-up, Results, and OTHER (Pt states that the music from his call with [employer] made him start to cry and feel weepy. Pt would like to discuss. ).    Pt is in office to go over his Lexapro and Ativan medication. Pt states that the medication works well for him. Pt is not currently on Lexapro but would like to discuss.  Pt would like to discuss possibly getting  the Equetro medication or the generic.     Pt would like to go over his UA test results. Pt would like to do another UA test.     Pt states that the music from his call with [employer] made him start to cry and feel weepy. Pt would like to discuss.          Review of Systems   Constitutional: Negative.  Negative for activity change, appetite change, fatigue and fever.   HENT:  Negative for congestion, dental problem, ear discharge, ear pain, mouth sores, rhinorrhea, sinus pressure, sinus pain, sore throat, tinnitus and trouble swallowing.    Eyes:  Negative for photophobia, pain and visual disturbance.   Respiratory:  Negative for cough, chest tightness, shortness of breath and stridor.    Cardiovascular:  Negative for chest pain and palpitations.   Gastrointestinal:  Negative for abdominal distention, abdominal pain, blood in stool, constipation, diarrhea and rectal pain.   Endocrine: Negative for cold intolerance, heat intolerance, polydipsia, polyphagia and polyuria.   Genitourinary:  Negative for dysuria, flank pain, hematuria and urgency.   Musculoskeletal:  Negative for arthralgias, gait problem, myalgias and neck stiffness.   Skin:  Negative for color change and rash.   Allergic/Immunologic: Negative for environmental allergies and food allergies.   Neurological:  Negative for dizziness, seizures, syncope, speech difficulty and headaches.   Hematological:  Negative for adenopathy.   Psychiatric/Behavioral:  Positive for decreased concentration. Negative for agitation, confusion,  "dysphoric mood and sleep disturbance. The patient is nervous/anxious.        Objective   /80 (BP Location: Left arm, Patient Position: Sitting)   Pulse 84   Temp 36.7 °C (98.1 °F) (Temporal)   Resp 16   Ht 1.854 m (6' 1\")   Wt (!) 156 kg (343 lb 6.4 oz)   SpO2 97%   BMI 45.31 kg/m²     Physical Exam  Vitals reviewed.   Constitutional:       General: He is not in acute distress.     Appearance: He is obese. He is not ill-appearing or diaphoretic.   HENT:      Head: Normocephalic.      Right Ear: Tympanic membrane and external ear normal.      Left Ear: Tympanic membrane and external ear normal.      Nose: Nose normal. No congestion.      Mouth/Throat:      Pharynx: No posterior oropharyngeal erythema.   Eyes:      General:         Right eye: No discharge.         Left eye: No discharge.      Extraocular Movements: Extraocular movements intact.      Conjunctiva/sclera: Conjunctivae normal.      Pupils: Pupils are equal, round, and reactive to light.   Cardiovascular:      Rate and Rhythm: Normal rate and regular rhythm.      Pulses: Normal pulses.      Heart sounds: Normal heart sounds. No murmur heard.  Pulmonary:      Effort: Pulmonary effort is normal. No respiratory distress.      Breath sounds: Normal breath sounds. No wheezing or rales.   Chest:      Chest wall: No tenderness.   Abdominal:      General: There is distension.      Palpations: There is no mass.      Tenderness: There is no abdominal tenderness. There is no guarding.   Musculoskeletal:         General: No tenderness. Normal range of motion.      Cervical back: Normal range of motion and neck supple. No tenderness.      Right lower leg: No edema.      Left lower leg: No edema.   Skin:     General: Skin is dry.      Coloration: Skin is not jaundiced.      Findings: No bruising, erythema or rash.   Neurological:      General: No focal deficit present.      Mental Status: He is alert and oriented to person, place, and time. Mental status is " at baseline.      Cranial Nerves: No cranial nerve deficit.      Sensory: No sensory deficit.      Coordination: Coordination normal.      Gait: Gait normal.   Psychiatric:         Thought Content: Thought content normal.         Judgment: Judgment normal.      Comments: Anxious, mild depression         Assessment/Plan   Problem List Items Addressed This Visit             ICD-10-CM    Chronic bilateral low back pain without sciatica M54.50, G89.29    Relevant Orders    MR lumbar spine wo IV contrast    Osteoarthritis of spine with radiculopathy, lumbar region M47.26    Relevant Orders    MR lumbar spine wo IV contrast    Recurrent major depressive disorder, in partial remission ([facility]) - Primary F33.41    Urinary retention R33.9    Relevant Orders    POCT UA (nonautomated) manually resulted (Completed)    Hyperglycemia R73.9    Relevant Medications    empagliflozin (Jardiance) 10 mg    Bipolar disorder, current episode mixed, moderate (Multi) F31.62    Relevant Medications    carBAMazepine, mood stabiliz, (Equetro) 100 mg capsule, ER multiphase 12 hr         Scribe Attestation  By signing my name below, IEda RMA , Scribe   attest that this documentation has been prepared under the direction and in the presence of Wai Stevenson DO.   Provider Attestation - Scribe documentation    All medical record entries made by the Scribe were at my direction and personally dictated by me. I have reviewed the chart and agree that the record accurately reflects my personal performance of the history, physical exam, discussion and plan.

## 2024-06-03 NOTE — PATIENT INSTRUCTIONS
Follow up in 4 weeks    Continue current medications and therapy for chronic medical conditions.    Patient was advised importance of proper diet/nutrition in addition adequate hydration. Patient was encouraged moderate exercise program to include 30 minutes daily for 5 days of the week or 150 minutes weekly. Patient will follow-up with us as scheduled.    I personally reviewed the OARRS report for this patient. I have considered the risks of abuse, dependence, addiction, and diversion.    UDS/CSA: 01/23/2024    Obtain UA    Start Jardiance 10 mg daily/samples    Obtain MRI of Lumbar spine- open sided and with sedation     Start Carbamezapine 100mg twice daily- may increase to 3 times daily/Equetro

## 2024-06-04 DIAGNOSIS — F31.62 BIPOLAR DISORDER, CURRENT EPISODE MIXED, MODERATE (MULTI): ICD-10-CM

## 2024-06-05 RX ORDER — CARBAMAZEPINE 100 MG/1
100 CAPSULE, EXTENDED RELEASE ORAL 2 TIMES DAILY
Qty: 60 CAPSULE | Refills: 2 | Status: SHIPPED | OUTPATIENT
Start: 2024-06-05 | End: 2024-09-03

## 2024-06-05 NOTE — TELEPHONE ENCOUNTER
DR EDWARDS PT     PT WIFE PHONED OFFICE AND STATED PT HAS MRI TOMORROW AND IT IS SUPPOSED TO BE WITH SEDATION, THE ORDER HAS STILL NOT BEEN CHANGED. PLEASE CHANGE RIGHT AWAY.  
[facility] CALLED AND SAID THERE IS NO GENERIC OF carBAMazepine, mood stabiliz, (Equetro) 100 mg capsule, ER multiphase 12 hr  AND HIS INSURANCE WILL NOT COVER THE NAME BRAND.    PHARMACIST RECOMMENDED TO SWITCH THE PT TO CARBATROL 100MG EXTENDED RELEASE, WHICH COMES IN GENERIC THAT PT'S INSURANCE WILL APPROVE.    PLEASE ADVISE AND RESEND OVER   
Patient made aware new medication is being sent over per Pratima   
Yes

## 2024-06-09 ASSESSMENT — ENCOUNTER SYMPTOMS
DECREASED CONCENTRATION: 1
NERVOUS/ANXIOUS: 1

## 2024-06-17 DIAGNOSIS — F41.1 GENERALIZED ANXIETY DISORDER: ICD-10-CM

## 2024-06-17 NOTE — TELEPHONE ENCOUNTER
Patient of Dr. Stevenson  Refill for diazePAM (Valium) 5 mg tablet  [facility] in [place] on [address]

## 2024-06-18 ENCOUNTER — APPOINTMENT (OUTPATIENT)
Dept: PRIMARY CARE | Facility: CLINIC | Age: 64
End: 2024-06-18
Payer: MEDICARE

## 2024-06-19 RX ORDER — DIAZEPAM 5 MG/1
5 TABLET ORAL EVERY 6 HOURS PRN
Qty: 30 TABLET | Refills: 0 | Status: SHIPPED | OUTPATIENT
Start: 2024-06-19

## 2024-06-28 ENCOUNTER — TELEPHONE (OUTPATIENT)
Dept: PRIMARY CARE | Facility: CLINIC | Age: 64
End: 2024-06-28

## 2024-06-28 NOTE — TELEPHONE ENCOUNTER
Please call pharmacy in regards to diazePAM (Valium) 5 mg tablet - they need to verify quantity and how long pt is supposed to be on this if it is supposed to be a full month supply.  [id number] - [place], [place] - [address] Phone: [phone]

## 2024-07-19 DIAGNOSIS — F34.1 PERSISTENT DEPRESSIVE DISORDER: ICD-10-CM

## 2024-07-19 RX ORDER — BUPROPION HYDROCHLORIDE 150 MG/1
150 TABLET, EXTENDED RELEASE ORAL 2 TIMES DAILY
Qty: 60 TABLET | Refills: 1 | Status: SHIPPED | OUTPATIENT
Start: 2024-07-19 | End: 2024-09-17

## 2024-07-19 NOTE — TELEPHONE ENCOUNTER
[id number] - [place], [place] - [address]   [address] [facility], [place] [address]   Pt needs refill on Wellbutrin please

## 2024-08-19 ENCOUNTER — APPOINTMENT (OUTPATIENT)
Dept: PRIMARY CARE | Facility: CLINIC | Age: 64
End: 2024-08-19
Payer: MEDICARE

## 2024-08-19 VITALS
SYSTOLIC BLOOD PRESSURE: 120 MMHG | DIASTOLIC BLOOD PRESSURE: 68 MMHG | RESPIRATION RATE: 16 BRPM | HEIGHT: 73 IN | WEIGHT: 315 LBS | OXYGEN SATURATION: 97 % | TEMPERATURE: 98 F | BODY MASS INDEX: 41.75 KG/M2 | HEART RATE: 76 BPM

## 2024-08-19 DIAGNOSIS — M79.7 FIBROMYALGIA: ICD-10-CM

## 2024-08-19 DIAGNOSIS — F41.1 GENERALIZED ANXIETY DISORDER: ICD-10-CM

## 2024-08-19 DIAGNOSIS — G89.29 CHRONIC BILATERAL LOW BACK PAIN WITHOUT SCIATICA: ICD-10-CM

## 2024-08-19 DIAGNOSIS — R73.9 HYPERGLYCEMIA: ICD-10-CM

## 2024-08-19 DIAGNOSIS — F34.1 PERSISTENT DEPRESSIVE DISORDER: ICD-10-CM

## 2024-08-19 DIAGNOSIS — M54.50 CHRONIC BILATERAL LOW BACK PAIN WITHOUT SCIATICA: ICD-10-CM

## 2024-08-19 LAB — POC HEMOGLOBIN A1C: 5.7 % (ref 4.2–6.5)

## 2024-08-19 PROCEDURE — 83036 HEMOGLOBIN GLYCOSYLATED A1C: CPT | Performed by: FAMILY MEDICINE

## 2024-08-19 PROCEDURE — 99214 OFFICE O/P EST MOD 30 MIN: CPT | Performed by: FAMILY MEDICINE

## 2024-08-19 RX ORDER — GABAPENTIN 300 MG/1
300 CAPSULE ORAL 3 TIMES DAILY
Qty: 90 CAPSULE | Refills: 0 | Status: SHIPPED | OUTPATIENT
Start: 2024-08-19

## 2024-08-19 RX ORDER — CYANOCOBALAMIN (VITAMIN B-12) 250 MCG
250 TABLET ORAL DAILY
COMMUNITY

## 2024-08-19 RX ORDER — BUPROPION HYDROCHLORIDE 150 MG/1
150 TABLET, EXTENDED RELEASE ORAL 2 TIMES DAILY
Qty: 60 TABLET | Refills: 2 | Status: SHIPPED | OUTPATIENT
Start: 2024-08-19 | End: 2024-10-18

## 2024-08-19 ASSESSMENT — ENCOUNTER SYMPTOMS
PALPITATIONS: 0
POLYDIPSIA: 0
BLOOD IN STOOL: 0
STRIDOR: 0
ABDOMINAL PAIN: 0
SINUS PRESSURE: 0
ABDOMINAL DISTENTION: 0
MYALGIAS: 0
SEIZURES: 0
ADENOPATHY: 0
DIZZINESS: 0
CONSTIPATION: 0
RECTAL PAIN: 0
RHINORRHEA: 0
CONSTITUTIONAL NEGATIVE: 1
AGITATION: 0
COLOR CHANGE: 0
POLYPHAGIA: 0
SPEECH DIFFICULTY: 0
TROUBLE SWALLOWING: 0
CHEST TIGHTNESS: 0
SINUS PAIN: 0
SHORTNESS OF BREATH: 0
HEMATURIA: 0
NERVOUS/ANXIOUS: 1
CONFUSION: 0
DECREASED CONCENTRATION: 0
EYE PAIN: 0
DYSURIA: 0
ACTIVITY CHANGE: 0
FEVER: 0
DYSPHORIC MOOD: 1
DIARRHEA: 0
FATIGUE: 0
SLEEP DISTURBANCE: 0
COUGH: 0
HEADACHES: 0
ARTHRALGIAS: 0
NUMBNESS: 1
NECK STIFFNESS: 0
PHOTOPHOBIA: 0
SORE THROAT: 0
APPETITE CHANGE: 0
FLANK PAIN: 0

## 2024-08-19 NOTE — PROGRESS NOTES
"Subjective   Patient ID: Leno Viveros is a 64 y.o. male who presents for Numbness and Results.    HPI   The patient is present in the office due to experiencing numbness in the feet, primarily at night, which is contributing to difficulties with sleep.    The patient reports a possible hernia in the upper middle region of the abdomen.    The patient wishes to discuss the results of his MRI.      Review of Systems   Constitutional: Negative.  Negative for activity change, appetite change, fatigue and fever.   HENT:  Negative for congestion, dental problem, ear discharge, ear pain, mouth sores, rhinorrhea, sinus pressure, sinus pain, sore throat, tinnitus and trouble swallowing.    Eyes:  Negative for photophobia, pain and visual disturbance.   Respiratory:  Negative for cough, chest tightness, shortness of breath and stridor.    Cardiovascular:  Negative for chest pain and palpitations.   Gastrointestinal:  Negative for abdominal distention, abdominal pain, blood in stool, constipation, diarrhea and rectal pain.   Endocrine: Negative for cold intolerance, heat intolerance, polydipsia, polyphagia and polyuria.   Genitourinary:  Negative for dysuria, flank pain, hematuria and urgency.   Musculoskeletal:  Negative for arthralgias, gait problem, myalgias and neck stiffness.   Skin:  Negative for color change and rash.   Allergic/Immunologic: Negative for environmental allergies and food allergies.   Neurological:  Positive for numbness. Negative for dizziness, seizures, syncope, speech difficulty and headaches.   Hematological:  Negative for adenopathy.   Psychiatric/Behavioral:  Positive for dysphoric mood. Negative for agitation, confusion, decreased concentration and sleep disturbance. The patient is nervous/anxious.        Objective   /68 (BP Location: Right arm, Patient Position: Sitting, BP Cuff Size: Large adult)   Pulse 76   Temp 36.7 °C (98 °F) (Temporal)   Resp 16   Ht 1.854 m (6' 1\")   Wt (!) 151 " kg (333 lb)   SpO2 97%   BMI 43.93 kg/m²     Physical Exam  Vitals reviewed.   Constitutional:       General: He is not in acute distress.     Appearance: Normal appearance. He is normal weight. He is not ill-appearing or diaphoretic.   HENT:      Head: Normocephalic.      Right Ear: Tympanic membrane and external ear normal.      Left Ear: Tympanic membrane and external ear normal.      Nose: Nose normal. No congestion.      Mouth/Throat:      Pharynx: No posterior oropharyngeal erythema.   Eyes:      General:         Right eye: No discharge.         Left eye: No discharge.      Extraocular Movements: Extraocular movements intact.      Conjunctiva/sclera: Conjunctivae normal.      Pupils: Pupils are equal, round, and reactive to light.   Cardiovascular:      Rate and Rhythm: Normal rate and regular rhythm.      Pulses: Normal pulses.      Heart sounds: Normal heart sounds. No murmur heard.  Pulmonary:      Effort: Pulmonary effort is normal. No respiratory distress.      Breath sounds: Normal breath sounds. No wheezing or rales.   Chest:      Chest wall: No tenderness.   Abdominal:      General: Abdomen is flat. Bowel sounds are normal. There is no distension.      Palpations: There is no mass.      Tenderness: There is no abdominal tenderness. There is no guarding.   Musculoskeletal:         General: No tenderness. Normal range of motion.      Cervical back: Normal range of motion and neck supple. No tenderness.      Right lower leg: No edema.      Left lower leg: No edema.   Skin:     General: Skin is dry.      Coloration: Skin is not jaundiced.      Findings: No bruising, erythema or rash.   Neurological:      General: No focal deficit present.      Mental Status: He is alert and oriented to person, place, and time. Mental status is at baseline.      Cranial Nerves: No cranial nerve deficit.      Sensory: No sensory deficit.      Coordination: Coordination normal.      Gait: Gait normal.   Psychiatric:          Mood and Affect: Mood normal.         Thought Content: Thought content normal.         Judgment: Judgment normal.         Assessment/Plan   Problem List Items Addressed This Visit             ICD-10-CM    Chronic bilateral low back pain without sciatica M54.50, G89.29    Relevant Medications    gabapentin (Neurontin) 300 mg capsule    Fibromyalgia M79.7    Depression F32.A    Relevant Medications    buPROPion SR (Wellbutrin SR) 150 mg 12 hr tablet    Hyperglycemia R73.9    Relevant Orders    POCT glycosylated hemoglobin (Hb A1C) manually resulted (Completed)         Scribe Attestation  By signing my name below, IEda RMA , Scribe   attest that this documentation has been prepared under the direction and in the presence of Wai Stevenson DO.   Provider Attestation - Scribe documentation    All medical record entries made by the Scribe were at my direction and personally dictated by me. I have reviewed the chart and agree that the record accurately reflects my personal performance of the history, physical exam, discussion and plan.

## 2024-08-19 NOTE — PATIENT INSTRUCTIONS
Follow up in 4 weeks    Continue current medications and therapy for chronic medical conditions.    Patient was advised importance of proper diet/nutrition in addition adequate hydration. Patient was encouraged moderate exercise program to include 30 minutes daily for 5 days of the week or 150 minutes weekly. Patient will follow-up with us as scheduled.    I personally reviewed the OARRS report for this patient. I have considered the risks of abuse, dependence, addiction, and diversion.    UDS/CSA: 01/23/2024    Start gabapentin 300 mg nightly may increase to 3 times daily    Start Wellbutrin  mg twice daily    
Continue regular diet

## 2024-08-20 DIAGNOSIS — R73.03 PRE-DIABETES: Primary | ICD-10-CM

## 2024-08-20 NOTE — TELEPHONE ENCOUNTER
OV 8/19. Pt states that diazepam is the medication he couldn't remember. He states he this works better than the gabapentin.     Pt was also expecting an RX for weight loss, maybe raf.     [facility] on [address] in [place]

## 2024-08-21 RX ORDER — TIRZEPATIDE 2.5 MG/.5ML
2.5 INJECTION, SOLUTION SUBCUTANEOUS
Qty: 2 ML | Refills: 0 | Status: SHIPPED | OUTPATIENT
Start: 2024-08-25

## 2024-08-24 RX ORDER — DIAZEPAM 5 MG/1
5 TABLET ORAL EVERY 6 HOURS PRN
Qty: 30 TABLET | Refills: 0 | Status: SHIPPED | OUTPATIENT
Start: 2024-08-24

## 2024-08-28 ENCOUNTER — TELEPHONE (OUTPATIENT)
Dept: PRIMARY CARE | Facility: CLINIC | Age: 64
End: 2024-08-28
Payer: MEDICARE

## 2024-09-03 ENCOUNTER — OFFICE VISIT (OUTPATIENT)
Dept: PRIMARY CARE | Facility: CLINIC | Age: 64
End: 2024-09-03
Payer: MEDICARE

## 2024-09-03 VITALS
TEMPERATURE: 97.2 F | BODY MASS INDEX: 41.75 KG/M2 | HEIGHT: 73 IN | WEIGHT: 315 LBS | RESPIRATION RATE: 16 BRPM | DIASTOLIC BLOOD PRESSURE: 83 MMHG | HEART RATE: 92 BPM | SYSTOLIC BLOOD PRESSURE: 147 MMHG | OXYGEN SATURATION: 97 %

## 2024-09-03 DIAGNOSIS — E66.01 CLASS 3 SEVERE OBESITY WITH SERIOUS COMORBIDITY AND BODY MASS INDEX (BMI) OF 40.0 TO 44.9 IN ADULT, UNSPECIFIED OBESITY TYPE (MULTI): ICD-10-CM

## 2024-09-03 DIAGNOSIS — R39.9 SYMPTOMS OF URINARY TRACT INFECTION: Primary | ICD-10-CM

## 2024-09-03 DIAGNOSIS — R35.1 NOCTURIA: ICD-10-CM

## 2024-09-03 DIAGNOSIS — R39.15 URGENCY OF URINATION: ICD-10-CM

## 2024-09-03 LAB
POC APPEARANCE, URINE: ABNORMAL
POC BILIRUBIN, URINE: NEGATIVE
POC BLOOD, URINE: NEGATIVE
POC COLOR, URINE: ABNORMAL
POC GLUCOSE, URINE: NEGATIVE MG/DL
POC KETONES, URINE: ABNORMAL MG/DL
POC LEUKOCYTES, URINE: NEGATIVE
POC NITRITE,URINE: NEGATIVE
POC PH, URINE: 7 PH
POC PROTEIN, URINE: NEGATIVE MG/DL
POC SPECIFIC GRAVITY, URINE: 1.02
POC UROBILINOGEN, URINE: 0.2 EU/DL

## 2024-09-03 PROCEDURE — 81003 URINALYSIS AUTO W/O SCOPE: CPT | Performed by: NURSE PRACTITIONER

## 2024-09-03 PROCEDURE — 87086 URINE CULTURE/COLONY COUNT: CPT

## 2024-09-03 PROCEDURE — 99213 OFFICE O/P EST LOW 20 MIN: CPT | Performed by: NURSE PRACTITIONER

## 2024-09-03 RX ORDER — SULFAMETHOXAZOLE AND TRIMETHOPRIM 800; 160 MG/1; MG/1
1 TABLET ORAL 2 TIMES DAILY
Qty: 14 TABLET | Refills: 0 | Status: SHIPPED | OUTPATIENT
Start: 2024-09-03 | End: 2024-09-10

## 2024-09-03 ASSESSMENT — PATIENT HEALTH QUESTIONNAIRE - PHQ9
2. FEELING DOWN, DEPRESSED OR HOPELESS: NOT AT ALL
SUM OF ALL RESPONSES TO PHQ9 QUESTIONS 1 AND 2: 0
2. FEELING DOWN, DEPRESSED OR HOPELESS: NOT AT ALL
1. LITTLE INTEREST OR PLEASURE IN DOING THINGS: NOT AT ALL
SUM OF ALL RESPONSES TO PHQ9 QUESTIONS 1 AND 2: 0
1. LITTLE INTEREST OR PLEASURE IN DOING THINGS: NOT AT ALL

## 2024-09-03 ASSESSMENT — ENCOUNTER SYMPTOMS
OCCASIONAL FEELINGS OF UNSTEADINESS: 0
LOSS OF SENSATION IN FEET: 0
DEPRESSION: 0

## 2024-09-03 NOTE — PROGRESS NOTES
"Subjective   Patient ID: Leno Viveros is a 64 y.o. male who is with complaint of symptoms of UTI.    HPI  Patient is a 64 y.o. male who CONSULTED AT [facility] today. Patient is with complaints of urgency of urination, sensation of inadequate emptying post voiding, nocturia, and mild incontinence. Patient denies having any burning sensation on urination, increased urinary frequency, lower abdominal discomfort (dysuria), low back pain, flank pain, cloudy urine, blood in urine, nausea, vomiting, chills, nor fever.  Patient states symptoms has been going on for 3 days. Patient has not taken any medication for relief of symptoms. He did an OTC urine test and it was \"positive for UTI\".     Review of Systems  General: no weight loss, generally healthy, no fatigue  Head:  no headaches / sinus pain, no vertigo, no injury  Eyes: no diplopia, no tearing, no pain,   Ears: no change in hearing, no tinnitus, no bleeding, no vertigo  Mouth:  no dental difficulties, no gingival bleeding, no sore throat, no loss of sense of taste  Nose: no congestion, no  discharge, no bleeding, no obstruction, no loss of sense of smell  Neck: no stiffness, no pain, no tenderness, no masses, no bruit  Pulmonary: no dyspnea, no wheezing, no hemoptysis, no cough  Cardiovascular: no chest pain, no palpitations, no syncope, no orthopnea  Gastrointestinal: no change in appetite, no dysphagia, no abdominal pains, no diarrhea, no emesis, no melena  Genito Urinary: (+) urgency of urination, (+) sensation of inadequate emptying post voiding, (+) nocturia, (+) mild incontinence, no burning sensation on urination, no increased urinary frequency, no lower abdominal discomfort (dysuria), no low back pain, no flank pain, no cloudy urine, no blood in urine,   Musculoskeletal: no muscle ache, no joint pain, no limitation of range of motion, no paresthesia, no numbness  Constitutional: no fever, no chills, no night sweats    Objective "   Physical Exam  General: ambulatory, in no acute distress  Head: normocephalic, no lesions  Eyes: pink palpebral conjunctiva, anicteric sclerae, PERRLA, EOM's full  Abdomen: flat, NABS, soft, no direct tenderness, no rebound tenderness, no mass palpated, SIGNS: no Bradford, no Rovsings, no Psoas, no Obturator; No CVA tenderness,  Musculoskeletal: no limitation of range of motion, no paralysis, no deformity  Extremities: full and equal peripheral pulses, no edema,    Assessment/Plan   Problem List Items Addressed This Visit             ICD-10-CM    Nocturia R35.1    Relevant Medications    sulfamethoxazole-trimethoprim (Bactrim DS) 800-160 mg tablet    Other Relevant Orders    POCT UA Automated manually resulted (Completed)    Urine Culture    BMI 40.0-44.9, adult (Multi) Z68.41     Other Visit Diagnoses         Codes    Symptoms of urinary tract infection    -  Primary R39.9    Relevant Medications    sulfamethoxazole-trimethoprim (Bactrim DS) 800-160 mg tablet    Other Relevant Orders    POCT UA Automated manually resulted (Completed)    Urine Culture    Urgency of urination     R39.15    Relevant Medications    sulfamethoxazole-trimethoprim (Bactrim DS) 800-160 mg tablet    Other Relevant Orders    POCT UA Automated manually resulted (Completed)    Urine Culture    Class 3 severe obesity with serious comorbidity and body mass index (BMI) of 40.0 to 44.9 in adult, unspecified obesity type (Multi)     E66.01, Z68.41        Urinalysis was done at the office today. Urinalysis result explained and discussed with patient. Urine sample submitted to laboratory for culture and sensitivity study. The laboratory examination requested were explained and discussed with patient.    DISCHARGE SUMMARY:   Patient seen and examined. Probable diagnosis, differential diagnosis, treatment, treatment options, and probable complications were discussed and explained to patient. he was to take medication/s associated with this visit. he  may take over-the-counter pain and/or fever medication if needed. Advised increased oral fluid intake (2 liters of water or more per day). Reinforced to continue personal hygiene. Patient to return to clinic if there is worsening or persistence of symptoms. Patient verbalized understanding.    Patient to come back in 7 - 10 days if needed for worsening symptoms.           ROSALINDA Acevedo-CNP 09/03/24 4:12 PM

## 2024-09-03 NOTE — PATIENT INSTRUCTIONS
DISCHARGE SUMMARY:   Patient seen and examined. Probable diagnosis, differential diagnosis, treatment, treatment options, and probable complications were discussed and explained to patient. he was to take medication/s associated with this visit. he may take over-the-counter pain and/or fever medication if needed. Advised increased oral fluid intake (2 liters of water or more per day). Reinforced to continue personal hygiene. Patient to return to clinic if there is worsening or persistence of symptoms. Patient verbalized understanding.    Patient to come back in 7 - 10 days if needed for worsening symptoms.

## 2024-09-03 NOTE — PROGRESS NOTES
Subjective   Patient ID: Leno Viveros is a 64 y.o. male who presents for URI.    Symptoms:  pressure, hesitancy.  Length of symptoms:  2 days ago  OTC: none  Related information:    HPI     Review of Systems    Objective   There were no vitals taken for this visit.    Physical Exam    Assessment/Plan

## 2024-09-05 LAB — BACTERIA UR CULT: NO GROWTH

## 2024-09-06 ENCOUNTER — DOCUMENTATION (OUTPATIENT)
Dept: PRIMARY CARE | Facility: CLINIC | Age: 64
End: 2024-09-06
Payer: MEDICARE

## 2024-09-06 NOTE — PROGRESS NOTES
I called and talked to patient's wife (Chari). We discussed lab results. Patient symptoms are getting better.

## 2024-09-07 ENCOUNTER — TELEPHONE (OUTPATIENT)
Dept: PRIMARY CARE | Facility: CLINIC | Age: 64
End: 2024-09-07
Payer: MEDICARE

## 2024-09-07 NOTE — TELEPHONE ENCOUNTER
Dr Stevenson pt    Pt phoned office and stated that MANUELA left a message to see how the diazepam is working. The diazepam is working and that the pt is also still taking the wellbutrin.

## 2024-09-12 ENCOUNTER — TELEPHONE (OUTPATIENT)
Dept: PRIMARY CARE | Facility: CLINIC | Age: 64
End: 2024-09-12

## 2024-09-14 NOTE — TELEPHONE ENCOUNTER
Patient of Dr. Dumont      Refill request     Disp Refills Start End    diazePAM (Valium) 5 mg tablet 30 tablet 0 8/24/2024 --    Sig - Route: Take 1 tablet (5 mg) by mouth every 6 hours if needed for anxiety, sleep or muscle spasms. - oral        [id number] - [place], [place] - [address] Phone: [phone]   Fax: [phone]

## 2024-09-15 DIAGNOSIS — F41.1 GENERALIZED ANXIETY DISORDER: ICD-10-CM

## 2024-09-15 RX ORDER — DIAZEPAM 5 MG/1
5 TABLET ORAL EVERY 8 HOURS PRN
Qty: 12 TABLET | Refills: 0 | Status: SHIPPED | OUTPATIENT
Start: 2024-09-15 | End: 2024-09-18 | Stop reason: SDUPTHER

## 2024-09-18 ENCOUNTER — APPOINTMENT (OUTPATIENT)
Dept: PRIMARY CARE | Facility: CLINIC | Age: 64
End: 2024-09-18
Payer: MEDICARE

## 2024-09-18 VITALS
TEMPERATURE: 98.3 F | BODY MASS INDEX: 41.75 KG/M2 | DIASTOLIC BLOOD PRESSURE: 82 MMHG | RESPIRATION RATE: 16 BRPM | WEIGHT: 315 LBS | HEIGHT: 73 IN | OXYGEN SATURATION: 97 % | SYSTOLIC BLOOD PRESSURE: 130 MMHG | HEART RATE: 83 BPM

## 2024-09-18 DIAGNOSIS — R35.1 NOCTURIA: ICD-10-CM

## 2024-09-18 DIAGNOSIS — F41.1 GENERALIZED ANXIETY DISORDER: Primary | ICD-10-CM

## 2024-09-18 DIAGNOSIS — R35.0 URINE FREQUENCY: ICD-10-CM

## 2024-09-18 DIAGNOSIS — Z23 ENCOUNTER FOR IMMUNIZATION: ICD-10-CM

## 2024-09-18 DIAGNOSIS — N30.01 ACUTE CYSTITIS WITH HEMATURIA: ICD-10-CM

## 2024-09-18 LAB
POC APPEARANCE, URINE: CLEAR
POC BILIRUBIN, URINE: NEGATIVE
POC BLOOD, URINE: NEGATIVE
POC COLOR, URINE: YELLOW
POC GLUCOSE, URINE: NEGATIVE MG/DL
POC KETONES, URINE: NEGATIVE MG/DL
POC LEUKOCYTES, URINE: NEGATIVE
POC NITRITE,URINE: NEGATIVE
POC PH, URINE: 5.5 PH
POC PROTEIN, URINE: NEGATIVE MG/DL
POC SPECIFIC GRAVITY, URINE: 1.01
POC UROBILINOGEN, URINE: 0.2 EU/DL

## 2024-09-18 PROCEDURE — 90480 ADMN SARSCOV2 VAC 1/ONLY CMP: CPT | Performed by: FAMILY MEDICINE

## 2024-09-18 PROCEDURE — 90656 IIV3 VACC NO PRSV 0.5 ML IM: CPT | Performed by: FAMILY MEDICINE

## 2024-09-18 PROCEDURE — 81002 URINALYSIS NONAUTO W/O SCOPE: CPT | Performed by: FAMILY MEDICINE

## 2024-09-18 PROCEDURE — 99213 OFFICE O/P EST LOW 20 MIN: CPT | Performed by: FAMILY MEDICINE

## 2024-09-18 PROCEDURE — G0008 ADMIN INFLUENZA VIRUS VAC: HCPCS | Performed by: FAMILY MEDICINE

## 2024-09-18 PROCEDURE — 91320 SARSCV2 VAC 30MCG TRS-SUC IM: CPT | Performed by: FAMILY MEDICINE

## 2024-09-18 RX ORDER — DIAZEPAM 5 MG/1
5 TABLET ORAL EVERY 8 HOURS PRN
Qty: 60 TABLET | Refills: 0 | Status: SHIPPED | OUTPATIENT
Start: 2024-09-18 | End: 2024-09-22 | Stop reason: SDUPTHER

## 2024-09-18 ASSESSMENT — ENCOUNTER SYMPTOMS
HEADACHES: 0
ADENOPATHY: 0
APPETITE CHANGE: 0
FREQUENCY: 1
ABDOMINAL PAIN: 0
RECTAL PAIN: 0
DIARRHEA: 0
TROUBLE SWALLOWING: 0
POLYPHAGIA: 0
STRIDOR: 0
ACTIVITY CHANGE: 0
CHEST TIGHTNESS: 0
RHINORRHEA: 0
BLOOD IN STOOL: 0
FATIGUE: 0
SINUS PRESSURE: 0
FLANK PAIN: 0
CONFUSION: 0
ABDOMINAL DISTENTION: 0
CONSTITUTIONAL NEGATIVE: 1
DIZZINESS: 0
EYE PAIN: 0
DYSURIA: 0
DECREASED CONCENTRATION: 0
COLOR CHANGE: 0
MYALGIAS: 0
SEIZURES: 0
CONSTIPATION: 0
POLYDIPSIA: 0
SPEECH DIFFICULTY: 0
ARTHRALGIAS: 0
PHOTOPHOBIA: 0
FEVER: 0
AGITATION: 0
SORE THROAT: 0
SHORTNESS OF BREATH: 0
NECK STIFFNESS: 0
COUGH: 0
SINUS PAIN: 0
PALPITATIONS: 0
HEMATURIA: 0

## 2024-09-18 NOTE — PROGRESS NOTES
"Subjective   Patient ID: Leno Viveros is a 64 y.o. male who presents for UTI.    HPI   The patient is in office for possible UTI. The patient states he has been having accidents at night. The patient states he is not able to hold in his urine at all.     Review of Systems   Constitutional: Negative.  Negative for activity change, appetite change, fatigue and fever.   HENT:  Negative for congestion, dental problem, ear discharge, ear pain, mouth sores, rhinorrhea, sinus pressure, sinus pain, sore throat, tinnitus and trouble swallowing.    Eyes:  Negative for photophobia, pain and visual disturbance.   Respiratory:  Negative for cough, chest tightness, shortness of breath and stridor.    Cardiovascular:  Negative for chest pain and palpitations.   Gastrointestinal:  Negative for abdominal distention, abdominal pain, blood in stool, constipation, diarrhea and rectal pain.   Endocrine: Negative for cold intolerance, heat intolerance, polydipsia, polyphagia and polyuria.   Genitourinary:  Positive for frequency and urgency. Negative for dysuria, flank pain and hematuria.   Musculoskeletal:  Negative for arthralgias, gait problem, myalgias and neck stiffness.   Skin:  Negative for color change and rash.   Allergic/Immunologic: Negative for environmental allergies and food allergies.   Neurological:  Negative for dizziness, seizures, syncope, speech difficulty and headaches.   Hematological:  Negative for adenopathy.   Psychiatric/Behavioral:  Positive for dysphoric mood and sleep disturbance. Negative for agitation, confusion and decreased concentration. The patient is nervous/anxious.        Objective   /82 (BP Location: Right arm, Patient Position: Sitting, BP Cuff Size: Large adult)   Pulse 83   Temp 36.8 °C (98.3 °F) (Temporal)   Resp 16   Ht 1.854 m (6' 1\")   Wt (!) 154 kg (340 lb)   SpO2 97%   BMI 44.86 kg/m²     Physical Exam  Vitals reviewed.   Constitutional:       General: He is not in acute " distress.     Appearance: He is obese. He is not ill-appearing or diaphoretic.   HENT:      Head: Normocephalic.      Right Ear: Tympanic membrane and external ear normal.      Left Ear: Tympanic membrane and external ear normal.      Nose: Nose normal. No congestion.      Mouth/Throat:      Pharynx: No posterior oropharyngeal erythema.   Eyes:      General:         Right eye: No discharge.         Left eye: No discharge.      Extraocular Movements: Extraocular movements intact.      Conjunctiva/sclera: Conjunctivae normal.      Pupils: Pupils are equal, round, and reactive to light.   Cardiovascular:      Rate and Rhythm: Normal rate and regular rhythm.      Pulses: Normal pulses.      Heart sounds: Normal heart sounds. No murmur heard.  Pulmonary:      Effort: Pulmonary effort is normal. No respiratory distress.      Breath sounds: Normal breath sounds. No wheezing or rales.   Chest:      Chest wall: No tenderness.   Abdominal:      General: Bowel sounds are normal. There is distension.      Palpations: There is no mass.      Tenderness: There is no abdominal tenderness. There is no guarding.   Musculoskeletal:         General: No tenderness. Normal range of motion.      Cervical back: Normal range of motion and neck supple. No tenderness.      Right lower leg: No edema.      Left lower leg: No edema.   Skin:     General: Skin is dry.      Coloration: Skin is not jaundiced.      Findings: No bruising, erythema or rash.   Neurological:      General: No focal deficit present.      Mental Status: He is alert and oriented to person, place, and time. Mental status is at baseline.      Cranial Nerves: No cranial nerve deficit.      Sensory: No sensory deficit.      Coordination: Coordination normal.      Gait: Gait normal.   Psychiatric:         Thought Content: Thought content normal.         Judgment: Judgment normal.      Comments: Anxious         Assessment/Plan   Problem List Items Addressed This Visit              ICD-10-CM    Generalized anxiety disorder - Primary F41.1    Relevant Medications    diazePAM (Valium) 5 mg tablet    Nocturia R35.1     Other Visit Diagnoses         Codes    Acute cystitis with hematuria     N30.01    Urine frequency     R35.0    Relevant Orders    POCT UA (nonautomated) manually resulted (Completed)    Encounter for immunization     Z23    Relevant Orders    Flu vaccine, trivalent, preservative free, age 6 months and greater (Fluarix/Fluzone/Flulaval) (Completed)    Pfizer COVID-19 vaccine, monovalent, age 12 years and older (30 mcg/0.3 mL) (Comirnaty) (Completed)              Scribe Attestation  By signing my name below, I, HARMAN Gaston , Dianne   attest that this documentation has been prepared under the direction and in the presence of Wai Stevenson DO.   Provider Attestation - Scribe documentation    All medical record entries made by the Scribe were at my direction and personally dictated by me. I have reviewed the chart and agree that the record accurately reflects my personal performance of the history, physical exam, discussion and plan.

## 2024-09-18 NOTE — PATIENT INSTRUCTIONS
Follow up in 6 weeks    Continue current medications and therapy for chronic medical conditions.    Patient was advised importance of proper diet/nutrition in addition adequate hydration. Patient was encouraged moderate exercise program to include 30 minutes daily for 5 days of the week or 150 minutes weekly. Patient will follow-up with us as scheduled.    I personally reviewed the OARRS report for this patient. I have considered the risks of abuse, dependence, addiction, and diversion.    UDS/CSA: 01/23/2024    UA with CNS    Administer flu vaccine    Administer COVID-vaccine and    Start diazepam twice daily as needed

## 2024-09-19 NOTE — TELEPHONE ENCOUNTER
[id number] - [place], [place] - [address]  [address] [facility] [address]  Phone: [phone]   PT NEEDS MA TO PLEASE CALL PHARMACY TO CLARIFY A COUPLE THINGS WITH PRESCRIPTION.  diazePAM (Valium) 5 mg tablet

## 2024-09-22 RX ORDER — DIAZEPAM 5 MG/1
5 TABLET ORAL EVERY 8 HOURS PRN
Qty: 40 TABLET | Refills: 0 | Status: SHIPPED | OUTPATIENT
Start: 2024-09-22 | End: 2024-09-26 | Stop reason: SDUPTHER

## 2024-09-22 ASSESSMENT — ENCOUNTER SYMPTOMS
SLEEP DISTURBANCE: 1
DYSPHORIC MOOD: 1
NERVOUS/ANXIOUS: 1

## 2024-09-23 ENCOUNTER — TELEPHONE (OUTPATIENT)
Dept: PRIMARY CARE | Facility: CLINIC | Age: 64
End: 2024-09-23
Payer: MEDICARE

## 2024-09-23 DIAGNOSIS — F32.A DEPRESSION, UNSPECIFIED DEPRESSION TYPE: ICD-10-CM

## 2024-09-23 NOTE — TELEPHONE ENCOUNTER
Patient pharmacy has a question on the dosage  and direction of the diazepam    Patient stated he takes 3 times a day .     If someone can please give the  patient a call back

## 2024-09-23 NOTE — PROGRESS NOTES
Writer outreached pt regarding referral to [facility] services. Explained program and screened for SI/HI due to MA reporting concerns about pt's depression. Pt reports significant pain and concerns about running out of medication. Writer informed pt he would speak with PCP about medication. Pt requested to schedule future initial assessment. Pt is scheduled for 10/10/2024 @ 11am in person with pt's spouse.

## 2024-09-26 ENCOUNTER — TELEMEDICINE (OUTPATIENT)
Dept: PRIMARY CARE | Facility: CLINIC | Age: 64
End: 2024-09-26
Payer: MEDICARE

## 2024-09-26 DIAGNOSIS — F41.1 GENERALIZED ANXIETY DISORDER: Primary | ICD-10-CM

## 2024-09-26 DIAGNOSIS — I10 PRIMARY HYPERTENSION: ICD-10-CM

## 2024-09-26 DIAGNOSIS — F31.62 BIPOLAR DISORDER, CURRENT EPISODE MIXED, MODERATE (MULTI): ICD-10-CM

## 2024-09-26 DIAGNOSIS — J41.8 MIXED SIMPLE AND MUCOPURULENT CHRONIC BRONCHITIS (MULTI): ICD-10-CM

## 2024-09-26 PROCEDURE — 99442 PR PHYS/QHP TELEPHONE EVALUATION 11-20 MIN: CPT | Performed by: FAMILY MEDICINE

## 2024-09-26 NOTE — PROGRESS NOTES
Subjective   Patient ID: Leno Viveros is a 64 y.o. male who presents for No chief complaint on file..    Consent obtained by Leno Viveros for audio communication. Total time for this audio communication visit is 12 minutes.     HPI   Patient states he is taking diazepam 5 mg three times daily to help him function throughout the day and sleep at night.  Patient discontinued cholesterol and hypertension medications, states he is doing well.     Review of Systems   Constitutional: Negative.  Negative for activity change, appetite change, fatigue and fever.   HENT:  Negative for congestion, dental problem, ear discharge, ear pain, mouth sores, rhinorrhea, sinus pressure, sinus pain, sore throat, tinnitus and trouble swallowing.    Eyes:  Negative for photophobia, pain and visual disturbance.   Respiratory:  Negative for cough, chest tightness, shortness of breath and stridor.    Cardiovascular:  Negative for chest pain and palpitations.   Gastrointestinal:  Negative for abdominal distention, abdominal pain, blood in stool, constipation, diarrhea and rectal pain.   Endocrine: Negative for cold intolerance, heat intolerance, polydipsia, polyphagia and polyuria.   Genitourinary:  Negative for dysuria, flank pain, hematuria and urgency.   Musculoskeletal:  Negative for arthralgias, gait problem, myalgias and neck stiffness.   Skin:  Negative for color change and rash.   Allergic/Immunologic: Negative for environmental allergies and food allergies.   Neurological:  Negative for dizziness, seizures, syncope, speech difficulty and headaches.   Hematological:  Negative for adenopathy.   Psychiatric/Behavioral:  Positive for dysphoric mood and sleep disturbance. Negative for agitation, confusion and decreased concentration. The patient is nervous/anxious.        Objective   There were no vitals taken for this visit.    Physical Exam  Constitutional:       Appearance: He is obese.   Neurological:      Mental Status: He is  alert and oriented to person, place, and time.   Psychiatric:      Comments: Anxious       Constitutional: Well developed, well nourished, alert and in no acute distress   Psychiatric: Mood calm and affect normal    Telephone Visit - Audio Communication Only     Assessment/Plan   Problem List Items Addressed This Visit             ICD-10-CM    COPD (chronic obstructive pulmonary disease) (Multi) J44.9    Generalized anxiety disorder - Primary F41.1    Relevant Medications    diazePAM (Valium) 5 mg tablet    Bipolar disorder, current episode mixed, moderate (Multi) F31.62    Relevant Medications    carBAMazepine (CarbatroL) 100 mg 12 hr capsule    Hypertension I10    BMI 40.0-44.9, adult (Multi) Z68.41     Consent obtained by Leno Viveros for audio communication. Total time for this audio communication visit is 12 minutes.      Provider Attestation - Scribe documentation    All medical record entries made by the Scribe were at my direction and personally dictated by me. I have reviewed the chart and agree that the record accurately reflects my personal performance of the history, physical exam, discussion and plan.

## 2024-09-26 NOTE — PATIENT INSTRUCTIONS
Follow up in    Continue current medications and therapy for chronic medical conditions.    Patient was advised importance of proper diet/nutrition in addition adequate hydration. Patient was encouraged moderate exercise program to include 30 minutes daily for 5 days of the week or 150 minutes weekly. Patient will follow-up with us as scheduled.    I personally reviewed the OARRS report for this patient. I have considered the risks of abuse, dependence, addiction, and diversion.    UDS/CSA: 01/23/2024    Continue diazepam 5 mg 3 times daily as needed

## 2024-09-27 RX ORDER — DIAZEPAM 5 MG/1
5 TABLET ORAL EVERY 8 HOURS
Qty: 90 TABLET | Refills: 0 | Status: SHIPPED | OUTPATIENT
Start: 2024-09-27

## 2024-09-27 RX ORDER — CARBAMAZEPINE 100 MG/1
100 CAPSULE, EXTENDED RELEASE ORAL 2 TIMES DAILY
Qty: 60 CAPSULE | Refills: 2 | Status: SHIPPED | OUTPATIENT
Start: 2024-09-27 | End: 2024-12-26

## 2024-09-27 ASSESSMENT — ENCOUNTER SYMPTOMS
NECK STIFFNESS: 0
NERVOUS/ANXIOUS: 1
DYSPHORIC MOOD: 1
CHEST TIGHTNESS: 0
ACTIVITY CHANGE: 0
RHINORRHEA: 0
ADENOPATHY: 0
DECREASED CONCENTRATION: 0
SEIZURES: 0
STRIDOR: 0
APPETITE CHANGE: 0
CONSTITUTIONAL NEGATIVE: 1
SORE THROAT: 0
PALPITATIONS: 0
HEMATURIA: 0
COLOR CHANGE: 0
DIARRHEA: 0
EYE PAIN: 0
POLYPHAGIA: 0
CONSTIPATION: 0
ABDOMINAL DISTENTION: 0
ABDOMINAL PAIN: 0
CONFUSION: 0
COUGH: 0
DYSURIA: 0
SINUS PRESSURE: 0
MYALGIAS: 0
RECTAL PAIN: 0
HEADACHES: 0
SPEECH DIFFICULTY: 0
FATIGUE: 0
BLOOD IN STOOL: 0
DIZZINESS: 0
FEVER: 0
SHORTNESS OF BREATH: 0
ARTHRALGIAS: 0
FLANK PAIN: 0
SLEEP DISTURBANCE: 1
POLYDIPSIA: 0
AGITATION: 0
TROUBLE SWALLOWING: 0
SINUS PAIN: 0
PHOTOPHOBIA: 0

## 2024-09-30 ENCOUNTER — HOSPITAL ENCOUNTER (OUTPATIENT)
Dept: CARDIOLOGY | Facility: CLINIC | Age: 64
Discharge: HOME | End: 2024-09-30
Payer: MEDICARE

## 2024-09-30 VITALS — WEIGHT: 315 LBS | BODY MASS INDEX: 41.75 KG/M2 | HEIGHT: 73 IN

## 2024-09-30 DIAGNOSIS — Z95.2 PRESENCE OF PROSTHETIC HEART VALVE: ICD-10-CM

## 2024-09-30 PROCEDURE — 93306 TTE W/DOPPLER COMPLETE: CPT

## 2024-09-30 PROCEDURE — 93306 TTE W/DOPPLER COMPLETE: CPT | Performed by: INTERNAL MEDICINE

## 2024-09-30 PROCEDURE — 2500000004 HC RX 250 GENERAL PHARMACY W/ HCPCS (ALT 636 FOR OP/ED): Performed by: INTERNAL MEDICINE

## 2024-10-01 LAB
AORTIC VALVE MEAN GRADIENT: 6 MMHG
AORTIC VALVE PEAK VELOCITY: 1.57 M/S
AV PEAK GRADIENT: 9.9 MMHG
AVA (PEAK VEL): 3.18 CM2
AVA (VTI): 3.08 CM2
EJECTION FRACTION APICAL 4 CHAMBER: 63.5
EJECTION FRACTION: 60 %
LEFT VENTRICLE INTERNAL DIMENSION DIASTOLE: 4.7 CM (ref 3.5–6)
LEFT VENTRICULAR OUTFLOW TRACT DIAMETER: 2.3 CM
LV EJECTION FRACTION BIPLANE: 66 %
MITRAL VALVE E/A RATIO: 0.7
MITRAL VALVE E/E' RATIO: 6.1
RIGHT VENTRICLE PEAK SYSTOLIC PRESSURE: 25.5 MMHG

## 2024-10-04 ENCOUNTER — APPOINTMENT (OUTPATIENT)
Dept: CARDIOLOGY | Facility: CLINIC | Age: 64
End: 2024-10-04
Payer: MEDICARE

## 2024-10-04 ENCOUNTER — PROCEDURE VISIT (OUTPATIENT)
Dept: UROLOGY | Facility: CLINIC | Age: 64
End: 2024-10-04
Payer: MEDICARE

## 2024-10-04 VITALS
BODY MASS INDEX: 44.39 KG/M2 | SYSTOLIC BLOOD PRESSURE: 155 MMHG | HEART RATE: 88 BPM | WEIGHT: 315 LBS | DIASTOLIC BLOOD PRESSURE: 81 MMHG | RESPIRATION RATE: 18 BRPM

## 2024-10-04 VITALS
HEART RATE: 64 BPM | BODY MASS INDEX: 44.67 KG/M2 | SYSTOLIC BLOOD PRESSURE: 110 MMHG | DIASTOLIC BLOOD PRESSURE: 80 MMHG | WEIGHT: 315 LBS

## 2024-10-04 DIAGNOSIS — Z87.891 FORMER SMOKER: ICD-10-CM

## 2024-10-04 DIAGNOSIS — I45.2 RIGHT BUNDLE BRANCH BLOCK (RBBB) WITH LEFT POSTERIOR FASCICULAR BLOCK: ICD-10-CM

## 2024-10-04 DIAGNOSIS — N40.0 BENIGN PROSTATIC HYPERPLASIA, UNSPECIFIED WHETHER LOWER URINARY TRACT SYMPTOMS PRESENT: ICD-10-CM

## 2024-10-04 DIAGNOSIS — R33.9 URINARY RETENTION: Primary | ICD-10-CM

## 2024-10-04 DIAGNOSIS — Z95.2 AORTIC VALVE REPLACED: ICD-10-CM

## 2024-10-04 DIAGNOSIS — F32.A DEPRESSION, UNSPECIFIED DEPRESSION TYPE: ICD-10-CM

## 2024-10-04 DIAGNOSIS — I10 PRIMARY HYPERTENSION: ICD-10-CM

## 2024-10-04 DIAGNOSIS — E78.2 MIXED HYPERLIPIDEMIA: ICD-10-CM

## 2024-10-04 DIAGNOSIS — I65.23 BILATERAL CAROTID ARTERY STENOSIS: ICD-10-CM

## 2024-10-04 PROCEDURE — 1036F TOBACCO NON-USER: CPT | Performed by: INTERNAL MEDICINE

## 2024-10-04 PROCEDURE — 52000 CYSTOURETHROSCOPY: CPT | Performed by: UROLOGY

## 2024-10-04 PROCEDURE — 99214 OFFICE O/P EST MOD 30 MIN: CPT | Performed by: INTERNAL MEDICINE

## 2024-10-04 PROCEDURE — 3074F SYST BP LT 130 MM HG: CPT | Performed by: INTERNAL MEDICINE

## 2024-10-04 PROCEDURE — 81003 URINALYSIS AUTO W/O SCOPE: CPT | Performed by: UROLOGY

## 2024-10-04 PROCEDURE — 3079F DIAST BP 80-89 MM HG: CPT | Performed by: INTERNAL MEDICINE

## 2024-10-04 PROCEDURE — 99214 OFFICE O/P EST MOD 30 MIN: CPT | Performed by: UROLOGY

## 2024-10-04 PROCEDURE — 51798 US URINE CAPACITY MEASURE: CPT | Performed by: UROLOGY

## 2024-10-04 RX ORDER — TAMSULOSIN HYDROCHLORIDE 0.4 MG/1
0.4 CAPSULE ORAL DAILY
COMMUNITY

## 2024-10-04 NOTE — PATIENT INSTRUCTIONS
It was very nice to see you again today.  We discussed your previous urinary tract issues including a urinary tract infection.  You did find that Flomax was of benefit, however, you have decided to independently discontinue that medication.  Recently, you called stating that you were starting to have the same symptoms.  We recommended restarting the Flomax and reevaluation.  Today, your cystoscopy and flow studies does not identify any new problems and we recommend staying on Flomax.  You have noticed significant improvement once you initiated the medication again.  I will see you again in the spring      This note was created with voice-recognition software and was not corrected for typographical or grammatical errors.

## 2024-10-04 NOTE — PATIENT INSTRUCTIONS
Echocardiogram to be done in 1 year prior to your next visit  Follow up office visit in 1 year.    Continue same medications/treatment.  Patient educated on proper medication use.  Patient educated on risk factor modification.  Please bring any lab results from other providers / physicians to your next appointment.    Please bring all medicines, vitamins and herbal supplements with you when you come to the office.    Prescriptions will not be filled unless you are compliant with your follow up appointments or have a follow up  appointment scheduled as per instruction of your physician.  Refills should be requested at the time of  Your visit.    Rita WILSON LPN, am scribing for and in the presence of Dr. Jr Espinosa MD, FACC

## 2024-10-04 NOTE — PROGRESS NOTES
"Patient ID: Leno Billings is a 64 y.o. male.  Pt denies any pain today. States has not had any recent hospital admits or surgeries since their last visit. Denies any concerns about falling or safety. TAMEKA      Procedures  Pt took macrodantin 50mg po as prescribed  Anesthesia: Local 2% Lidocaine  Instruments: 6F flexible disposable cystoscope  Pt brought to procedure room and placed in supine lithotomy position. Pt draped and prepped in normal sterile fashion. 10ml lidocaine instilled into urethral meatus. Pt tolerated well    I was present as chaperone for the entirety of the procedure   Jacqueline Clark  Cystoscopy performed by Dr. David Woodard  Bedside \"Time Out\" Verification   Today's Date: 10/04/2024 . I attest that this time out verification took place prior to the procedure.   Procedure: cysto   RN/LPN/MA:TAMEKA   Provider: [facility].   Verified By: RN/LPN/MA,  TAMEKA and Provider.   Prior to the start of the procedure a time out was taken and the following were verified: the identity of the patient using two patient identifiers, the correct procedure, the correct site marked as indicated, the correct positioning for the patient and the correct equipment was obtained.   Cystoscopy - female MALE LENO BILLINGS identified using two (2) forms of identification.   Procedure: diagnostic cystourethroscopy.  Procedure Note: Time Started:  Time Completed: STARTED: 2:00PM COMPLETED: 2:25 PM  Indications for procedure: irritable voiding symptoms.   Discussed with patient: Risks, benefits, and alternative were discussed in detail. Patient appears to understand and agrees to proceed. Patient has signed the procedure consent form.    CYSTOSCOPY:    Cystoscopy today reveals a normal distal urethra and external sphincter.  Prostatic urethra is moderately obstructed and the median lobe is mildly elevated.  Once inside the bladder, no evidence of stones or tumors.  PVR 18 cc.    "

## 2024-10-04 NOTE — LETTER
"October 4, 2024     Wai Stevenson DO  [address] [facility] [place] [address]    Patient: Leno Viveros   YOB: 1960   Date of Visit: 10/4/2024       Dear Dr. Wai Stevenson DO:    Thank you for referring Leno Viveros to me for evaluation. Below are my notes for this consultation.  If you have questions, please do not hesitate to call me. I look forward to following your patient along with you.       Sincerely,     David Woodard MD      CC: No Recipients  ______________________________________________________________________________________            64 year-old white male referred by Dr. Wai Stevenson for \"retention, BPH with obstruction\".  Last seen by Dr. Hector Martinez, urologist, on Monika 15, 2023.  BMI 42.7.  Presently on Flomax.  Patient is disabled, secondary to psychiatric issues.  Previously employed in the beverage distribution industry.  No family history of prostate or breast cancer.  Very minimal cigarette smoking history.     February 27, 2024, patient arrives alone.  He states that he has no urinary complaints.  He independently discontinued Flomax.  Urine cytology was negative for malignant cells.  Urine culture no growth.  Urinalysis was negative for blood.  PSA is normal at 2.60.  Renal ultrasound was \"unremarkable\".  He will return in 1 year.    September 18, 2024, telephone call, patient states that he is experiencing urinary incontinence again.  I recommended restarting Flomax and reevaluation with a cystoscopy with flow studies.  He agreed.    October 4, 2024, cystoscopy, flow studies and a discussion of treatment options.  Moderately obstructed prostatic urethra with mildly elevated median lobe and prostatic varices.  No evidence of stones or tumors.  PVR 18 cc.  He is being well-managed on Flomax without any further incontinence or nocturia.  Therefore, I will not add any medication to his regimen at this time.  He will keep his February 24, 2025 return " appointment.     PLAN:     1.  Patient will return on February 24, 2025 with PVR, urinalysis, urine culture and PSA.     2.  Flomax 0.4 mg p.o. daily     Physical Exam  Vitals and nursing note reviewed.   Constitutional:       Appearance: Normal appearance.   HENT:      Head: Normocephalic and atraumatic.   Pulmonary:      Effort: Pulmonary effort is normal.   Abdominal:      Palpations: Abdomen is soft.      Tenderness: There is no abdominal tenderness.   Musculoskeletal:         General: Normal range of motion.      Cervical back: Normal range of motion and neck supple.   Neurological:      General: No focal deficit present.      Mental Status: He is alert and oriented to person, place, and time.   Psychiatric:         Mood and Affect: Mood normal.         Behavior: Behavior normal.        This note was created with voice-recognition software and was not corrected for typographical or grammatical errors.

## 2024-10-04 NOTE — PROGRESS NOTES
CARDIOLOGY OFFICE VISIT      CHIEF COMPLAINT      HISTORY OF PRESENT ILLNESS  The patient states she has been doing well from a cardiac standpoint.  He denies chest discomfort or symptoms of myocardial ischemia.  He denies dyspnea with exertion.  He denies pretibial edema.  He denies palpitations and syncope.  I did go over his echocardiogram with him.  This demonstrates normal left ventricular systolic function, status post aortic valve replacement with satisfactory result, moderate dilatation of the aortic root and ascending aorta.      Impression:  Status post aortic valve replacement at [facility] many years ago, probable tissue valve  Incomplete right bundle branch block  Hyperlipidemia  Minimal carotid artery disease bilaterally  Former smoker  Morbid obesity  Hypertension  Depression  Moderate dilatation of aortic root and ascending thoracic aorta        Please excuse any errors in grammar or translation related to this dictation. Voice recognition software was utilized to prepare this document.             Past Medical History  Past Medical History:   Diagnosis Date    Aneurysm of the ascending aorta, without rupture ([facility]) 04/27/2017    Aneurysm, ascending aorta    Anxiety disorder, unspecified 04/27/2017    Anxiety    Bipolar disorder, unspecified (Multi) 03/04/2017    Bipolar 1 disorder    Body mass index (BMI)40.0-44.9, adult 05/20/2020    Body mass index (BMI) of 40.0 to 44.9 in adult    Body mass index (BMI)40.0-44.9, adult 01/06/2021    Body mass index (BMI) of 40.0 to 44.9 in adult    Encounter for general adult medical examination without abnormal findings 10/07/2020    Encounter for Medicare annual wellness exam    Encounter for screening for malignant neoplasm of prostate 10/07/2020    Prostate cancer screening    Encounter for screening for malignant neoplasm of prostate 10/07/2020    Encounter for prostate cancer screening    Nonrheumatic aortic (valve) stenosis 04/27/2017     Aortic stenosis, moderate    Other obesity due to excess calories     Obesity due to excess calories    Personal history of other diseases of the circulatory system     History of right bundle branch block (RBBB)    Personal history of other diseases of the digestive system     History of irritable bowel syndrome    Personal history of other diseases of the digestive system     History of constipation    Personal history of other endocrine, nutritional and metabolic disease     History of hyperlipidemia, mixed    Personal history of other medical treatment 2020    History of dental examination    Personal history of other specified conditions     History of shortness of breath    Personal history of other specified conditions 2021    History of dizziness    Presence of other heart-valve replacement 10/04/2019    Status post combined aortic root and valve replacement using stentless bioprosthetic aortic valve    Pruritus, unspecified 2021    Itching of ear    Unspecified chronic otitis externa, unspecified ear 11/10/2021    Chronic otitis externa       Social History  Social History     Tobacco Use    Smoking status: Former     Current packs/day: 0.00     Average packs/day: 0.3 packs/day for 1 year (0.3 ttl pk-yrs)     Types: Cigarettes     Start date:      Quit date:      Years since quittin.7     Passive exposure: Never    Smokeless tobacco: Never   Substance Use Topics    Alcohol use: Not Currently    Drug use: Never       Family History     Family History   Problem Relation Name Age of Onset    Heart disease Mother      Diabetes Mother      Cancer Mother      Hypertension Father      Heart disease Father      Diabetes Father          Allergies:  Allergies   Allergen Reactions    Oxcarbazepine Unknown    Quetiapine Unknown        Outpatient Medications:  Current Outpatient Medications   Medication Instructions    buPROPion SR (WELLBUTRIN SR) 150 mg, oral, 2 times daily, Do not crush,  chew, or split.    carBAMazepine (CARBATROL) 100 mg, oral, 2 times daily, Do not crush or chew.    cyanocobalamin (VITAMIN B-12) 250 mcg, oral, Daily    diazePAM (VALIUM) 5 mg, oral, Every 8 hours    tamsulosin (FLOMAX) 0.4 mg, oral, Daily          REVIEW OF SYSTEMS  Review of Systems   All other systems reviewed and are negative.        VITALS  Vitals:    10/04/24 1024   BP: 110/80   Pulse: 64       PHYSICAL EXAM  Constitutional:       Appearance: Healthy appearance. Not in distress.   Eyes:      Conjunctiva/sclera: Conjunctivae normal.      Pupils: Pupils are equal, round, and reactive to light.   Neck:      Vascular: No JVR. JVD normal.   Pulmonary:      Effort: Pulmonary effort is normal.      Breath sounds: Normal breath sounds. No wheezing. No rhonchi. No rales.   Chest:      Chest wall: Not tender to palpatation.   Cardiovascular:      PMI at left midclavicular line. Normal rate. Regular rhythm. Normal S1. Normal S2.       Murmurs: There is no murmur.      No gallop.  No click. No rub.   Pulses:     Intact distal pulses.   Edema:     Peripheral edema absent.   Abdominal:      Tenderness: There is no abdominal tenderness.   Musculoskeletal: Normal range of motion.         General: No tenderness.      Cervical back: Normal range of motion. Skin:     General: Skin is warm and dry.   Neurological:      General: No focal deficit present.      Mental Status: Alert and oriented to person, place and time.           ASSESSMENT AND PLAN  Diagnoses and all orders for this visit:  Aortic valve replaced  Right bundle branch block (RBBB) with left posterior fascicular block  Mixed hyperlipidemia  Bilateral carotid artery stenosis  Primary hypertension  BMI 40.0-44.9, adult (Multi)  Depression, unspecified depression type  Former smoker      [unfilled]   This was a shared visit with the LESLY. I reviewed and verified the documentation.

## 2024-10-04 NOTE — PROGRESS NOTES
"        64 year-old white male referred by Dr. Wai Stevenson for \"retention, BPH with obstruction\".  Last seen by Dr. Hector Martinez, urologist, on Monika 15, 2023.  BMI 42.7.  Presently on Flomax.  Patient is disabled, secondary to psychiatric issues.  Previously employed in the beverage distribution industry.  No family history of prostate or breast cancer.  Very minimal cigarette smoking history.     February 27, 2024, patient arrives alone.  He states that he has no urinary complaints.  He independently discontinued Flomax.  Urine cytology was negative for malignant cells.  Urine culture no growth.  Urinalysis was negative for blood.  PSA is normal at 2.60.  Renal ultrasound was \"unremarkable\".  He will return in 1 year.    September 18, 2024, telephone call, patient states that he is experiencing urinary incontinence again.  I recommended restarting Flomax and reevaluation with a cystoscopy with flow studies.  He agreed.    October 4, 2024, cystoscopy, flow studies and a discussion of treatment options.  Moderately obstructed prostatic urethra with mildly elevated median lobe and prostatic varices.  No evidence of stones or tumors.  PVR 18 cc.  He is being well-managed on Flomax without any further incontinence or nocturia.  Therefore, I will not add any medication to his regimen at this time.  He will keep his February 24, 2025 return appointment.     PLAN:     1.  Patient will return on February 24, 2025 with PVR, urinalysis, urine culture and PSA.     2.  Flomax 0.4 mg p.o. daily     Physical Exam  Vitals and nursing note reviewed.   Constitutional:       Appearance: Normal appearance.   HENT:      Head: Normocephalic and atraumatic.   Pulmonary:      Effort: Pulmonary effort is normal.   Abdominal:      Palpations: Abdomen is soft.      Tenderness: There is no abdominal tenderness.   Musculoskeletal:         General: Normal range of motion.      Cervical back: Normal range of motion and neck supple. "   Neurological:      General: No focal deficit present.      Mental Status: He is alert and oriented to person, place, and time.   Psychiatric:         Mood and Affect: Mood normal.         Behavior: Behavior normal.        This note was created with voice-recognition software and was not corrected for typographical or grammatical errors.

## 2024-10-10 ENCOUNTER — APPOINTMENT (OUTPATIENT)
Dept: PRIMARY CARE | Facility: CLINIC | Age: 64
End: 2024-10-10
Payer: MEDICARE

## 2024-10-10 ENCOUNTER — DOCUMENTATION (OUTPATIENT)
Dept: BEHAVIORAL HEALTH | Facility: CLINIC | Age: 64
End: 2024-10-10

## 2024-10-10 ENCOUNTER — SOCIAL WORK (OUTPATIENT)
Dept: PRIMARY CARE | Facility: CLINIC | Age: 64
End: 2024-10-10

## 2024-10-10 ASSESSMENT — PATIENT HEALTH QUESTIONNAIRE - PHQ9
9. THOUGHTS THAT YOU WOULD BE BETTER OFF DEAD, OR OF HURTING YOURSELF: SEVERAL DAYS
6. FEELING BAD ABOUT YOURSELF - OR THAT YOU ARE A FAILURE OR HAVE LET YOURSELF OR YOUR FAMILY DOWN: NOT AT ALL
5. POOR APPETITE OR OVEREATING: NOT AT ALL
1. LITTLE INTEREST OR PLEASURE IN DOING THINGS: NOT AT ALL
4. FEELING TIRED OR HAVING LITTLE ENERGY: NEARLY EVERY DAY
8. MOVING OR SPEAKING SO SLOWLY THAT OTHER PEOPLE COULD HAVE NOTICED. OR THE OPPOSITE, BEING SO FIGETY OR RESTLESS THAT YOU HAVE BEEN MOVING AROUND A LOT MORE THAN USUAL: NOT AT ALL
7. TROUBLE CONCENTRATING ON THINGS, SUCH AS READING THE NEWSPAPER OR WATCHING TELEVISION: NOT AT ALL
2. FEELING DOWN, DEPRESSED OR HOPELESS: NOT AT ALL
SUM OF ALL RESPONSES TO PHQ9 QUESTIONS 1 & 2: 0
3. TROUBLE FALLING OR STAYING ASLEEP: NEARLY EVERY DAY
SUM OF ALL RESPONSES TO PHQ QUESTIONS 1-9: 7

## 2024-10-10 ASSESSMENT — ANXIETY QUESTIONNAIRES
2. NOT BEING ABLE TO STOP OR CONTROL WORRYING: SEVERAL DAYS
IF YOU CHECKED OFF ANY PROBLEMS ON THIS QUESTIONNAIRE, HOW DIFFICULT HAVE THESE PROBLEMS MADE IT FOR YOU TO DO YOUR WORK, TAKE CARE OF THINGS AT HOME, OR GET ALONG WITH OTHER PEOPLE: SOMEWHAT DIFFICULT
1. FEELING NERVOUS, ANXIOUS, OR ON EDGE: SEVERAL DAYS
4. TROUBLE RELAXING: NOT AT ALL
5. BEING SO RESTLESS THAT IT IS HARD TO SIT STILL: NOT AT ALL
3. WORRYING TOO MUCH ABOUT DIFFERENT THINGS: SEVERAL DAYS
7. FEELING AFRAID AS IF SOMETHING AWFUL MIGHT HAPPEN: SEVERAL DAYS
GAD7 TOTAL SCORE: 5
6. BECOMING EASILY ANNOYED OR IRRITABLE: SEVERAL DAYS
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
## 2024-10-31 ENCOUNTER — APPOINTMENT (OUTPATIENT)
Dept: PRIMARY CARE | Facility: CLINIC | Age: 64
End: 2024-10-31
Payer: MEDICARE

## 2024-10-31 ENCOUNTER — DOCUMENTATION (OUTPATIENT)
Dept: PRIMARY CARE | Facility: CLINIC | Age: 64
End: 2024-10-31

## 2024-10-31 VITALS
OXYGEN SATURATION: 97 % | BODY MASS INDEX: 41.75 KG/M2 | HEIGHT: 73 IN | DIASTOLIC BLOOD PRESSURE: 84 MMHG | RESPIRATION RATE: 18 BRPM | HEART RATE: 78 BPM | SYSTOLIC BLOOD PRESSURE: 128 MMHG | WEIGHT: 315 LBS | TEMPERATURE: 96.1 F

## 2024-10-31 DIAGNOSIS — F31.62 BIPOLAR DISORDER, CURRENT EPISODE MIXED, MODERATE (MULTI): Primary | ICD-10-CM

## 2024-10-31 DIAGNOSIS — F41.1 GENERALIZED ANXIETY DISORDER: ICD-10-CM

## 2024-10-31 DIAGNOSIS — Z79.899 MEDICATION MANAGEMENT: ICD-10-CM

## 2024-10-31 PROCEDURE — G2211 COMPLEX E/M VISIT ADD ON: HCPCS | Performed by: FAMILY MEDICINE

## 2024-10-31 PROCEDURE — 80365 DRUG SCREENING OXYCODONE: CPT

## 2024-10-31 PROCEDURE — 99492 1ST PSYC COLLAB CARE MGMT: CPT | Performed by: FAMILY MEDICINE

## 2024-10-31 PROCEDURE — 80358 DRUG SCREENING METHADONE: CPT

## 2024-10-31 PROCEDURE — 82570 ASSAY OF URINE CREATININE: CPT

## 2024-10-31 PROCEDURE — 80361 OPIATES 1 OR MORE: CPT

## 2024-10-31 PROCEDURE — 80373 DRUG SCREENING TRAMADOL: CPT

## 2024-10-31 PROCEDURE — 99494 1ST/SBSQ PSYC COLLAB CARE: CPT | Performed by: FAMILY MEDICINE

## 2024-10-31 PROCEDURE — 80346 BENZODIAZEPINES1-12: CPT

## 2024-10-31 PROCEDURE — 80307 DRUG TEST PRSMV CHEM ANLYZR: CPT

## 2024-10-31 PROCEDURE — 80368 SEDATIVE HYPNOTICS: CPT

## 2024-10-31 PROCEDURE — 99213 OFFICE O/P EST LOW 20 MIN: CPT | Performed by: FAMILY MEDICINE

## 2024-10-31 PROCEDURE — 80354 DRUG SCREENING FENTANYL: CPT

## 2024-10-31 RX ORDER — DIAZEPAM 5 MG/1
5 TABLET ORAL EVERY 8 HOURS
Qty: 90 TABLET | Refills: 0 | Status: SHIPPED | OUTPATIENT
Start: 2024-10-31

## 2024-10-31 ASSESSMENT — ENCOUNTER SYMPTOMS
HEADACHES: 0
HYPERTENSION: 1
BLURRED VISION: 0

## 2024-11-01 LAB
AMPHETAMINES UR QL SCN: NORMAL
BARBITURATES UR QL SCN: NORMAL
BZE UR QL SCN: NORMAL
CANNABINOIDS UR QL SCN: NORMAL
CREAT UR-MCNC: 114.3 MG/DL (ref 20–370)
PCP UR QL SCN: NORMAL

## 2024-11-03 ASSESSMENT — ENCOUNTER SYMPTOMS
SORE THROAT: 0
APPETITE CHANGE: 0
CONSTIPATION: 0
DYSPHORIC MOOD: 1
PALPITATIONS: 0
ABDOMINAL PAIN: 0
FATIGUE: 0
STRIDOR: 0
SINUS PAIN: 0
ARTHRALGIAS: 0
ADENOPATHY: 0
SPEECH DIFFICULTY: 0
ABDOMINAL DISTENTION: 0
FLANK PAIN: 0
RECTAL PAIN: 0
PHOTOPHOBIA: 0
DIZZINESS: 0
COUGH: 0
ACTIVITY CHANGE: 0
DIARRHEA: 0
DECREASED CONCENTRATION: 1
POLYPHAGIA: 0
NERVOUS/ANXIOUS: 1
COLOR CHANGE: 0
SHORTNESS OF BREATH: 0
CONSTITUTIONAL NEGATIVE: 1
TROUBLE SWALLOWING: 0
CHEST TIGHTNESS: 0
SINUS PRESSURE: 0
NECK STIFFNESS: 0
FEVER: 0
SEIZURES: 0
DYSURIA: 0
HEMATURIA: 0
EYE PAIN: 0
POLYDIPSIA: 0
AGITATION: 1
BLOOD IN STOOL: 0
SLEEP DISTURBANCE: 0
RHINORRHEA: 0
MYALGIAS: 0
CONFUSION: 0

## 2024-11-06 LAB
1OH-MIDAZOLAM UR CFM-MCNC: <25 NG/ML
6MAM UR CFM-MCNC: <25 NG/ML
7AMINOCLONAZEPAM UR CFM-MCNC: <25 NG/ML
A-OH ALPRAZ UR CFM-MCNC: <25 NG/ML
ALPRAZ UR CFM-MCNC: <25 NG/ML
CHLORDIAZEP UR CFM-MCNC: <25 NG/ML
CLONAZEPAM UR CFM-MCNC: <25 NG/ML
CODEINE UR CFM-MCNC: <50 NG/ML
DIAZEPAM UR CFM-MCNC: <25 NG/ML
EDDP UR CFM-MCNC: <25 NG/ML
FENTANYL UR CFM-MCNC: <2.5 NG/ML
HYDROCODONE CTO UR CFM-MCNC: <25 NG/ML
HYDROMORPHONE UR CFM-MCNC: <25 NG/ML
LORAZEPAM UR CFM-MCNC: <25 NG/ML
METHADONE UR CFM-MCNC: <25 NG/ML
MIDAZOLAM UR CFM-MCNC: <25 NG/ML
MORPHINE UR CFM-MCNC: <50 NG/ML
NORDIAZEPAM UR CFM-MCNC: 453 NG/ML
NORFENTANYL UR CFM-MCNC: <2.5 NG/ML
NORHYDROCODONE UR CFM-MCNC: <25 NG/ML
NOROXYCODONE UR CFM-MCNC: <25 NG/ML
NORTRAMADOL UR-MCNC: <50 NG/ML
OXAZEPAM UR CFM-MCNC: >1000 NG/ML
OXYCODONE UR CFM-MCNC: <25 NG/ML
OXYMORPHONE UR CFM-MCNC: <25 NG/ML
TEMAZEPAM UR CFM-MCNC: 937 NG/ML
TRAMADOL UR CFM-MCNC: <50 NG/ML
ZOLPIDEM UR CFM-MCNC: <25 NG/ML
ZOLPIDEM UR-MCNC: <25 NG/ML

## 2024-12-02 ENCOUNTER — DOCUMENTATION (OUTPATIENT)
Dept: PRIMARY CARE | Facility: CLINIC | Age: 64
End: 2024-12-02
Payer: MEDICARE

## 2024-12-20 DIAGNOSIS — F41.1 GENERALIZED ANXIETY DISORDER: ICD-10-CM

## 2024-12-20 DIAGNOSIS — F31.62 BIPOLAR DISORDER, CURRENT EPISODE MIXED, MODERATE (MULTI): ICD-10-CM

## 2024-12-20 RX ORDER — DIAZEPAM 5 MG/1
5 TABLET ORAL EVERY 8 HOURS
Qty: 90 TABLET | Refills: 0 | Status: SHIPPED | OUTPATIENT
Start: 2024-12-20

## 2024-12-20 RX ORDER — DIAZEPAM 5 MG/1
5 TABLET ORAL EVERY 8 HOURS
Qty: 90 TABLET | Refills: 0 | OUTPATIENT
Start: 2024-12-20

## 2024-12-20 NOTE — TELEPHONE ENCOUNTER
Dr. Stevenson patient  Refill for diazePAM (Valium) 5 mg tablet   [facility] on [address] in [place]     Says he will be out of medication after today.    Please advise, thank you.

## 2024-12-20 NOTE — TELEPHONE ENCOUNTER
Recent Visits  Date Type Provider Dept   10/31/24 Office Visit Wai Stevenson, DO [facility]   10/10/24 Appointment VIVI Walsh [facility]   09/18/24 Office Visit Wai Stevenson, DO [facility]   08/19/24 Office Visit Wai Stevenson, DO [facility]   Showing recent visits within past 180 days and meeting all other requirements  Future Appointments  Date Type Provider Dept   01/31/25 Appointment Wai Stevenson, DO [facility]   Showing future appointments within next 90 days and meeting all other requirements

## 2025-01-03 RX ORDER — CARBAMAZEPINE 100 MG/1
100 CAPSULE, EXTENDED RELEASE ORAL 2 TIMES DAILY
Qty: 60 CAPSULE | Refills: 2 | Status: SHIPPED | OUTPATIENT
Start: 2025-01-03 | End: 2025-04-03

## 2025-01-03 NOTE — TELEPHONE ENCOUNTER
[id number] - [place], [place] - [address]  [address] [facility], [place] [address]   Pt needs refill on  carBAMazepine (CarbatroL) 100 mg 12 hr capsule

## 2025-01-25 DIAGNOSIS — F41.1 GENERALIZED ANXIETY DISORDER: ICD-10-CM

## 2025-01-25 RX ORDER — DIAZEPAM 5 MG/1
5 TABLET ORAL EVERY 8 HOURS
Qty: 90 TABLET | Refills: 0 | Status: SHIPPED | OUTPATIENT
Start: 2025-01-25

## 2025-01-25 NOTE — TELEPHONE ENCOUNTER
Rx Refill Request     Name: Leno Viveros  :  1960   Medication Name:  diazepam 5mg tab   Specific Pharmacy location:  [facility] in [place] on [address]   Date of last appointment:  10/31/2024   Date of next appointment:  2025   Best number to reach patient:  [phone]

## 2025-01-31 ENCOUNTER — APPOINTMENT (OUTPATIENT)
Dept: PRIMARY CARE | Facility: CLINIC | Age: 65
End: 2025-01-31
Payer: MEDICARE

## 2025-01-31 VITALS
BODY MASS INDEX: 41.75 KG/M2 | WEIGHT: 315 LBS | TEMPERATURE: 97.7 F | DIASTOLIC BLOOD PRESSURE: 76 MMHG | HEIGHT: 73 IN | SYSTOLIC BLOOD PRESSURE: 118 MMHG | OXYGEN SATURATION: 95 % | RESPIRATION RATE: 19 BRPM | HEART RATE: 82 BPM

## 2025-01-31 DIAGNOSIS — I10 PRIMARY HYPERTENSION: Primary | ICD-10-CM

## 2025-01-31 DIAGNOSIS — F31.62 BIPOLAR DISORDER, CURRENT EPISODE MIXED, MODERATE (MULTI): ICD-10-CM

## 2025-01-31 DIAGNOSIS — Z12.5 PROSTATE CANCER SCREENING: ICD-10-CM

## 2025-01-31 DIAGNOSIS — E78.5 DYSLIPIDEMIA: ICD-10-CM

## 2025-01-31 DIAGNOSIS — J84.10 PULMONARY FIBROSIS, UNSPECIFIED (MULTI): ICD-10-CM

## 2025-01-31 PROCEDURE — 99213 OFFICE O/P EST LOW 20 MIN: CPT | Performed by: FAMILY MEDICINE

## 2025-01-31 PROCEDURE — G2211 COMPLEX E/M VISIT ADD ON: HCPCS | Performed by: FAMILY MEDICINE

## 2025-01-31 RX ORDER — TAMSULOSIN HYDROCHLORIDE 0.4 MG/1
0.4 CAPSULE ORAL DAILY
COMMUNITY

## 2025-01-31 ASSESSMENT — ENCOUNTER SYMPTOMS
DIARRHEA: 0
CHEST TIGHTNESS: 0
ACTIVITY CHANGE: 0
BLOOD IN STOOL: 0
POLYPHAGIA: 0
CONSTIPATION: 0
FATIGUE: 0
DYSURIA: 0
SINUS PRESSURE: 0
DIZZINESS: 0
HEMATURIA: 0
SEIZURES: 0
SPEECH DIFFICULTY: 0
COLOR CHANGE: 0
TROUBLE SWALLOWING: 0
SORE THROAT: 0
APPETITE CHANGE: 0
SINUS PAIN: 0
RECTAL PAIN: 0
HYPERTENSION: 1
AGITATION: 0
FLANK PAIN: 0
FEVER: 0
HEADACHES: 0
RHINORRHEA: 0
ABDOMINAL DISTENTION: 0
ADENOPATHY: 0
POLYDIPSIA: 0
SHORTNESS OF BREATH: 0
PHOTOPHOBIA: 0
CONSTITUTIONAL NEGATIVE: 1
EYE PAIN: 0
NECK STIFFNESS: 0
SLEEP DISTURBANCE: 0
ARTHRALGIAS: 0
COUGH: 0
MYALGIAS: 0
ABDOMINAL PAIN: 0
CONFUSION: 0
PALPITATIONS: 0
STRIDOR: 0

## 2025-01-31 NOTE — PROGRESS NOTES
Subjective   Patient ID: Leno Viveros is a 64 y.o. male who presents for Hypertension, Depression, and Anxiety.    Patient has complaints of nocturia. States he wakes up in the middle of the night with urinary urgency. States he also experiences dry mouth at night and is considering a humidifier.    Patient denies any other symptoms or concerns today.    Hypertension  This is a recurrent problem. The current episode started more than 1 year ago. The problem is unchanged. Pertinent negatives include no chest pain, headaches, palpitations or shortness of breath. There are no associated agents to hypertension. There are no known risk factors for coronary artery disease.        Review of Systems   Constitutional: Negative.  Negative for activity change, appetite change, fatigue and fever.   HENT:  Negative for congestion, dental problem, ear discharge, ear pain, mouth sores, rhinorrhea, sinus pressure, sinus pain, sore throat, tinnitus and trouble swallowing.    Eyes:  Negative for photophobia, pain and visual disturbance.   Respiratory:  Negative for cough, chest tightness, shortness of breath and stridor.    Cardiovascular:  Negative for chest pain and palpitations.   Gastrointestinal:  Negative for abdominal distention, abdominal pain, blood in stool, constipation, diarrhea and rectal pain.   Endocrine: Negative for cold intolerance, heat intolerance, polydipsia, polyphagia and polyuria.   Genitourinary:  Positive for urgency. Negative for dysuria, flank pain and hematuria.        Nocturia   Musculoskeletal:  Negative for arthralgias, gait problem, myalgias and neck stiffness.   Skin:  Negative for color change and rash.   Allergic/Immunologic: Negative for environmental allergies and food allergies.   Neurological:  Negative for dizziness, seizures, syncope, speech difficulty and headaches.   Hematological:  Negative for adenopathy.   Psychiatric/Behavioral:  Positive for decreased concentration and dysphoric  "mood. Negative for agitation, confusion and sleep disturbance. The patient is nervous/anxious.        Objective   /76 (BP Location: Right arm, Patient Position: Sitting, BP Cuff Size: Adult)   Pulse 82   Temp 36.5 °C (97.7 °F) (Skin)   Resp 19   Ht 1.854 m (6' 1\")   Wt (!) 156 kg (344 lb)   SpO2 95%   BMI 45.39 kg/m²     Physical Exam  Vitals reviewed.   Constitutional:       General: He is not in acute distress.     Appearance: Normal appearance. He is obese. He is not ill-appearing or diaphoretic.   HENT:      Head: Normocephalic.      Right Ear: Tympanic membrane and external ear normal.      Left Ear: Tympanic membrane and external ear normal.      Nose: Nose normal. No congestion.      Mouth/Throat:      Pharynx: No posterior oropharyngeal erythema.   Eyes:      General:         Right eye: No discharge.         Left eye: No discharge.      Extraocular Movements: Extraocular movements intact.      Conjunctiva/sclera: Conjunctivae normal.      Pupils: Pupils are equal, round, and reactive to light.   Cardiovascular:      Rate and Rhythm: Normal rate and regular rhythm.      Pulses: Normal pulses.      Heart sounds: Normal heart sounds. No murmur heard.  Pulmonary:      Effort: Pulmonary effort is normal. No respiratory distress.      Breath sounds: Normal breath sounds. No wheezing or rales.   Chest:      Chest wall: No tenderness.   Abdominal:      General: Abdomen is flat. Bowel sounds are normal. There is no distension.      Palpations: There is no mass.      Tenderness: There is no abdominal tenderness. There is no guarding.   Musculoskeletal:         General: No tenderness. Normal range of motion.      Cervical back: Normal range of motion and neck supple. No tenderness.      Right lower leg: No edema.      Left lower leg: No edema.   Skin:     General: Skin is dry.      Coloration: Skin is not jaundiced.      Findings: No bruising, erythema or rash.   Neurological:      General: No focal " deficit present.      Mental Status: He is alert and oriented to person, place, and time. Mental status is at baseline.      Cranial Nerves: No cranial nerve deficit.      Sensory: No sensory deficit.      Coordination: Coordination normal.      Gait: Gait normal.   Psychiatric:         Thought Content: Thought content normal.         Judgment: Judgment normal.      Comments: Anxious         Assessment/Plan   Problem List Items Addressed This Visit             ICD-10-CM    Dyslipidemia E78.5    Relevant Orders    Comprehensive Metabolic Panel    Lipid Panel    Hypertension - Primary I10     Other Visit Diagnoses         Codes    Prostate cancer screening     Z12.5    Relevant Orders    Prostate Specific Antigen, Screen    BMI 45.0-49.9, adult (Multi)     Z68.42    Relevant Medications    tirzepatide, weight loss, (Zepbound) 2.5 mg/0.5 mL injection          Scribe Attestation  By signing my name below, I, Pratima Levin MA , Scribe   attest that this documentation has been prepared under the direction and in the presence of Wai Stevenson DO.    Provider Attestation - Scribe documentation    All medical record entries made by the Scribe were at my direction and personally dictated by me. I have reviewed the chart and agree that the record accurately reflects my personal performance of the history, physical exam, discussion and plan.

## 2025-01-31 NOTE — PATIENT INSTRUCTIONS
Follow up in 3 months    Continue current medications and therapy for chronic medical conditions.    Patient was advised importance of proper diet/nutrition in addition adequate hydration. Patient was encouraged moderate exercise program to include 30 minutes daily for 5 days of the week or 150 minutes weekly. Patient will follow-up with us as scheduled.    I personally reviewed the OARRS report for this patient. I have considered the risks of abuse, dependence, addiction, and diversion.    UDS/CSA: 10/31/2024    Obtain fasting lipid, CMP, PSA labs    Start zepbound 2.5 mg weekly

## 2025-02-02 ASSESSMENT — ENCOUNTER SYMPTOMS
DYSPHORIC MOOD: 1
NERVOUS/ANXIOUS: 1
DECREASED CONCENTRATION: 1

## 2025-02-03 ENCOUNTER — TELEPHONE (OUTPATIENT)
Dept: PRIMARY CARE | Facility: CLINIC | Age: 65
End: 2025-02-03
Payer: MEDICARE

## 2025-02-21 LAB
ALBUMIN SERPL-MCNC: 4.6 G/DL (ref 3.6–5.1)
ALP SERPL-CCNC: 118 U/L (ref 35–144)
ALT SERPL-CCNC: 14 U/L (ref 9–46)
ANION GAP SERPL CALCULATED.4IONS-SCNC: 11 MMOL/L (CALC) (ref 7–17)
AST SERPL-CCNC: 13 U/L (ref 10–35)
BILIRUB SERPL-MCNC: 0.4 MG/DL (ref 0.2–1.2)
BUN SERPL-MCNC: 18 MG/DL (ref 7–25)
CALCIUM SERPL-MCNC: 9.2 MG/DL (ref 8.6–10.3)
CHLORIDE SERPL-SCNC: 105 MMOL/L (ref 98–110)
CHOLEST SERPL-MCNC: 224 MG/DL
CHOLEST/HDLC SERPL: 5.7 (CALC)
CO2 SERPL-SCNC: 25 MMOL/L (ref 20–32)
CREAT SERPL-MCNC: 1 MG/DL (ref 0.7–1.35)
EGFRCR SERPLBLD CKD-EPI 2021: 85 ML/MIN/1.73M2
GLUCOSE SERPL-MCNC: 104 MG/DL (ref 65–99)
HDLC SERPL-MCNC: 39 MG/DL
LDLC SERPL CALC-MCNC: 151 MG/DL (CALC)
NONHDLC SERPL-MCNC: 185 MG/DL (CALC)
POTASSIUM SERPL-SCNC: 4.6 MMOL/L (ref 3.5–5.3)
PROT SERPL-MCNC: 7.1 G/DL (ref 6.1–8.1)
PSA SERPL-MCNC: 1.3 NG/ML
SODIUM SERPL-SCNC: 141 MMOL/L (ref 135–146)
TRIGL SERPL-MCNC: 197 MG/DL

## 2025-02-24 ENCOUNTER — APPOINTMENT (OUTPATIENT)
Dept: UROLOGY | Facility: CLINIC | Age: 65
End: 2025-02-24
Payer: MEDICARE

## 2025-02-24 VITALS
SYSTOLIC BLOOD PRESSURE: 162 MMHG | DIASTOLIC BLOOD PRESSURE: 84 MMHG | BODY MASS INDEX: 41.75 KG/M2 | RESPIRATION RATE: 20 BRPM | HEIGHT: 73 IN | HEART RATE: 82 BPM | WEIGHT: 315 LBS

## 2025-02-24 DIAGNOSIS — R33.9 URINARY RETENTION: ICD-10-CM

## 2025-02-24 DIAGNOSIS — R35.1 NOCTURIA: ICD-10-CM

## 2025-02-24 DIAGNOSIS — N40.1 BENIGN PROSTATIC HYPERPLASIA WITH LOWER URINARY TRACT SYMPTOMS, SYMPTOM DETAILS UNSPECIFIED: Primary | ICD-10-CM

## 2025-02-24 PROCEDURE — 99213 OFFICE O/P EST LOW 20 MIN: CPT | Performed by: UROLOGY

## 2025-02-24 PROCEDURE — 3008F BODY MASS INDEX DOCD: CPT | Performed by: UROLOGY

## 2025-02-24 PROCEDURE — G2211 COMPLEX E/M VISIT ADD ON: HCPCS | Performed by: UROLOGY

## 2025-02-24 PROCEDURE — 3079F DIAST BP 80-89 MM HG: CPT | Performed by: UROLOGY

## 2025-02-24 PROCEDURE — 1036F TOBACCO NON-USER: CPT | Performed by: UROLOGY

## 2025-02-24 PROCEDURE — 3077F SYST BP >= 140 MM HG: CPT | Performed by: UROLOGY

## 2025-02-24 ASSESSMENT — ENCOUNTER SYMPTOMS
DIFFICULTY URINATING: 0
HEMATURIA: 0
FREQUENCY: 0
DYSURIA: 0

## 2025-02-24 NOTE — LETTER
"February 24, 2025     Wai Stevenson DO  [address] [facility]  [place] [address]    Patient: Leno Viveros   YOB: 1960   Date of Visit: 2/24/2025       Dear Dr. Wai Stevenson DO:    Thank you for referring Leno Viveros to me for evaluation. Below are my notes for this consultation.  If you have questions, please do not hesitate to call me. I look forward to following your patient along with you.       Sincerely,     David Woodard MD      CC: No Recipients  ______________________________________________________________________________________      64 year-old white male referred by Dr. Wai Stevenson for \"retention, BPH with obstruction\".  Last seen by Dr. Hector Martinez, urologist, on [place] 15, 2023.  BMI 42.7.  Presently on Flomax.  Patient is disabled, secondary to psychiatric issues.  Previously employed in the beverage distribution industry.  No family history of prostate or breast cancer.  Very minimal cigarette smoking history.     February 27, 2024, patient arrives alone.  He states that he has no urinary complaints.  He independently discontinued Flomax.  Urine cytology was negative for malignant cells.  Urine culture no growth.  Urinalysis was negative for blood.  PSA is normal at 2.60.  Renal ultrasound was \"unremarkable\".  He will return in 1 year.     September 18, 2024, telephone call, patient states that he is experiencing urinary incontinence again.  I recommended restarting Flomax and reevaluation with a cystoscopy with flow studies.  He agreed.     October 4, 2024, cystoscopy, flow studies and a discussion of treatment options.  Moderately obstructed prostatic urethra with mildly elevated median lobe and prostatic varices.  No evidence of stones or tumors.  PVR 18 cc.  He is being well-managed on Flomax without any further incontinence or nocturia.  Therefore, I will not add any medication to his regimen at this time.  He will keep his February 24, 2025 return " "appointment.    February 24, 2025, patient arrives alone.  He states he discontinued Flomax because he was urinating too frequently.  I informed him that his PVR has climbed from 18 cc to 96 cc and he previously had UTIs.  However he states that he would rather not take the medication.  PSA is normal at 1.36.  His #1 complaint is \"eating too much\".  BMI of 45.39.  He states he is got uncontrollable diet with large amounts of ice cream and multiple meals with large amounts of bread.  We discussed various options.  He will return in 1 year.     PLAN:     1.  Patient will return in February 2026 with urinalysis, urine culture and PSA.     Physical Exam  Vitals and nursing note reviewed.   Constitutional:       Appearance: Normal appearance.   HENT:      Head: Normocephalic and atraumatic.   Pulmonary:      Effort: Pulmonary effort is normal.   Abdominal:      Palpations: Abdomen is soft.      Tenderness: There is no abdominal tenderness.   Musculoskeletal:         General: Normal range of motion.      Cervical back: Normal range of motion and neck supple.   Neurological:      General: No focal deficit present.      Mental Status: He is alert and oriented to person, place, and time.   Psychiatric:         Mood and Affect: Mood normal.         Behavior: Behavior normal.        This note was created with voice-recognition software and was not corrected for typographical or grammatical errors.             "

## 2025-02-24 NOTE — PROGRESS NOTES
"  64 year-old white male referred by Dr. Wai Stevenson for \"retention, BPH with obstruction\".  Last seen by Dr. Hector Martinez, urologist, on Mon[place] 15, 2023.  BMI 42.7.  Presently on Flomax.  Patient is disabled, secondary to psychiatric issues.  Previously employed in the beverage distribution industry.  No family history of prostate or breast cancer.  Very minimal cigarette smoking history.     February 27, 2024, patient arrives alone.  He states that he has no urinary complaints.  He independently discontinued Flomax.  Urine cytology was negative for malignant cells.  Urine culture no growth.  Urinalysis was negative for blood.  PSA is normal at 2.60.  Renal ultrasound was \"unremarkable\".  He will return in 1 year.     September 18, 2024, telephone call, patient states that he is experiencing urinary incontinence again.  I recommended restarting Flomax and reevaluation with a cystoscopy with flow studies.  He agreed.     October 4, 2024, cystoscopy, flow studies and a discussion of treatment options.  Moderately obstructed prostatic urethra with mildly elevated median lobe and prostatic varices.  No evidence of stones or tumors.  PVR 18 cc.  He is being well-managed on Flomax without any further incontinence or nocturia.  Therefore, I will not add any medication to his regimen at this time.  He will keep his February 24, 2025 return appointment.    February 24, 2025, patient arrives alone.  He states he discontinued Flomax because he was urinating too frequently.  I informed him that his PVR has climbed from 18 cc to 96 cc and he previously had UTIs.  However he states that he would rather not take the medication.  PSA is normal at 1.36.  His #1 complaint is \"eating too much\".  BMI of 45.39.  He states he is got uncontrollable diet with large amounts of ice cream and multiple meals with large amounts of bread.  We discussed various options.  He will return in 1 year.     PLAN:     1.  Patient will return in " February 2026 with urinalysis, urine culture and PSA.     Physical Exam  Vitals and nursing note reviewed.   Constitutional:       Appearance: Normal appearance.   HENT:      Head: Normocephalic and atraumatic.   Pulmonary:      Effort: Pulmonary effort is normal.   Abdominal:      Palpations: Abdomen is soft.      Tenderness: There is no abdominal tenderness.   Musculoskeletal:         General: Normal range of motion.      Cervical back: Normal range of motion and neck supple.   Neurological:      General: No focal deficit present.      Mental Status: He is alert and oriented to person, place, and time.   Psychiatric:         Mood and Affect: Mood normal.         Behavior: Behavior normal.        This note was created with voice-recognition software and was not corrected for typographical or grammatical errors.

## 2025-02-24 NOTE — PROGRESS NOTES
Patient denies any pain today. Patient has not had any recent surgeries or hospital admits. Patient denies any concerns about falling or safety. Patient has no new urinary issues. Patient stopped talking Flomax for the past month. CV    Review of Systems   Genitourinary:  Negative for decreased urine volume, difficulty urinating, dysuria, enuresis, frequency, hematuria, penile pain, penile swelling, scrotal swelling, testicular pain and urgency.   All other systems reviewed and are negative.

## 2025-02-24 NOTE — PATIENT INSTRUCTIONS
"    It was very nice to see you again today.  We discussed your previous urinary tract issues including a urinary tract infection.  You did find that Flomax was of benefit, however, you have decided to independently discontinue that medication.  You stated that it was making you \"go too often\".  However it did decrease your postvoid residuals and you are emptying much better on the medication.  You do not wish to restart at this time.  Your PSA is normal at 1.30.  You did not obtain your ordered urine test.  I will see you again next year     This note was created with voice-recognition software and was not corrected for typographical or grammatical errors.                    "

## 2025-02-25 DIAGNOSIS — F41.1 GENERALIZED ANXIETY DISORDER: ICD-10-CM

## 2025-02-28 RX ORDER — DIAZEPAM 5 MG/1
5 TABLET ORAL EVERY 8 HOURS
Qty: 90 TABLET | Refills: 0 | Status: SHIPPED | OUTPATIENT
Start: 2025-02-28

## 2025-04-04 DIAGNOSIS — F41.1 GENERALIZED ANXIETY DISORDER: ICD-10-CM

## 2025-04-06 RX ORDER — DIAZEPAM 5 MG/1
5 TABLET ORAL EVERY 8 HOURS
Qty: 90 TABLET | Refills: 0 | Status: SHIPPED | OUTPATIENT
Start: 2025-04-06

## 2025-04-11 DIAGNOSIS — F31.62 BIPOLAR DISORDER, CURRENT EPISODE MIXED, MODERATE (MULTI): ICD-10-CM

## 2025-04-11 RX ORDER — CARBAMAZEPINE 100 MG/1
100 CAPSULE, EXTENDED RELEASE ORAL 2 TIMES DAILY
Qty: 60 CAPSULE | Refills: 2 | Status: SHIPPED | OUTPATIENT
Start: 2025-04-11 | End: 2025-07-10

## 2025-04-11 NOTE — TELEPHONE ENCOUNTER
REFILL REQUEST:  carBAMazepine (CarbatroL) 100 mg 12 hr capsule     [id number] - [place], [place] - [address]  [address], [place] [address]  Phone: [phone]  Fax: [phone]  ENIO #: --       PT ONLY HAS 3 DAYS OF MEDICATION LEFT

## 2025-04-11 NOTE — TELEPHONE ENCOUNTER
Recent Visits  Date Type Provider Dept   01/31/25 Office Visit Wai Stevenson, DO [facility]   10/31/24 Office Visit Wai Stevenson, DO [facility]   10/10/24 Appointment VIVI Walsh [facility]   09/18/24 Office Visit Wai Stevenson, DO [facility]   08/19/24 Office Visit Wai Stevenson, DO [facility]   06/03/24 Office Visit Wai Stevenson, DO [facility]   04/18/24 Office Visit Wai Stevenson, DO [facility]   Showing recent visits within past 365 days and meeting all other requirements  Future Appointments  Date Type Provider Dept   05/12/25 Appointment Wai Stevenson, DO [facility]   Showing future appointments within next 90 days and meeting all other requirements

## 2025-04-18 ENCOUNTER — TELEPHONE (OUTPATIENT)
Dept: PRIMARY CARE | Facility: CLINIC | Age: 65
End: 2025-04-18
Payer: MEDICARE

## 2025-05-12 ENCOUNTER — APPOINTMENT (OUTPATIENT)
Dept: PRIMARY CARE | Facility: CLINIC | Age: 65
End: 2025-05-12
Payer: MEDICARE

## 2025-05-12 VITALS
SYSTOLIC BLOOD PRESSURE: 118 MMHG | OXYGEN SATURATION: 98 % | WEIGHT: 315 LBS | HEART RATE: 67 BPM | BODY MASS INDEX: 41.75 KG/M2 | DIASTOLIC BLOOD PRESSURE: 80 MMHG | RESPIRATION RATE: 16 BRPM | HEIGHT: 73 IN | TEMPERATURE: 97.6 F

## 2025-05-12 DIAGNOSIS — M79.7 FIBROMYALGIA: ICD-10-CM

## 2025-05-12 DIAGNOSIS — M47.26 OSTEOARTHRITIS OF SPINE WITH RADICULOPATHY, LUMBAR REGION: ICD-10-CM

## 2025-05-12 DIAGNOSIS — E78.5 DYSLIPIDEMIA: ICD-10-CM

## 2025-05-12 DIAGNOSIS — Z00.00 MEDICARE ANNUAL WELLNESS VISIT, SUBSEQUENT: Primary | ICD-10-CM

## 2025-05-12 DIAGNOSIS — F41.1 GENERALIZED ANXIETY DISORDER: ICD-10-CM

## 2025-05-12 DIAGNOSIS — E66.01 MORBID OBESITY (MULTI): ICD-10-CM

## 2025-05-12 DIAGNOSIS — I10 PRIMARY HYPERTENSION: ICD-10-CM

## 2025-05-12 PROCEDURE — G0439 PPPS, SUBSEQ VISIT: HCPCS | Performed by: FAMILY MEDICINE

## 2025-05-12 PROCEDURE — 99497 ADVNCD CARE PLAN 30 MIN: CPT | Performed by: FAMILY MEDICINE

## 2025-05-12 RX ORDER — DIAZEPAM 5 MG/1
5 TABLET ORAL EVERY 8 HOURS
Qty: 90 TABLET | Refills: 0 | Status: SHIPPED | OUTPATIENT
Start: 2025-05-12

## 2025-05-12 ASSESSMENT — PATIENT HEALTH QUESTIONNAIRE - PHQ9
2. FEELING DOWN, DEPRESSED OR HOPELESS: NOT AT ALL
SUM OF ALL RESPONSES TO PHQ9 QUESTIONS 1 AND 2: 0
1. LITTLE INTEREST OR PLEASURE IN DOING THINGS: NOT AT ALL
2. FEELING DOWN, DEPRESSED OR HOPELESS: NOT AT ALL
SUM OF ALL RESPONSES TO PHQ9 QUESTIONS 1 AND 2: 0
1. LITTLE INTEREST OR PLEASURE IN DOING THINGS: NOT AT ALL

## 2025-05-12 ASSESSMENT — ACTIVITIES OF DAILY LIVING (ADL)
TAKING_MEDICATION: INDEPENDENT
GROCERY_SHOPPING: INDEPENDENT
DRESSING: INDEPENDENT
DOING_HOUSEWORK: INDEPENDENT
MANAGING_FINANCES: INDEPENDENT
BATHING: INDEPENDENT

## 2025-05-12 ASSESSMENT — ENCOUNTER SYMPTOMS
OCCASIONAL FEELINGS OF UNSTEADINESS: 0
DEPRESSION: 0
LOSS OF SENSATION IN FEET: 0

## 2025-05-12 NOTE — PROGRESS NOTES
Subjective   Reason for Visit: Leno Viveros is an 65 y.o. male here for a Medicare Wellness visit.       Past Medical, Surgical, and Family History reviewed and updated in chart.         HPI  History of Present Illness  Leno Viveros is a 65 year old male who presents for a wellness visit.    His recent PSA level was 1.30 two months ago, which he believes is satisfactory. However, his cholesterol levels were elevated with a total cholesterol of 224, HDL of 39, triglycerides of 197, and LDL of 151. He has been attempting to improve his diet by consuming more chicken and turkey while reducing fatty foods, though he acknowledges a continued high intake of cheese. He has a history of high triglycerides and is concerned about his bread and cheese consumption affecting his levels. He plans to have blood work done before his next visit.    He recalls previous issues with prostate pain and urination, which he attributes to high ice cream consumption. He no longer takes Flomax as he feels it is unnecessary.    He takes Carbatrol (carbamazepine) 100 mg twice a day and B12 supplements. He does not take Tirzepatide due to cost and availability issues. He also takes Valium as discussed.    He experiences significant joint pain, particularly in damp weather, which he attributes to arthritis. This pain is severe enough to affect his sleep, requiring him to roll from side to side due to shoulder pain. He takes Tylenol for the pain, limiting himself to 3000 mg per day.    He had his COVID and flu vaccines in September and is considering a shingles vaccine, having had shingles in the past. He is not interested in the pneumonia vaccine at this time.    He does not smoke, uses alcohol, and has reduced his caffeine intake.       Patient Care Team:  Wai Stevenson DO as PCP - General (Family Medicine)  Wai Stevenson DO as PCP - [facility]  Jr Espinosa MD as Cardiologist (Interventional  "Cardiology)       12 Systems have been reviewed as follows.  Constitutional: Fever, weight gain, weight loss, appetite change, night sweats, fatigue, chills.  Eyes : blurry, double vision, vision, loss, tearing, redness, pain, sensitivity to light, glaucoma.  Ears, nose, mouth, and throat: Hearing loss, ringing in the ears, ear pain, nasal congestion, nasal drainage, nosebleeds, mouth, throat, irritation tooth problem.  Cardiovascular :chest pain, pressure, heart racing, palpitations, sweating, leg swelling, high or low blood pressure  Pulmonary: Cough, yellow or green sputum, blood and sputum, shortness of breath, wheezing  Gastrointestinal: Nausea, vomiting, diarrhea, constipation, pain, blood in stool, or vomitus, heartburn, difficulty swallowing  Genitourinary: incontinence, abnormal bleeding, abnormal discharge, urinary frequency, urinary hesitancy, pain, impotence sexual problem, infection, urinary retention  Musculoskeletal: Pain, stiffness, joint, redness or warmth, arthritis, back pain, weakness, muscle wasting, sprain or fracture  Neuro: Weight weakness, dizziness, change in voice, change in taste change in vision, change in hearing, loss, or change of sensation, trouble walking, balance problems coordination problems, shaking, speech problem  Endocrine , cold or heat intolerance, blood sugar problem, weight gain or loss missed periods hot flashes, sweats, change in body hair, change in libido, increased thirst, increased urination  Heme/lymph: Swelling, bleeding, problem anemia, bruising, enlarged lymph nodes  Allergic/immunologic: H. plus nasal drip, watery itchy eyes, nasal drainage, immunosuppressed  The above were reviewed and noted negative except as noted in HPI and Problem List.    Objective   Vitals:  /80 (BP Location: Left arm, Patient Position: Sitting, BP Cuff Size: Large adult)   Pulse 67   Temp 36.4 °C (97.6 °F) (Temporal)   Resp 16   Ht 1.854 m (6' 1\")   Wt 149 kg (328 lb)   SpO2 " 98%   BMI 43.27 kg/m²         Physical Exam  VITALS: BP- 118/80  GENERAL: Alert, cooperative, well developed, no acute distress.  HEENT: Normocephalic, normal oropharynx, moist mucous membranes.  CHEST: Clear to auscultation bilaterally. No wheezes, rhonchi, or crackles.  CARDIOVASCULAR: Normal heart rate and rhythm. S1 and S2 normal without murmurs.  BREAST: Tenderness near the right breast.  ABDOMEN: Soft, non-tender, non-distended, without organomegaly. Normal bowel sounds.  EXTREMITIES: No cyanosis or edema.  NEUROLOGICAL: Cranial nerves grossly intact. Moves all extremities without gross motor or sensory deficit.     Constitutional: Well developed, well nourished, alert and in no acute distress   Eyes: Normal external exam. Pupils equally round and reactive to light with normal accommodation and extraocular movements intact.  Neck: Supple, no lymphadenopathy or masses.   Cardiovascular: Regular rate and rhythm, normal S1 and S2, no murmurs, gallops, or rubs. Radial pulses normal. No peripheral edema.  Pulmonary: No respiratory distress, lungs clear to auscultation bilaterally. No wheezes, rhonchi, rales.  Abdomen: soft,non tender, non distended, without masses or HSM  Skin: Warm, well perfused, normal skin turgor and color.   Neurologic: Cranial nerves II-XII grossly intact.   Psychiatric: Mood calm and affect normal  Musculoskeletal: Moving all extremities without restriction  The above were reviewed and noted negative except as noted in HPI and Problem List.    Problem List Items Addressed This Visit       Dyslipidemia      Orders:    Lipid Panel; Future    Comprehensive Metabolic Panel; Future           Relevant Orders    Lipid Panel    Comprehensive Metabolic Panel    Fibromyalgia                  Osteoarthritis of spine with radiculopathy, lumbar region                  Generalized anxiety disorder      Orders:    diazePAM (Valium) 5 mg tablet; Take 1 tablet (5 mg) by mouth every 8 hours.            Relevant Medications    diazePAM (Valium) 5 mg tablet    Hypertension                  BMI 40.0-44.9, adult (Multi)                   Other Visit Diagnoses         Medicare annual wellness visit, subsequent    -  Primary      Morbid obesity (Multi)                 Assessment & Plan  Medicare annual wellness visit, subsequent         Generalized anxiety disorder    Orders:    diazePAM (Valium) 5 mg tablet; Take 1 tablet (5 mg) by mouth every 8 hours.    Dyslipidemia    Orders:    Lipid Panel; Future    Comprehensive Metabolic Panel; Future    Primary hypertension         BMI 40.0-44.9, adult (Multi)         Morbid obesity (Multi)         Fibromyalgia         Osteoarthritis of spine with radiculopathy, lumbar region            Results  LABS  PSA: 1.30 (02/20/2025)  Total cholesterol: 224 (02/20/2025)  HDL: 39 (02/20/2025)  Triglycerides: 197 (02/20/2025)  LDL: 151 (02/20/2025)  Blood glucose: 104 (02/20/2025)  BUN: 18 (02/20/2025)  Creatinine: 1.0 (02/20/2025)  GFR: 85 (02/20/2025)  Sodium: 141 (02/20/2025)  Potassium: 4.0 (02/20/2025)  Protein: 6.0 (02/20/2025)  Calcium: 9.2 (02/20/2025)  AST: 13 (02/20/2025)  ALT: 14 (02/20/2025)    DIAGNOSTIC  Cardiac calcium score: 0 (05/13/2023)       Assessment & Plan  Chronic shoulder pain  Chronic shoulder pain causing sleep disturbances, requiring frequent repositioning. Currently managed with acetaminophen, with a preference to avoid excessive use. Advised to limit acetaminophen to 3000 mg per day.  - Continue acetaminophen as needed, not exceeding 3000 mg per day.    Hyperlipidemia  Chronic hyperlipidemia with elevated total cholesterol at 224 mg/dL, HDL at 39 mg/dL, triglycerides at 197 mg/dL, and LDL at 151 mg/dL. Prefers lifestyle modifications over medication for cholesterol management.  - Encourage dietary modifications to reduce cholesterol intake.  - Reassess lipid panel in future visit.    Shingles  Concern for recurrence of shingles. Has not received the shingles  vaccine and is considering it for future prevention.  - Provide information on shingles vaccination.    General Health Maintenance  Reviewed immunization status. Received COVID and influenza vaccines in September. Declined pneumococcal vaccine despite eligibility due to age.  - Consider pneumococcal vaccination in future.         Advance Directives Discussion  16 - 20 minutes were spent discussing Advanced Care Planning (including a Living Will, Medical Power Of , as well as specific end of life choices and/or directives). The details of that discussion were documented in Advanced Directives Discussion section of the medical record.

## 2025-05-13 PROBLEM — E78.2 MIXED HYPERLIPIDEMIA: Status: RESOLVED | Noted: 2023-02-05 | Resolved: 2025-05-13

## 2025-05-14 NOTE — PATIENT INSTRUCTIONS
VISIT SUMMARY:  You came in today for a wellness visit. We discussed your recent lab results, current medications, and some ongoing health concerns. You have been making dietary changes to manage your cholesterol levels and are experiencing chronic shoulder pain that affects your sleep. We also reviewed your immunization status and discussed the shingles vaccine.    YOUR PLAN:  -CHRONIC SHOULDER PAIN: Chronic shoulder pain is ongoing pain in your shoulder that can affect daily activities and sleep. You are currently managing it with acetaminophen, and you should continue taking it as needed, but do not exceed 3000 mg per day.    -HYPERLIPIDEMIA: Hyperlipidemia means you have high levels of cholesterol in your blood, which can increase your risk of heart disease. You are working on improving your diet to lower your cholesterol levels. We will reassess your lipid panel at your next visit.    -SHINGLES: Shingles is a viral infection that causes a painful rash and can recur. You are considering getting the shingles vaccine to prevent future outbreaks. We provided you with information about the vaccine.    -GENERAL HEALTH MAINTENANCE: You are up to date with your COVID and flu vaccines. Although you declined the pneumococcal vaccine today, you may want to consider it in the future.    INSTRUCTIONS:  Please continue with your current medications and dietary changes. Limit your acetaminophen intake to no more than 3000 mg per day. We will reassess your cholesterol levels at your next visit. Consider getting the shingles vaccine to prevent future outbreaks. If you have any new symptoms or concerns, please schedule an appointment.

## 2025-05-16 ENCOUNTER — TELEPHONE (OUTPATIENT)
Dept: PRIMARY CARE | Facility: CLINIC | Age: 65
End: 2025-05-16
Payer: MEDICARE

## 2025-05-16 NOTE — TELEPHONE ENCOUNTER
[id number] [place] - [address]  [address] [facility], [facility] [address]  Phone: [phone]   Please call pharmacy to clarify quantity. They need to know if this should be a 30 or 90 day supply.

## 2025-07-14 DIAGNOSIS — F41.1 GENERALIZED ANXIETY DISORDER: ICD-10-CM

## 2025-07-14 NOTE — TELEPHONE ENCOUNTER
Pt needs refill on  diazePAM (Valium) 5 mg tablet   [id number] - [place], [place] - [address]  [address] [facility] [place], [place] [address]

## 2025-07-14 NOTE — TELEPHONE ENCOUNTER
Recent Visits  Date Type Provider Dept   05/12/25 Office Visit Wai Stevenson,  [facility]   01/31/25 Office Visit Wai Stevenson, DO [facility]   Showing recent visits within past 180 days and meeting all other requirements  Future Appointments  Date Type Provider Dept   08/18/25 Appointment Wai Stevenson,  [facility]   Showing future appointments within next 90 days and meeting all other requirements

## 2025-07-16 RX ORDER — DIAZEPAM 5 MG/1
5 TABLET ORAL EVERY 8 HOURS
Qty: 90 TABLET | Refills: 0 | Status: SHIPPED | OUTPATIENT
Start: 2025-07-16

## 2025-07-17 ENCOUNTER — TELEPHONE (OUTPATIENT)
Dept: PRIMARY CARE | Facility: CLINIC | Age: 65
End: 2025-07-17
Payer: MEDICARE

## 2025-07-19 DIAGNOSIS — F31.62 BIPOLAR DISORDER, CURRENT EPISODE MIXED, MODERATE (MULTI): ICD-10-CM

## 2025-07-19 RX ORDER — CARBAMAZEPINE 100 MG/1
CAPSULE, EXTENDED RELEASE ORAL
Qty: 60 CAPSULE | Refills: 2 | Status: SHIPPED | OUTPATIENT
Start: 2025-07-19

## 2025-08-12 DIAGNOSIS — E78.5 DYSLIPIDEMIA: ICD-10-CM

## 2025-08-18 ENCOUNTER — APPOINTMENT (OUTPATIENT)
Dept: PRIMARY CARE | Facility: CLINIC | Age: 65
End: 2025-08-18
Payer: MEDICARE

## 2025-08-18 VITALS
HEART RATE: 73 BPM | DIASTOLIC BLOOD PRESSURE: 80 MMHG | RESPIRATION RATE: 16 BRPM | WEIGHT: 315 LBS | SYSTOLIC BLOOD PRESSURE: 136 MMHG | TEMPERATURE: 98 F | HEIGHT: 73 IN | OXYGEN SATURATION: 97 % | BODY MASS INDEX: 41.75 KG/M2

## 2025-08-18 DIAGNOSIS — M25.561 CHRONIC PAIN OF BOTH KNEES: ICD-10-CM

## 2025-08-18 DIAGNOSIS — Z79.899 MEDICATION MANAGEMENT: ICD-10-CM

## 2025-08-18 DIAGNOSIS — E03.9 ACQUIRED HYPOTHYROIDISM: ICD-10-CM

## 2025-08-18 DIAGNOSIS — Z87.898 HISTORY OF BENZODIAZEPINE USE: ICD-10-CM

## 2025-08-18 DIAGNOSIS — F34.1 PERSISTENT DEPRESSIVE DISORDER: ICD-10-CM

## 2025-08-18 DIAGNOSIS — E66.01 MORBID OBESITY (MULTI): ICD-10-CM

## 2025-08-18 DIAGNOSIS — D50.8 IRON DEFICIENCY ANEMIA SECONDARY TO INADEQUATE DIETARY IRON INTAKE: ICD-10-CM

## 2025-08-18 DIAGNOSIS — G89.29 CHRONIC PAIN OF BOTH KNEES: ICD-10-CM

## 2025-08-18 DIAGNOSIS — I10 PRIMARY HYPERTENSION: Primary | ICD-10-CM

## 2025-08-18 DIAGNOSIS — F41.1 GENERALIZED ANXIETY DISORDER: ICD-10-CM

## 2025-08-18 DIAGNOSIS — M25.562 CHRONIC PAIN OF BOTH KNEES: ICD-10-CM

## 2025-08-18 DIAGNOSIS — E78.5 DYSLIPIDEMIA: ICD-10-CM

## 2025-08-18 PROCEDURE — G2211 COMPLEX E/M VISIT ADD ON: HCPCS | Performed by: FAMILY MEDICINE

## 2025-08-18 PROCEDURE — 99213 OFFICE O/P EST LOW 20 MIN: CPT | Performed by: FAMILY MEDICINE

## 2025-08-18 RX ORDER — DIAZEPAM 5 MG/1
5 TABLET ORAL EVERY 8 HOURS
Qty: 90 TABLET | Refills: 0 | Status: SHIPPED | OUTPATIENT
Start: 2025-08-18

## 2025-08-18 RX ORDER — DICLOFENAC SODIUM 75 MG/1
75 TABLET, DELAYED RELEASE ORAL 2 TIMES DAILY PRN
Qty: 60 TABLET | Refills: 0 | Status: SHIPPED | OUTPATIENT
Start: 2025-08-18 | End: 2025-10-17

## 2025-08-18 ASSESSMENT — ENCOUNTER SYMPTOMS
OCCASIONAL FEELINGS OF UNSTEADINESS: 0
DEPRESSION: 0
LOSS OF SENSATION IN FEET: 0

## 2025-08-18 ASSESSMENT — PATIENT HEALTH QUESTIONNAIRE - PHQ9
1. LITTLE INTEREST OR PLEASURE IN DOING THINGS: NOT AT ALL
2. FEELING DOWN, DEPRESSED OR HOPELESS: NOT AT ALL
SUM OF ALL RESPONSES TO PHQ9 QUESTIONS 1 AND 2: 0

## 2025-08-21 LAB
1OH-MIDAZOLAM UR-MCNC: NEGATIVE NG/ML
7AMINOCLONAZEPAM UR-MCNC: NEGATIVE NG/ML
A-OH ALPRAZ UR-MCNC: NEGATIVE NG/ML
A-OH-TRIAZOLAM UR-MCNC: NEGATIVE NG/ML
AMPHETAMINES UR QL: NEGATIVE NG/ML
BARBITURATES UR QL: NEGATIVE NG/ML
BZE UR QL: NEGATIVE NG/ML
CODEINE UR-MCNC: NEGATIVE NG/ML
CREAT UR-MCNC: 105.4 MG/DL
DRUG SCREEN COMMENT UR-IMP: ABNORMAL
EDDP UR-MCNC: NEGATIVE NG/ML
FENTANYL UR-MCNC: ABNORMAL NG/ML
HYDROCODONE UR-MCNC: NEGATIVE NG/ML
HYDROMORPHONE UR-MCNC: NEGATIVE NG/ML
LORAZEPAM UR-MCNC: NEGATIVE NG/ML
METHADONE UR-MCNC: NEGATIVE NG/ML
MORPHINE UR-MCNC: NEGATIVE NG/ML
NORDIAZEPAM UR-MCNC: 390 NG/ML
NORFENTANYL UR-MCNC: ABNORMAL NG/ML
NORHYDROCODONE UR CFM-MCNC: NEGATIVE NG/ML
NOROXYCODONE UR CFM-MCNC: NEGATIVE NG/ML
NORTRAMADOL UR-MCNC: NEGATIVE NG/ML
OH-ETHYLFLURAZ UR-MCNC: NEGATIVE NG/ML
OXAZEPAM UR-MCNC: 1108 NG/ML
OXIDANTS UR QL: NEGATIVE MCG/ML
OXYCODONE UR CFM-MCNC: NEGATIVE NG/ML
OXYMORPHONE UR CFM-MCNC: NEGATIVE NG/ML
PCP UR QL: NEGATIVE NG/ML
PH UR: 5 [PH] (ref 4.5–9)
QUEST 6 ACETYLMORPHINE: ABNORMAL
QUEST NOTES AND COMMENTS: ABNORMAL
QUEST PATIENT HISTORICAL REPORT: ABNORMAL
QUEST ZOLPIDEM: ABNORMAL
TEMAZEPAM UR-MCNC: 736 NG/ML
THC UR QL: NEGATIVE NG/ML
TRAMADOL UR-MCNC: NEGATIVE NG/ML
ZOLPIDEM PHENYL-4-CARB UR CFM-MCNC: ABNORMAL NG/ML

## 2025-08-25 LAB
1OH-MIDAZOLAM UR-MCNC: NEGATIVE NG/ML
7AMINOCLONAZEPAM UR-MCNC: NEGATIVE NG/ML
A-OH ALPRAZ UR-MCNC: NEGATIVE NG/ML
A-OH-TRIAZOLAM UR-MCNC: NEGATIVE NG/ML
AMPHETAMINES UR QL: NEGATIVE NG/ML
BARBITURATES UR QL: NEGATIVE NG/ML
BZE UR QL: NEGATIVE NG/ML
CODEINE UR-MCNC: NEGATIVE NG/ML
CREAT UR-MCNC: 105.4 MG/DL
DRUG SCREEN COMMENT UR-IMP: ABNORMAL
EDDP UR-MCNC: NEGATIVE NG/ML
FENTANYL UR-MCNC: NEGATIVE NG/ML
HYDROCODONE UR-MCNC: NEGATIVE NG/ML
HYDROMORPHONE UR-MCNC: NEGATIVE NG/ML
LORAZEPAM UR-MCNC: NEGATIVE NG/ML
METHADONE UR-MCNC: NEGATIVE NG/ML
MORPHINE UR-MCNC: NEGATIVE NG/ML
NORDIAZEPAM UR-MCNC: 390 NG/ML
NORFENTANYL UR-MCNC: NEGATIVE NG/ML
NORHYDROCODONE UR CFM-MCNC: NEGATIVE NG/ML
NOROXYCODONE UR CFM-MCNC: NEGATIVE NG/ML
NORTRAMADOL UR-MCNC: NEGATIVE NG/ML
OH-ETHYLFLURAZ UR-MCNC: NEGATIVE NG/ML
OXAZEPAM UR-MCNC: 1108 NG/ML
OXIDANTS UR QL: NEGATIVE MCG/ML
OXYCODONE UR CFM-MCNC: NEGATIVE NG/ML
OXYMORPHONE UR CFM-MCNC: NEGATIVE NG/ML
PCP UR QL: NEGATIVE NG/ML
PH UR: 5 [PH] (ref 4.5–9)
QUEST 6 ACETYLMORPHINE: NEGATIVE NG/ML
QUEST NOTES AND COMMENTS: ABNORMAL
QUEST ZOLPIDEM: NEGATIVE NG/ML
TEMAZEPAM UR-MCNC: 736 NG/ML
THC UR QL: NEGATIVE NG/ML
TRAMADOL UR-MCNC: NEGATIVE NG/ML
ZOLPIDEM PHENYL-4-CARB UR CFM-MCNC: NEGATIVE NG/ML

## 2025-10-15 ENCOUNTER — APPOINTMENT (OUTPATIENT)
Dept: CARDIOLOGY | Facility: CLINIC | Age: 65
End: 2025-10-15
Payer: MEDICARE

## 2025-11-18 ENCOUNTER — APPOINTMENT (OUTPATIENT)
Dept: PRIMARY CARE | Facility: CLINIC | Age: 65
End: 2025-11-18
Payer: MEDICARE

## 2026-03-10 ENCOUNTER — APPOINTMENT (OUTPATIENT)
Dept: UROLOGY | Facility: CLINIC | Age: 66
End: 2026-03-10
Payer: MEDICARE